# Patient Record
Sex: MALE | Race: WHITE | NOT HISPANIC OR LATINO | Employment: FULL TIME | URBAN - METROPOLITAN AREA
[De-identification: names, ages, dates, MRNs, and addresses within clinical notes are randomized per-mention and may not be internally consistent; named-entity substitution may affect disease eponyms.]

---

## 2019-01-24 NOTE — PROGRESS NOTES
Subjective:      Patient ID: Mana Cabral is a 48 y o  male  Chief Complaint   Patient presents with    new patient     establish patient       Here as a new patient  Just got new insurance and had to change doctors  Has history of acid reflux and is on nexium  Had EGD and is going to do antireflux surgery  Needs a referral in his network  When he went for bloodwork 4 months ago A1C was high and cholesterol was high  He has lost 10 pounds since then  He is eating less sugar and processed foods  He needs a recheck on labs  He does not remember how high they were  He did a "full body scan" at [facility] and was told he did have some plaque in his heart and would benefit from a statin  Up to date with eye exam   He was recently in [facility] and got a cold the last two days, has had a productive cough for past 9 days, nasal discharge, congestion  The following portions of the patient's history were reviewed and updated as appropriate: allergies, current medications, past family history, past medical history, past social history, past surgical history and problem list     Review of Systems   Constitutional: Negative  Respiratory: Negative  Cardiovascular: Negative  Current Outpatient Prescriptions   Medication Sig Dispense Refill    esomeprazole (NexIUM) 10 MG packet Take 10 mg by mouth every morning before breakfast      azithromycin (ZITHROMAX) 250 mg tablet Take 2 tablets today then 1 tablet daily x 4 days 6 tablet 0    zolpidem (AMBIEN) 10 mg tablet Take 1 tablet (10 mg total) by mouth daily at bedtime as needed for sleep 30 tablet 0     No current facility-administered medications for this visit  Objective:    /100   Pulse 92   Temp (!) 96 5 °F (35 8 °C)   Resp 16   Ht 6' 3" (1 905 m)   Wt 132 kg (292 lb)   BMI 36 50 kg/m²        Physical Exam   Constitutional: He appears well-developed and well-nourished     Eyes: Conjunctivae are normal    Neck: Neck supple  No JVD present  No thyromegaly present  Cardiovascular: Normal rate, regular rhythm, normal heart sounds and intact distal pulses  Exam reveals no gallop and no friction rub  Pulses are no weak pulses  No murmur heard  Pulses:       Dorsalis pedis pulses are 2+ on the right side, and 2+ on the left side  Pulmonary/Chest: Effort normal and breath sounds normal  He has no wheezes  He has no rales  Abdominal: Soft  Bowel sounds are normal  He exhibits no distension  There is no tenderness  Musculoskeletal: He exhibits no edema  Feet:   Right Foot:   Skin Integrity: Negative for ulcer, skin breakdown, erythema, warmth, callus or dry skin  Left Foot:   Skin Integrity: Negative for ulcer, skin breakdown, erythema, warmth, callus or dry skin  Current Outpatient Prescriptions   Medication Sig Dispense Refill    esomeprazole (NexIUM) 10 MG packet Take 10 mg by mouth every morning before breakfast      azithromycin (ZITHROMAX) 250 mg tablet Take 2 tablets today then 1 tablet daily x 4 days 6 tablet 0    zolpidem (AMBIEN) 10 mg tablet Take 1 tablet (10 mg total) by mouth daily at bedtime as needed for sleep 30 tablet 0     No current facility-administered medications for this visit  /100   Pulse 92   Temp (!) 96 5 °F (35 8 °C)   Resp 16   Ht 6' 3" (1 905 m)   Wt 132 kg (292 lb)   BMI 36 50 kg/m²     Patient's shoes and socks removed  Right Foot/Ankle   Right Foot Inspection  Skin Exam: skin normal and skin intact no dry skin, no warmth, no callus, no erythema, no maceration, no abnormal color, no pre-ulcer, no ulcer and no callus                          Toe Exam: ROM and strength within normal limits  Sensory     Proprioception: intact   Monofilament testing: intact  Vascular  Capillary refills: < 3 seconds  The right DP pulse is 2+       Left Foot/Ankle  Left Foot Inspection  Skin Exam: skin normal and skin intactno dry skin, no warmth, no erythema, no maceration, normal color, no pre-ulcer, no ulcer and no callus                         Toe Exam: ROM and strength within normal limits                   Sensory     Proprioception: intact  Monofilament: intact  Vascular  Capillary refills: < 3 seconds  The left DP pulse is 2+  Assign Risk Category:  No deformity present; No loss of protective sensation; No weak pulses       Risk: 0    Assessment/Plan:    Gastroesophageal reflux disease without esophagitis  He was referred to Dr Tasha Barr for further evaluation and management  For now will continue PPI  Type 2 diabetes mellitus without complication, without long-term current use of insulin ([facility])  No results found for: HGBA1C    No results for input(s): POCGLU in the last 72 hours  Blood Sugar Average: Last 72 hrs:    Has been diet-controlled per patient, although he does not know his A1C  He was given another bloodwork slip but would like to wait another couple of weeks until he can lose some more weight  Advised good foot and eye care  Discussed diabetic education referral but he defers at this time  Mixed hyperlipidemia  Will recheck labs and t/c statin  Elevated BP without diagnosis of hypertension  States this has been normal and will recheck after weight loss in 3-4 months  Diagnoses and all orders for this visit:    Upper respiratory tract infection, unspecified type  Comments:  Rx given  Supportive care discussed  Follow up if not improving  Orders:  -     azithromycin (ZITHROMAX) 250 mg tablet; Take 2 tablets today then 1 tablet daily x 4 days    Type 2 diabetes mellitus without complication, without long-term current use of insulin ([facility])  -     CBC and differential; Future  -     Comprehensive metabolic panel; Future  -     Lipid panel; Future  -     HEMOGLOBIN A1C W/ EAG ESTIMATION;  Future  -     CBC and differential  -     Comprehensive metabolic panel  -     Lipid panel  -     HEMOGLOBIN A1C W/ EAG ESTIMATION    Mixed hyperlipidemia  -     CBC and differential; Future  -     Comprehensive metabolic panel; Future  -     Lipid panel; Future  -     CBC and differential  -     Comprehensive metabolic panel  -     Lipid panel    Insomnia, unspecified type  -     zolpidem (AMBIEN) 10 mg tablet; Take 1 tablet (10 mg total) by mouth daily at bedtime as needed for sleep    Gastroesophageal reflux disease without esophagitis  -     Ambulatory referral to Gastroenterology; Future    Elevated BP without diagnosis of hypertension    Other orders  -     esomeprazole (NexIUM) 10 MG packet; Take 10 mg by mouth every morning before breakfast          Return in about 4 months (around 5/28/2019)         Norma Christie MD

## 2019-01-28 ENCOUNTER — OFFICE VISIT (OUTPATIENT)
Dept: FAMILY MEDICINE CLINIC | Facility: CLINIC | Age: 54
End: 2019-01-28
Payer: COMMERCIAL

## 2019-01-28 VITALS
HEART RATE: 92 BPM | SYSTOLIC BLOOD PRESSURE: 140 MMHG | HEIGHT: 75 IN | TEMPERATURE: 96.5 F | DIASTOLIC BLOOD PRESSURE: 100 MMHG | BODY MASS INDEX: 36.31 KG/M2 | RESPIRATION RATE: 16 BRPM | WEIGHT: 292 LBS

## 2019-01-28 DIAGNOSIS — K21.9 GASTROESOPHAGEAL REFLUX DISEASE WITHOUT ESOPHAGITIS: ICD-10-CM

## 2019-01-28 DIAGNOSIS — E78.2 MIXED HYPERLIPIDEMIA: ICD-10-CM

## 2019-01-28 DIAGNOSIS — E11.9 TYPE 2 DIABETES MELLITUS WITHOUT COMPLICATION, WITHOUT LONG-TERM CURRENT USE OF INSULIN (HCC): ICD-10-CM

## 2019-01-28 DIAGNOSIS — R03.0 ELEVATED BP WITHOUT DIAGNOSIS OF HYPERTENSION: ICD-10-CM

## 2019-01-28 DIAGNOSIS — J06.9 UPPER RESPIRATORY TRACT INFECTION, UNSPECIFIED TYPE: Primary | ICD-10-CM

## 2019-01-28 DIAGNOSIS — G47.00 INSOMNIA, UNSPECIFIED TYPE: ICD-10-CM

## 2019-01-28 PROCEDURE — 99203 OFFICE O/P NEW LOW 30 MIN: CPT | Performed by: INTERNAL MEDICINE

## 2019-01-28 RX ORDER — ZOLPIDEM TARTRATE 10 MG/1
10 TABLET ORAL
Qty: 30 TABLET | Refills: 0
Start: 2019-01-28 | End: 2019-06-17 | Stop reason: SDUPTHER

## 2019-01-28 RX ORDER — ESOMEPRAZOLE MAGNESIUM 10 MG/1
30 GRANULE, FOR SUSPENSION, EXTENDED RELEASE ORAL
COMMUNITY
End: 2022-01-27

## 2019-01-28 RX ORDER — AZITHROMYCIN 250 MG/1
TABLET, FILM COATED ORAL
Qty: 6 TABLET | Refills: 0 | Status: SHIPPED | OUTPATIENT
Start: 2019-01-28 | End: 2019-02-01

## 2019-01-28 NOTE — ASSESSMENT & PLAN NOTE
No results found for: HGBA1C    No results for input(s): POCGLU in the last 72 hours  Blood Sugar Average: Last 72 hrs:    Has been diet-controlled per patient, although he does not know his A1C  He was given another bloodwork slip but would like to wait another couple of weeks until he can lose some more weight  Advised good foot and eye care  Discussed diabetic education referral but he defers at this time

## 2019-02-05 ENCOUNTER — TELEPHONE (OUTPATIENT)
Dept: FAMILY MEDICINE CLINIC | Facility: CLINIC | Age: 54
End: 2019-02-05

## 2019-02-05 DIAGNOSIS — N52.9 ERECTILE DYSFUNCTION, UNSPECIFIED ERECTILE DYSFUNCTION TYPE: Primary | ICD-10-CM

## 2019-02-07 NOTE — TELEPHONE ENCOUNTER
Called patient again, no answer  Order in chart if patient needs it or calls back with a fax number   No further option at this time

## 2019-02-26 ENCOUNTER — DOCUMENTATION (OUTPATIENT)
Dept: GASTROENTEROLOGY | Facility: CLINIC | Age: 54
End: 2019-02-26

## 2019-03-01 LAB
TESTOST FREE SERPL-MCNC: 8.6 PG/ML (ref 7.2–24)
TESTOST SERPL-MCNC: 202 NG/DL (ref 264–916)

## 2019-03-02 LAB
ALBUMIN SERPL-MCNC: 4.3 G/DL (ref 3.5–5.5)
ALBUMIN/GLOB SERPL: 1.6 {RATIO} (ref 1.2–2.2)
ALP SERPL-CCNC: 86 IU/L (ref 39–117)
ALT SERPL-CCNC: 35 IU/L (ref 0–44)
AST SERPL-CCNC: 18 IU/L (ref 0–40)
BASOPHILS # BLD AUTO: 0 X10E3/UL (ref 0–0.2)
BASOPHILS NFR BLD AUTO: 0 %
BILIRUB SERPL-MCNC: 0.2 MG/DL (ref 0–1.2)
BUN SERPL-MCNC: 12 MG/DL (ref 6–24)
BUN/CREAT SERPL: 12 (ref 9–20)
CALCIUM SERPL-MCNC: 9.1 MG/DL (ref 8.7–10.2)
CHLORIDE SERPL-SCNC: 94 MMOL/L (ref 96–106)
CHOLEST SERPL-MCNC: 318 MG/DL (ref 100–199)
CHOLEST/HDLC SERPL: 15.1 RATIO (ref 0–5)
CO2 SERPL-SCNC: 23 MMOL/L (ref 20–29)
CREAT SERPL-MCNC: 0.99 MG/DL (ref 0.76–1.27)
EOSINOPHIL # BLD AUTO: 0.1 X10E3/UL (ref 0–0.4)
EOSINOPHIL NFR BLD AUTO: 2 %
ERYTHROCYTE [DISTWIDTH] IN BLOOD BY AUTOMATED COUNT: 13.2 % (ref 12.3–15.4)
EST. AVERAGE GLUCOSE BLD GHB EST-MCNC: 258 MG/DL
GLOBULIN SER-MCNC: 2.7 G/DL (ref 1.5–4.5)
GLUCOSE SERPL-MCNC: 361 MG/DL (ref 65–99)
HBA1C MFR BLD: 10.6 % (ref 4.8–5.6)
HCT VFR BLD AUTO: 44.4 % (ref 37.5–51)
HDLC SERPL-MCNC: 21 MG/DL
HGB BLD-MCNC: 15 G/DL (ref 13–17.7)
IMM GRANULOCYTES # BLD: 0 X10E3/UL (ref 0–0.1)
IMM GRANULOCYTES NFR BLD: 0 %
LDLC SERPL CALC-MCNC: ABNORMAL MG/DL (ref 0–99)
LDLC SERPL DIRECT ASSAY-MCNC: 71 MG/DL (ref 0–99)
LYMPHOCYTES # BLD AUTO: 1.2 X10E3/UL (ref 0.7–3.1)
LYMPHOCYTES NFR BLD AUTO: 23 %
MCH RBC QN AUTO: 29.5 PG (ref 26.6–33)
MCHC RBC AUTO-ENTMCNC: 33.8 G/DL (ref 31.5–35.7)
MCV RBC AUTO: 87 FL (ref 79–97)
MICRODELETION SYND BLD/T FISH: NORMAL
MONOCYTES # BLD AUTO: 0.5 X10E3/UL (ref 0.1–0.9)
MONOCYTES NFR BLD AUTO: 10 %
NEUTROPHILS # BLD AUTO: 3.3 X10E3/UL (ref 1.4–7)
NEUTROPHILS NFR BLD AUTO: 65 %
PLATELET # BLD AUTO: 171 X10E3/UL (ref 150–379)
POTASSIUM SERPL-SCNC: 4.1 MMOL/L (ref 3.5–5.2)
PROT SERPL-MCNC: 7 G/DL (ref 6–8.5)
RBC # BLD AUTO: 5.09 X10E6/UL (ref 4.14–5.8)
SL AMB EGFR AFRICAN AMERICAN: 102 ML/MIN/1.73
SL AMB EGFR NON AFRICAN AMERICAN: 88 ML/MIN/1.73
SL AMB VLDL CHOLESTEROL CALC: ABNORMAL MG/DL (ref 5–40)
SODIUM SERPL-SCNC: 135 MMOL/L (ref 134–144)
TRIGL SERPL-MCNC: 1412 MG/DL (ref 0–149)
WBC # BLD AUTO: 5.1 X10E3/UL (ref 3.4–10.8)

## 2019-03-07 ENCOUNTER — TELEPHONE (OUTPATIENT)
Dept: FAMILY MEDICINE CLINIC | Facility: CLINIC | Age: 54
End: 2019-03-07

## 2019-03-07 NOTE — TELEPHONE ENCOUNTER
Keanu Okeefe made an apt w/dr Josefina Rivero to go over blood work results but he would like a nurse to call back to tell him results (abdormal)   Letters were mailed but he has not received them yet        Please call Teodoro El at [phone]    Thank you

## 2019-03-07 NOTE — TELEPHONE ENCOUNTER
Pt advised of Glucose random, A1c, cholesterol total, triglycerides, HDL, T chol/HDL Raio, Testosterone Total and Testosterone Free results  Advised that all of these results will be discussed further at his appointment on the 13th with Dr Gisell Muniz  Advised in the meantime to make lifestyle changes in his current eating habits and with exercise  No further action required    Brenda Bryant MA

## 2019-03-11 NOTE — PROGRESS NOTES
Subjective:      Patient ID: Daylin Shepherd is a 48 y o  male  Chief Complaint   Patient presents with    Follow-up    Results       Here to discuss labwork and DM  He has been going to the gym and doing a 5/2 fasting diet  His pinkie toes get numb sometimes and his vision is getting worse  Had recent eye exam and was told retinal exam was normal   He has been doing a lot of research and states he already knows what he needs to do as far as diet, does not want diabetic education  Is exercising regularly, started recently  The following portions of the patient's history were reviewed and updated as appropriate: allergies, current medications, past family history, past medical history, past social history, past surgical history and problem list     Review of Systems   Constitutional: Negative  Respiratory: Negative  Cardiovascular: Negative  Neurological: Positive for numbness  Current Outpatient Medications   Medication Sig Dispense Refill    esomeprazole (NexIUM) 10 MG packet Take 10 mg by mouth every morning before breakfast      zolpidem (AMBIEN) 10 mg tablet Take 1 tablet (10 mg total) by mouth daily at bedtime as needed for sleep 30 tablet 0    atorvastatin (LIPITOR) 40 mg tablet Take 1 tablet (40 mg total) by mouth daily 90 tablet 3    sitaGLIPtin-metFORMIN (JANUMET)  MG per tablet Take 1 tablet by mouth 2 (two) times a day with meals 180 tablet 1     No current facility-administered medications for this visit  Objective:    /80   Pulse 84   Temp 97 6 °F (36 4 °C)   Resp 16   Ht 6' 3" (1 905 m)   Wt 132 kg (290 lb)   BMI 36 25 kg/m²        Physical Exam   Constitutional: He appears well-developed and well-nourished  Eyes: Conjunctivae are normal    Neck: Neck supple  No JVD present  No thyromegaly present  Cardiovascular: Normal rate, regular rhythm, normal heart sounds and intact distal pulses  Exam reveals no gallop and no friction rub   Pulses are no weak pulses  No murmur heard  Pulses:       Dorsalis pedis pulses are 2+ on the right side, and 2+ on the left side  Pulmonary/Chest: Effort normal and breath sounds normal  He has no wheezes  He has no rales  Abdominal: Soft  Bowel sounds are normal  He exhibits no distension  There is no tenderness  Musculoskeletal: He exhibits no edema  Feet:   Right Foot:   Skin Integrity: Negative for ulcer, skin breakdown, erythema, warmth, callus or dry skin  Left Foot:   Skin Integrity: Negative for ulcer, skin breakdown, erythema, warmth, callus or dry skin  Patient's shoes and socks removed  Right Foot/Ankle   Right Foot Inspection  Skin Exam: skin normal and skin intact no dry skin, no warmth, no callus, no erythema, no maceration, no abnormal color, no pre-ulcer, no ulcer and no callus                          Toe Exam: ROM and strength within normal limits  Sensory       Monofilament testing: intact  Vascular  Capillary refills: < 3 seconds  The right DP pulse is 2+  Left Foot/Ankle  Left Foot Inspection  Skin Exam: skin normal and skin intactno dry skin, no warmth, no erythema, no maceration, normal color, no pre-ulcer, no ulcer and no callus                         Toe Exam: ROM and strength within normal limits                   Sensory       Monofilament: intact  Vascular  Capillary refills: < 3 seconds  The left DP pulse is 2+  Assign Risk Category:  No deformity present; No loss of protective sensation; No weak pulses       Risk: 0      Assessment/Plan:    Type 2 diabetes mellitus without complication, without long-term current use of insulin ([facility])  Lab Results   Component Value Date    HGBA1C 10 6 (H) 02/28/2019       No results for input(s): POCGLU in the last 72 hours  Blood Sugar Average: Last 72 hrs: We discussed different therapies  Will start janumet twice a day, to start once daily for the first week  Discussed possible side effects with patient    Advised on good foot and eye care  Discussed need for diet/exercise and continued weight loss  Will follow up in 3 months after bloodwork  Mixed hyperlipidemia  Will start moderate dose atorvastatin due to DM and past coronary calcium score (by patient) of 81  Possible side effects were discussed  Elevated BP without diagnosis of hypertension  BP normal today  Discussed he may need ACE-I for renoprotection  Diagnoses and all orders for this visit:    Type 2 diabetes mellitus without complication, without long-term current use of insulin ([facility])  -     sitaGLIPtin-metFORMIN (JANUMET)  MG per tablet; Take 1 tablet by mouth 2 (two) times a day with meals  -     Comprehensive metabolic panel; Future  -     Lipid panel; Future  -     HEMOGLOBIN A1C W/ EAG ESTIMATION; Future  -     Comprehensive metabolic panel  -     Lipid panel  -     HEMOGLOBIN A1C W/ EAG ESTIMATION    Mixed hyperlipidemia  -     atorvastatin (LIPITOR) 40 mg tablet; Take 1 tablet (40 mg total) by mouth daily  -     Comprehensive metabolic panel; Future  -     Lipid panel; Future  -     Comprehensive metabolic panel  -     Lipid panel    Elevated BP without diagnosis of hypertension          Return in about 3 months (around 6/13/2019)         Soto Mosley MD

## 2019-03-13 ENCOUNTER — OFFICE VISIT (OUTPATIENT)
Dept: FAMILY MEDICINE CLINIC | Facility: CLINIC | Age: 54
End: 2019-03-13
Payer: COMMERCIAL

## 2019-03-13 VITALS
DIASTOLIC BLOOD PRESSURE: 80 MMHG | HEIGHT: 75 IN | TEMPERATURE: 97.6 F | BODY MASS INDEX: 36.06 KG/M2 | RESPIRATION RATE: 16 BRPM | HEART RATE: 84 BPM | WEIGHT: 290 LBS | SYSTOLIC BLOOD PRESSURE: 134 MMHG

## 2019-03-13 DIAGNOSIS — E11.9 TYPE 2 DIABETES MELLITUS WITHOUT COMPLICATION, WITHOUT LONG-TERM CURRENT USE OF INSULIN (HCC): Primary | ICD-10-CM

## 2019-03-13 DIAGNOSIS — R03.0 ELEVATED BP WITHOUT DIAGNOSIS OF HYPERTENSION: ICD-10-CM

## 2019-03-13 DIAGNOSIS — E78.2 MIXED HYPERLIPIDEMIA: ICD-10-CM

## 2019-03-13 PROCEDURE — 99214 OFFICE O/P EST MOD 30 MIN: CPT | Performed by: INTERNAL MEDICINE

## 2019-03-13 RX ORDER — ATORVASTATIN CALCIUM 40 MG/1
40 TABLET, FILM COATED ORAL DAILY
Qty: 90 TABLET | Refills: 3 | Status: SHIPPED | OUTPATIENT
Start: 2019-03-13 | End: 2019-06-04 | Stop reason: SDUPTHER

## 2019-03-13 NOTE — ASSESSMENT & PLAN NOTE
Lab Results   Component Value Date    HGBA1C 10 6 (H) 02/28/2019       No results for input(s): POCGLU in the last 72 hours  Blood Sugar Average: Last 72 hrs: We discussed different therapies  Will start janumet twice a day, to start once daily for the first week  Discussed possible side effects with patient  Advised on good foot and eye care  Discussed need for diet/exercise and continued weight loss  Will follow up in 3 months after bloodwork

## 2019-03-13 NOTE — ASSESSMENT & PLAN NOTE
Will start moderate dose atorvastatin due to DM and past coronary calcium score (by patient) of 81  Possible side effects were discussed

## 2019-03-18 ENCOUNTER — TELEPHONE (OUTPATIENT)
Dept: FAMILY MEDICINE CLINIC | Facility: CLINIC | Age: 54
End: 2019-03-18

## 2019-03-18 DIAGNOSIS — E11.9 TYPE 2 DIABETES MELLITUS WITHOUT COMPLICATION, WITHOUT LONG-TERM CURRENT USE OF INSULIN (HCC): Primary | ICD-10-CM

## 2019-03-18 DIAGNOSIS — E11.65 TYPE 2 DIABETES MELLITUS WITH HYPERGLYCEMIA, WITHOUT LONG-TERM CURRENT USE OF INSULIN (HCC): ICD-10-CM

## 2019-03-18 NOTE — TELEPHONE ENCOUNTER
Spoke with pt, states he has been having blurred vision and a headache the past couple of days which he feels may be from the blurred vision  Pt states the only new thing that he has been doing is the janumet-metformin  Pt wants to know if this is a common side effect from this medication  Pt denies any chest pain, SOB, nausea, diarrhea, vomiting  Pt would like a call back tonight to confirm and than send this message to Dr Yuliet Moses  Pt states he may also want to see an Endocrinologist regarding his diabetes because his numbers are pretty bad per pt   [place], [place]

## 2019-03-18 NOTE — TELEPHONE ENCOUNTER
Please call patient and let him know that the blurred vision is not a side effect of the medication, but can be a side effect of high sugar  I agree an endocrinologist would be helpful  Please recommend Dr Ifeanyi Waters - in [place]  I can put the order in if he would like    Bernadette Cason, DO

## 2019-03-18 NOTE — TELEPHONE ENCOUNTER
Pt would like a nurse to call him back tonight  He believes he is having some side effects from his Metformin and Janumet, blurry vision

## 2019-03-21 ENCOUNTER — APPOINTMENT (OUTPATIENT)
Dept: RADIOLOGY | Facility: CLINIC | Age: 54
End: 2019-03-21
Payer: COMMERCIAL

## 2019-03-21 ENCOUNTER — OFFICE VISIT (OUTPATIENT)
Dept: OBGYN CLINIC | Facility: CLINIC | Age: 54
End: 2019-03-21
Payer: COMMERCIAL

## 2019-03-21 VITALS
HEIGHT: 75 IN | DIASTOLIC BLOOD PRESSURE: 82 MMHG | WEIGHT: 285 LBS | HEART RATE: 85 BPM | BODY MASS INDEX: 35.43 KG/M2 | SYSTOLIC BLOOD PRESSURE: 136 MMHG

## 2019-03-21 DIAGNOSIS — M25.562 LEFT KNEE PAIN, UNSPECIFIED CHRONICITY: ICD-10-CM

## 2019-03-21 DIAGNOSIS — M17.12 PRIMARY LOCALIZED OSTEOARTHRITIS OF LEFT KNEE: Primary | ICD-10-CM

## 2019-03-21 PROCEDURE — 73562 X-RAY EXAM OF KNEE 3: CPT

## 2019-03-21 PROCEDURE — 99203 OFFICE O/P NEW LOW 30 MIN: CPT | Performed by: ORTHOPAEDIC SURGERY

## 2019-03-21 PROCEDURE — 20610 DRAIN/INJ JOINT/BURSA W/O US: CPT | Performed by: ORTHOPAEDIC SURGERY

## 2019-03-21 RX ORDER — LIDOCAINE HYDROCHLORIDE 10 MG/ML
4 INJECTION, SOLUTION INFILTRATION; PERINEURAL
Status: COMPLETED | OUTPATIENT
Start: 2019-03-21 | End: 2019-03-21

## 2019-03-21 RX ORDER — DEXAMETHASONE SODIUM PHOSPHATE 100 MG/10ML
40 INJECTION INTRAMUSCULAR; INTRAVENOUS
Status: COMPLETED | OUTPATIENT
Start: 2019-03-21 | End: 2019-03-21

## 2019-03-21 RX ADMIN — LIDOCAINE HYDROCHLORIDE 4 ML: 10 INJECTION, SOLUTION INFILTRATION; PERINEURAL at 15:59

## 2019-03-21 RX ADMIN — DEXAMETHASONE SODIUM PHOSPHATE 40 MG: 100 INJECTION INTRAMUSCULAR; INTRAVENOUS at 15:59

## 2019-03-21 NOTE — PROGRESS NOTES
Assessment/Plan:  1  Primary localized osteoarthritis of left knee  XR knee 3 vw left non injury     Leonardo Arroyo has left-sided knee pain consistent with mild osteoarthritis  We did elect to provide a left knee cortisone injection at his request today  I discussed with him the concern and risks that come along with a cortisone injection with diabetes  He understand these risks and verbally consented to a cortisone injection at this time  I counseled him extensively on warning signs of increased blood sugar and signs and symptoms that he should go to the hospital   He will try to check his blood sugar at home as well  Hopefully the cortisone injection calm down some of his discomfort and can improve his symptoms going forward  We also discussed possibility of physical therapy, bracing and viscosupplementation in the future if the pain persists  Subjective: Brandon Fowler is a 48 y o  male who presents for evaluation for left-sided knee pain  He denies any specific injury or trauma  He has had a history of recurrent discomfort over the anterior medial aspect of the left knee  He states that seems to bother him more when he is in a hyperflexed position  He states he sits on a low chair and bend his knee in this often cause increased discomfort anteriorly  He has had similar pain in the past which improved with rest   He denies any radiating pain or numbness  He denies any mechanical symptoms of locking or catching  He denies any effusion  He does have recently diagnosed diabetes  He is on oral medications  He has been current rolling his symptoms and blood sugar work with diet as well  He seems to be stable on a low sugar diet  Review of Systems   Constitutional: Negative for chills, fever and unexpected weight change  HENT: Negative for hearing loss, nosebleeds and sore throat  Eyes: Negative for pain, redness and visual disturbance     Respiratory: Negative for cough, shortness of breath and wheezing  Cardiovascular: Negative for chest pain, palpitations and leg swelling  Gastrointestinal: Negative for abdominal pain, nausea and vomiting  Endocrine: Negative for polyphagia and polyuria  Genitourinary: Negative for dysuria and hematuria  Musculoskeletal:        See HPI   Skin: Negative for rash and wound  Neurological: Negative for dizziness, numbness and headaches  Psychiatric/Behavioral: Negative for decreased concentration and suicidal ideas  The patient is not nervous/anxious  Past Medical History:   Diagnosis Date    Diabetes mellitus ([facility] [address] )        Past Surgical History:   Procedure Laterality Date    HEMICOLECTOMY      SKIN CANCER EXCISION         Family History   Problem Relation Age of Onset    Diabetes Mother     Lung cancer Mother     Hyperlipidemia Father     Hypertension Father     Hyperlipidemia Brother     Hypertension Brother        Social History     Occupational History    Not on file   Tobacco Use    Smoking status: Never Smoker    Smokeless tobacco: Never Used   Substance and Sexual Activity    Alcohol use: Not on file    Drug use: Not on file    Sexual activity: Not on file         Current Outpatient Medications:     atorvastatin (LIPITOR) 40 mg tablet, Take 1 tablet (40 mg total) by mouth daily, Disp: 90 tablet, Rfl: 3    esomeprazole (NexIUM) 10 MG packet, Take 10 mg by mouth every morning before breakfast, Disp: , Rfl:     sitaGLIPtin-metFORMIN (JANUMET)  MG per tablet, Take 1 tablet by mouth 2 (two) times a day with meals, Disp: 180 tablet, Rfl: 1    zolpidem (AMBIEN) 10 mg tablet, Take 1 tablet (10 mg total) by mouth daily at bedtime as needed for sleep, Disp: 30 tablet, Rfl: 0    No Known Allergies    Objective:  Vitals:    03/21/19 1427   BP: 136/82   Pulse: 85       Left Knee Exam     Tenderness   The patient is experiencing tenderness in the medial joint line and medial retinaculum      Range of Motion   Extension: normal Flexion: normal     Tests   Mg:  Medial - negative Lateral - negative  Varus: negative Valgus: negative  Lachman:  Anterior - negative        Other   Erythema: absent  Sensation: normal  Pulse: present  Swelling: none  Effusion: no effusion present          Observations   Left Knee   Negative for effusion  Physical Exam   Constitutional: He is oriented to person, place, and time  He appears well-developed and well-nourished  HENT:   Head: Normocephalic and atraumatic  Eyes: Pupils are equal, round, and reactive to light  Conjunctivae are normal    Neck: Normal range of motion  Neck supple  Cardiovascular: Normal rate and intact distal pulses  Pulmonary/Chest: Effort normal  No respiratory distress  Musculoskeletal:        Left knee: He exhibits no effusion  As noted in HPI   Neurological: He is alert and oriented to person, place, and time  Skin: Skin is warm and dry  Psychiatric: He has a normal mood and affect  His behavior is normal    Vitals reviewed  I have personally reviewed pertinent films in PACS and my interpretation is as follows: Three-view x-rays of the left knee demonstrate mild medial compartment narrowing consistent with mild osteoarthritis  No evidence of fracture  Lateral patellar tilt visualized on Merchant view      Large joint arthrocentesis: L knee  Date/Time: 3/21/2019 3:59 PM  Consent given by: patient  Site marked: site marked  Timeout: Immediately prior to procedure a time out was called to verify the correct patient, procedure, equipment, support staff and site/side marked as required   Supporting Documentation  Indications: pain   Procedure Details  Location: knee - L knee  Preparation: Patient was prepped and draped in the usual sterile fashion  Needle size: 22 G  Ultrasound guidance: no  Approach: anterolateral  Medications administered: 40 mg dexamethasone 100 mg/10 mL; 4 mL lidocaine 1 %    Patient tolerance: patient tolerated the procedure well with no immediate complications  Dressing:  Sterile dressing applied

## 2019-03-27 ENCOUNTER — OFFICE VISIT (OUTPATIENT)
Dept: DIABETES SERVICES | Facility: CLINIC | Age: 54
End: 2019-03-27
Payer: COMMERCIAL

## 2019-03-27 ENCOUNTER — CONSULT (OUTPATIENT)
Dept: ENDOCRINOLOGY | Facility: CLINIC | Age: 54
End: 2019-03-27
Payer: COMMERCIAL

## 2019-03-27 VITALS
DIASTOLIC BLOOD PRESSURE: 78 MMHG | HEIGHT: 75 IN | HEART RATE: 74 BPM | SYSTOLIC BLOOD PRESSURE: 120 MMHG | WEIGHT: 290 LBS | BODY MASS INDEX: 36.06 KG/M2

## 2019-03-27 DIAGNOSIS — E11.9 TYPE 2 DIABETES MELLITUS WITHOUT COMPLICATION, WITHOUT LONG-TERM CURRENT USE OF INSULIN (HCC): ICD-10-CM

## 2019-03-27 DIAGNOSIS — E78.2 MIXED HYPERLIPIDEMIA: ICD-10-CM

## 2019-03-27 DIAGNOSIS — E11.65 TYPE 2 DIABETES MELLITUS WITH HYPERGLYCEMIA, WITHOUT LONG-TERM CURRENT USE OF INSULIN (HCC): Primary | ICD-10-CM

## 2019-03-27 DIAGNOSIS — R20.0 NUMBNESS: ICD-10-CM

## 2019-03-27 PROCEDURE — 98960 EDU&TRN PT SELF-MGMT NQHP 1: CPT | Performed by: DIETITIAN, REGISTERED

## 2019-03-27 PROCEDURE — 99245 OFF/OP CONSLTJ NEW/EST HI 55: CPT | Performed by: INTERNAL MEDICINE

## 2019-03-27 NOTE — PROGRESS NOTES
Type 2 Diabetes Class Assessment    HPI: Met with Judge Rust for DSME Initial visit  Bernadette Hernandez has Type 2 Diabetes, Hypertriglyceridemia, Obesity  Patients recent A1c 10 6, Triglycerides 1,412  Patient is taking his medications as prescribed  He is only testing his BG 1 x day  He self reports readings of 108-118 fasting  He was educated on doing paired testing to find patterns of hyperglycemia to facilitate changes to diet/lifestyle/meds  He is currently on the [employer] with 2 shakes and 1 small meal per day  He stated he has been exercising 5 days per week (aerobic and strength) and has lost 9 lbs  He was educated on identifying serving size and total carbs on a food label  Diabetes Assessment  Visit Type: Initial visit  Present at Session: patient   Special Learning Needs: No  Barriers to Learning: no barriers    How do you feel about making lifestyle changes at this time? Preparation  How would you rate your current knowledge of diabetes? fair  How confident are you that will be able to take better control of your diabetes?: excellent    How long have you had diabetes? 2 years  Have you had diabetes education in the past?: No  Do you have any family members with diabetes?: Yes  Do you monitor your blood sugar? yes  Type of blood sugar monitor: Accu chek  How old is your meter?: New  How often do you test your blood sugars?:1 x day  Do you keep a written record of your blood sugars? No   Blood sugar log with patient today and reviewed by educator?: No   Blood Sugar ranges:    Fastin   Before meals:    2 hours after meals:   Any financial concerns pertaining to your diabetes supplies, medication or care?: No  Have you ever experienced hypoglycemia?:  Yes  Have you ever been hospitalized or gone to the ER due to your blood sugars?: No  How do you treat low blood sugars?: Juice  How do you treat high blood sugars?  N/A  Do you wear a Diabetes I D ?: no  Where do you dispose of your sharps (needles,lancetes)?: Regular garbage    Ht Readings from Last 1 Encounters:   03/27/19 6' 3" (1 905 m)     Wt Readings from Last 3 Encounters:   03/27/19 132 kg (290 lb)   03/21/19 129 kg (285 lb)   03/13/19 132 kg (290 lb)        There is no height or weight on file to calculate BMI  Lab Results   Component Value Date    HGBA1C 10 6 (H) 02/28/2019       No results found for: [facility]  Lab Results   Component Value Date    HDL 21 (L) 02/28/2019     No results found for: [facility]  Lab Results   Component Value Date    TRIG 1,412 (HH) 02/28/2019     Lab Results   Component Value Date    CHOLHDL 15 1 (H) 02/28/2019     No results found for: [facility]    Weight Change: Yes -9 lbs    Diet Assessment    Do you follow any special diet presently?: Yes - Isogenic  Who cooks at home?:  spouse  Who does the grocery shopping?: spouse   How frequently do you eat out?: 2-3 x week    Activity Assessment    Exercise: Weights and cardio 5 x week    Lifestyle/Social Assessment    Racial/ethnic group:                                       Primary Language: English  Marital Status:   Education Level: Some College (No Degree)  Work status: Full Time  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 Diabetes?   When was the last time you had an ophthalmology exam?: Yearly  When was the last time you had dental exam?: Every 6 months  Do you check your feet for cracks, sores, debris?: No  When was the last time you had podiatry or foot exam?: No  Last flu shot?: 2018  Pneumonia shot?: No      The patient's history was reviewed and updated as appropriate: allergies, current medications  Intervention    Diabetes Overview :   Emmy Johns was instructed on basic concepts of diabetes, including identifying role of diabetes self management, basic pathophysiology and types of diabetes, A1c and blood sugar targets  Emmy Johns has good understanding of material covered  Taking Medications: Instructed patient on action, side effects, efficacy, prescribed dosage and appropriate timing and frequency of administration of his diabetes medication  Emmy Johns has good understanding of material covered  Monitoring Blood Sugars  Instructions for Meter Teaching- Patient instructed in the following:  Site selection and skin preparation, Loading strips and lancet device, meter activation, obtaining blood sample, test strip and lancet disposal and recording log book entries  Patient has good understanding of material covered and was able to test their own blood sugar in office today  Testing frequency: Encouraged pair testing  Test sugars before a meal and 2hr after the same meal, rotating between breakfast, lunch, and dinner  Test sugars twice a day (3 days a week, 7 days a week)  Goal Blood Sugars:   Premeal , even better <110  2hr after a meal <180, even better <140  A1C <7%, even better <6 5%  Hypoglycemia: Instructed patient on definition/risk of hypoglycemia, treatment, causes/symptoms, when to notify provider of lows, prevention of hypoglycemia and exercise precautions  Comments: Ellie Hunt understanding of hypoglycemia concepts      Physical Activity: Discussed benefits of physical activity to optimize blood glucose control, encouraged activity at patient is physically able   Always consult a physician prior to starting an exercise program   Comments: Ellie Hunt understanding of exercise concepts        Diabetes Education Record  Emmy Johns received the following handouts: Know Your Blood Sugar Numbers, BG Log, Hyper/hypoglycemia, Nutrition Facts      Patient response to instruction    Comprehensionvery good  Motivationexcellent  Expected Complianceexcellent    Thank you for referring your patient to [facility], it was a pleasure working with them today  Please feel free to call with any questions or concerns      Angelique Armstrong, [address] [place] [address] [place]  [id number] [phone]

## 2019-03-27 NOTE — PATIENT INSTRUCTIONS
Continue current medications     You are being referred for diabetes education and nutrition therapy     Follow up in 3 months with repeat labs

## 2019-03-27 NOTE — PROGRESS NOTES
Esther Hanson 48 y o  male MRN: [id number]    Encounter: [id number]      Assessment/Plan     Assessment: This is a 48y o -year-old male with type 2 diabetes mellitus, hyperlipidemia    Plan:    1  Type 2 diabetes mellitus  Last A1c 10 6 %  New diagnosis of diabetes for the patient, recently started on oral hypoglycemic agents  Good glycemic control based on recall of blood sugars, however checked only fasting and pre-dinner    - continue current regimen for now  - diabetes education, medical nutrition therapy -  referral placed  - advised patient to take staggered check blood sugars throughout the day, send for review in 2-3 weeks  - repeat A1c, BMP, lipid panel in 3 months  Also check urine microalbumin    In follow-up would increase metformin to maximum dose as tolerated  - Recommended a consistent carbohydrate diet   - weight control and exercise as discussed ( ideal is 30 min, at least 5 times a week)   - home glucose monitoring and goals emphasized, requested patient bring in glucose monitor or log sheets to next visit   - discussed signs and symptoms of hypoglycemia and how to correct them appropriately  - counseled about the long term complications of uncontrolled diabetes, including, Nephropathy, Neuropathy, CVD, Retinopathy and importance  of adherence to diet, treatment plan and life style modifications   - importance of following up with Opthalmology and podiatry   - continue statin therapy  - glycohemoglobin and other lab monitoring discussed, A1c goal value reviewed  - long term diabetic complications discussed    2  Hyperlipidemia, triglyceridemia  Glycemic control as above  Continue statin  Repeat lipid panel in 3 months    3  Numbness  - check vitamin B12, vitamin-D, TSH      CC: Diabetes    History of Present Illness     HPI:  Esther Hanson is a 48 y o  male presents for a new visit regarding diabetes management        DM history:   Diagnosed in 2017,  Patient states that his A1c was around 7 6%, was not started on any medications but made lifestyle changes in lost approximately 9 lb  After that his repeat A1c was within normal limits  Most recent labs showed an A1c of 10 6 %; Now on medications only for the last 3 weeks  At the same time was also found to have hyperlipidemia    complications  CAD/ CVA/CKD  Family history of DM: Mother type 1   Last Eye exam: Jan 2019 - no retinopathy per patient    Current regimen:   Janumet 50/500 1 tablet orally twice a day    Statin:  Lipitor 40  ACE-I/ARB: none     Since the patient has recent diagnosis he said he has made major lifestyle changes  He is trying to eat better- high protein ; Avoids carbohydrates ; no refines sugars   Trying to exercise ; Almost everyday 1 5 hrs , cardio and aerobics  Hoping to lose weight  Denies any side effects with the medication  Appetite is good  Denies recent weight gain/ loss  Occasional numbness in the pinky does  Has had blurriness of vision  Prescription glasses from 3 months ago are not working as well  No chest pain/ SOB  No diarrhea/ constipation  No urinary complaints  Recall   Before breakfast: 108, 118, 108  Before lunch:  --   Before dinner:  118,   Bedtime:  --     All other systems were reviewed and are negative        Review of Systems      Historical Information   Past Medical History:   Diagnosis Date    Diabetes mellitus ([facility] [address] )      Past Surgical History:   Procedure Laterality Date    HEMICOLECTOMY      SKIN CANCER EXCISION       Social History   Social History     Substance and Sexual Activity   Alcohol Use Not Currently     Social History     Substance and Sexual Activity   Drug Use Not on file     Social History     Tobacco Use   Smoking Status Never Smoker   Smokeless Tobacco Never Used     Family History:   Family History   Problem Relation Age of Onset    Diabetes Mother     Lung cancer Mother     Hyperlipidemia Father     Hypertension Father     Hyperlipidemia Brother     Hypertension Brother        Meds/Allergies   Current Outpatient Medications   Medication Sig Dispense Refill    atorvastatin (LIPITOR) 40 mg tablet Take 1 tablet (40 mg total) by mouth daily 90 tablet 3    esomeprazole (NexIUM) 10 MG packet Take 10 mg by mouth every morning before breakfast      sitaGLIPtin-metFORMIN (JANUMET)  MG per tablet Take 1 tablet by mouth 2 (two) times a day with meals 180 tablet 1    zolpidem (AMBIEN) 10 mg tablet Take 1 tablet (10 mg total) by mouth daily at bedtime as needed for sleep 30 tablet 0     No current facility-administered medications for this visit  No Known Allergies    Objective   Vitals: Blood pressure 120/78, pulse 74, height 6' 3" (1 905 m), weight 132 kg (290 lb)  Physical Exam   Constitutional: He is oriented to person, place, and time  He appears well-developed and well-nourished  No distress  HENT:   Head: Normocephalic and atraumatic  Eyes: Pupils are equal, round, and reactive to light  Conjunctivae are normal    Neck: Normal range of motion  Neck supple  No thyromegaly present  Cardiovascular: Normal rate, regular rhythm and normal heart sounds  No murmur heard  Pulmonary/Chest: Effort normal and breath sounds normal  No respiratory distress  He has no wheezes  Abdominal: Soft  He exhibits no distension  There is no tenderness  There is no guarding  Obese  Well-healed vertical surgical scar  Hernia right side of the abdomen   Musculoskeletal: He exhibits no edema  Lymphadenopathy:     He has no cervical adenopathy  Neurological: He is alert and oriented to person, place, and time  Skin: Skin is warm and dry  No rash noted  He is not diaphoretic  No erythema  Psychiatric: He has a normal mood and affect  His behavior is normal  Thought content normal    Vitals reviewed  The history was obtained from the review of the chart, patient      Lab Results:   Lab Results   Component Value Date/Time    Hemoglobin A1C 10 6 (H) 02/28/2019 07:34 AM    White Blood Cell Count 5 1 02/28/2019 07:34 AM    Hemoglobin 15 0 02/28/2019 07:34 AM    HCT 44 4 02/28/2019 07:34 AM    MCV 87 02/28/2019 07:34 AM    Platelet Count 515 68/34/2929 07:34 AM    BUN 12 02/28/2019 07:34 AM    Potassium 4 1 02/28/2019 07:34 AM    Chloride 94 (L) 02/28/2019 07:34 AM    CO2 23 02/28/2019 07:34 AM    AST 18 02/28/2019 07:34 AM    ALT 35 02/28/2019 07:34 AM    Albumin 4 3 02/28/2019 07:34 AM    Globulin, Total 2 7 02/28/2019 07:34 AM    HDL 21 (L) 02/28/2019 07:34 AM    Triglycerides 1,412 ([place]) 02/28/2019 07:34 AM           Imaging Studies: I have personally reviewed pertinent reports  Portions of the record may have been created with voice recognition software  Occasional wrong word or "sound a like" substitutions may have occurred due to the inherent limitations of voice recognition software  Read the chart carefully and recognize, using context, where substitutions have occurred

## 2019-03-27 NOTE — PATIENT INSTRUCTIONS
1  Start testing blood sugar before and two hours after different meals as shown  If blood sugar is above target, write what food you have eaten in the comment section of the blood sugar log   2  Check food labels for serving size and total carbohydrates per serving  If blood sugar is high 2 hours after eating, try eating less of the carb food or switch to a different food to see if that works better

## 2019-04-01 ENCOUNTER — OFFICE VISIT (OUTPATIENT)
Dept: FAMILY MEDICINE CLINIC | Facility: CLINIC | Age: 54
End: 2019-04-01
Payer: COMMERCIAL

## 2019-04-01 VITALS
TEMPERATURE: 97.4 F | RESPIRATION RATE: 16 BRPM | WEIGHT: 290.2 LBS | SYSTOLIC BLOOD PRESSURE: 132 MMHG | DIASTOLIC BLOOD PRESSURE: 84 MMHG | HEIGHT: 75 IN | BODY MASS INDEX: 36.08 KG/M2 | HEART RATE: 74 BPM

## 2019-04-01 DIAGNOSIS — J35.8 CRYPTIC TONSIL: ICD-10-CM

## 2019-04-01 DIAGNOSIS — K21.9 GASTROESOPHAGEAL REFLUX DISEASE WITHOUT ESOPHAGITIS: ICD-10-CM

## 2019-04-01 DIAGNOSIS — J35.8 TONSIL STONE: Primary | ICD-10-CM

## 2019-04-01 DIAGNOSIS — E11.65 TYPE 2 DIABETES MELLITUS WITH HYPERGLYCEMIA, WITHOUT LONG-TERM CURRENT USE OF INSULIN (HCC): ICD-10-CM

## 2019-04-01 PROBLEM — R03.0 ELEVATED BP WITHOUT DIAGNOSIS OF HYPERTENSION: Status: RESOLVED | Noted: 2019-01-28 | Resolved: 2019-04-01

## 2019-04-01 PROCEDURE — 3008F BODY MASS INDEX DOCD: CPT | Performed by: FAMILY MEDICINE

## 2019-04-01 PROCEDURE — 99214 OFFICE O/P EST MOD 30 MIN: CPT | Performed by: FAMILY MEDICINE

## 2019-04-03 ENCOUNTER — TELEPHONE (OUTPATIENT)
Dept: OBGYN CLINIC | Facility: HOSPITAL | Age: 54
End: 2019-04-03

## 2019-04-03 DIAGNOSIS — M17.12 PRIMARY LOCALIZED OSTEOARTHRITIS OF LEFT KNEE: Primary | ICD-10-CM

## 2019-04-04 ENCOUNTER — TELEPHONE (OUTPATIENT)
Dept: ENDOCRINOLOGY | Facility: CLINIC | Age: 54
End: 2019-04-04

## 2019-04-05 ENCOUNTER — TELEPHONE (OUTPATIENT)
Dept: OBGYN CLINIC | Facility: CLINIC | Age: 54
End: 2019-04-05

## 2019-04-05 DIAGNOSIS — M17.12 PRIMARY LOCALIZED OSTEOARTHRITIS OF LEFT KNEE: Primary | ICD-10-CM

## 2019-04-05 RX ORDER — NAPROXEN 500 MG/1
500 TABLET ORAL 2 TIMES DAILY WITH MEALS
Qty: 60 TABLET | Refills: 0 | Status: ON HOLD | OUTPATIENT
Start: 2019-04-05 | End: 2019-10-17

## 2019-04-11 ENCOUNTER — OFFICE VISIT (OUTPATIENT)
Dept: DIABETES SERVICES | Facility: CLINIC | Age: 54
End: 2019-04-11
Payer: COMMERCIAL

## 2019-04-11 VITALS — WEIGHT: 287.1 LBS | BODY MASS INDEX: 35.89 KG/M2

## 2019-04-11 DIAGNOSIS — E11.9 TYPE 2 DIABETES MELLITUS WITHOUT COMPLICATION, WITHOUT LONG-TERM CURRENT USE OF INSULIN (HCC): Primary | ICD-10-CM

## 2019-04-11 PROCEDURE — 97802 MEDICAL NUTRITION INDIV IN: CPT | Performed by: DIETITIAN, REGISTERED

## 2019-04-24 ENCOUNTER — TELEPHONE (OUTPATIENT)
Dept: ENDOCRINOLOGY | Facility: CLINIC | Age: 54
End: 2019-04-24

## 2019-04-25 ENCOUNTER — OFFICE VISIT (OUTPATIENT)
Dept: OBGYN CLINIC | Facility: CLINIC | Age: 54
End: 2019-04-25
Payer: COMMERCIAL

## 2019-04-25 VITALS
SYSTOLIC BLOOD PRESSURE: 132 MMHG | WEIGHT: 277 LBS | HEART RATE: 82 BPM | DIASTOLIC BLOOD PRESSURE: 89 MMHG | BODY MASS INDEX: 34.44 KG/M2 | HEIGHT: 75 IN

## 2019-04-25 DIAGNOSIS — M17.12 PRIMARY LOCALIZED OSTEOARTHRITIS OF LEFT KNEE: Primary | ICD-10-CM

## 2019-04-25 PROCEDURE — 99213 OFFICE O/P EST LOW 20 MIN: CPT | Performed by: ORTHOPAEDIC SURGERY

## 2019-04-25 PROCEDURE — 20610 DRAIN/INJ JOINT/BURSA W/O US: CPT | Performed by: ORTHOPAEDIC SURGERY

## 2019-04-25 RX ORDER — HYALURONATE SODIUM 10 MG/ML
20 SYRINGE (ML) INTRAARTICULAR
Status: COMPLETED | OUTPATIENT
Start: 2019-04-25 | End: 2019-04-25

## 2019-04-25 RX ADMIN — Medication 20 MG: at 13:47

## 2019-04-29 ENCOUNTER — OFFICE VISIT (OUTPATIENT)
Dept: ENDOCRINOLOGY | Facility: CLINIC | Age: 54
End: 2019-04-29
Payer: COMMERCIAL

## 2019-04-29 VITALS
HEART RATE: 85 BPM | SYSTOLIC BLOOD PRESSURE: 120 MMHG | HEIGHT: 75 IN | WEIGHT: 277 LBS | DIASTOLIC BLOOD PRESSURE: 60 MMHG | BODY MASS INDEX: 34.44 KG/M2

## 2019-04-29 DIAGNOSIS — R79.89 LOW TESTOSTERONE: Primary | ICD-10-CM

## 2019-04-29 DIAGNOSIS — E11.65 TYPE 2 DIABETES MELLITUS WITH HYPERGLYCEMIA, WITHOUT LONG-TERM CURRENT USE OF INSULIN (HCC): ICD-10-CM

## 2019-04-29 DIAGNOSIS — E78.2 MIXED HYPERLIPIDEMIA: ICD-10-CM

## 2019-04-29 PROCEDURE — 99215 OFFICE O/P EST HI 40 MIN: CPT | Performed by: INTERNAL MEDICINE

## 2019-05-02 ENCOUNTER — OFFICE VISIT (OUTPATIENT)
Dept: OBGYN CLINIC | Facility: CLINIC | Age: 54
End: 2019-05-02
Payer: COMMERCIAL

## 2019-05-02 VITALS
HEART RATE: 76 BPM | DIASTOLIC BLOOD PRESSURE: 75 MMHG | SYSTOLIC BLOOD PRESSURE: 128 MMHG | WEIGHT: 279.4 LBS | HEIGHT: 75 IN | BODY MASS INDEX: 34.74 KG/M2

## 2019-05-02 DIAGNOSIS — M17.12 PRIMARY LOCALIZED OSTEOARTHRITIS OF LEFT KNEE: Primary | ICD-10-CM

## 2019-05-02 PROCEDURE — 20610 DRAIN/INJ JOINT/BURSA W/O US: CPT | Performed by: ORTHOPAEDIC SURGERY

## 2019-05-02 RX ORDER — HYALURONATE SODIUM 10 MG/ML
20 SYRINGE (ML) INTRAARTICULAR
Status: COMPLETED | OUTPATIENT
Start: 2019-05-02 | End: 2019-05-02

## 2019-05-02 RX ADMIN — Medication 20 MG: at 08:56

## 2019-05-09 ENCOUNTER — OFFICE VISIT (OUTPATIENT)
Dept: OBGYN CLINIC | Facility: CLINIC | Age: 54
End: 2019-05-09
Payer: COMMERCIAL

## 2019-05-09 VITALS
WEIGHT: 274 LBS | DIASTOLIC BLOOD PRESSURE: 72 MMHG | HEIGHT: 75 IN | BODY MASS INDEX: 34.07 KG/M2 | HEART RATE: 76 BPM | SYSTOLIC BLOOD PRESSURE: 121 MMHG

## 2019-05-09 DIAGNOSIS — M17.12 PRIMARY LOCALIZED OSTEOARTHRITIS OF LEFT KNEE: Primary | ICD-10-CM

## 2019-05-09 PROCEDURE — 20610 DRAIN/INJ JOINT/BURSA W/O US: CPT | Performed by: ORTHOPAEDIC SURGERY

## 2019-05-09 RX ORDER — HYALURONATE SODIUM 10 MG/ML
20 SYRINGE (ML) INTRAARTICULAR
Status: COMPLETED | OUTPATIENT
Start: 2019-05-09 | End: 2019-05-09

## 2019-05-09 RX ADMIN — Medication 20 MG: at 16:56

## 2019-05-17 ENCOUNTER — OFFICE VISIT (OUTPATIENT)
Dept: OBGYN CLINIC | Facility: CLINIC | Age: 54
End: 2019-05-17
Payer: COMMERCIAL

## 2019-05-17 VITALS
HEIGHT: 75 IN | HEART RATE: 78 BPM | WEIGHT: 279.8 LBS | DIASTOLIC BLOOD PRESSURE: 80 MMHG | BODY MASS INDEX: 34.79 KG/M2 | SYSTOLIC BLOOD PRESSURE: 152 MMHG

## 2019-05-17 DIAGNOSIS — M75.51 SUBACROMIAL BURSITIS OF RIGHT SHOULDER JOINT: Primary | ICD-10-CM

## 2019-05-17 DIAGNOSIS — M17.12 PRIMARY OSTEOARTHRITIS OF LEFT KNEE: ICD-10-CM

## 2019-05-17 DIAGNOSIS — E11.65 TYPE 2 DIABETES MELLITUS WITH HYPERGLYCEMIA, WITHOUT LONG-TERM CURRENT USE OF INSULIN (HCC): ICD-10-CM

## 2019-05-17 DIAGNOSIS — M25.511 RIGHT SHOULDER PAIN, UNSPECIFIED CHRONICITY: ICD-10-CM

## 2019-05-17 PROCEDURE — 99214 OFFICE O/P EST MOD 30 MIN: CPT | Performed by: ORTHOPAEDIC SURGERY

## 2019-05-17 PROCEDURE — 20610 DRAIN/INJ JOINT/BURSA W/O US: CPT | Performed by: ORTHOPAEDIC SURGERY

## 2019-05-17 RX ORDER — DEXAMETHASONE SODIUM PHOSPHATE 100 MG/10ML
40 INJECTION INTRAMUSCULAR; INTRAVENOUS
Status: COMPLETED | OUTPATIENT
Start: 2019-05-17 | End: 2019-05-17

## 2019-05-17 RX ORDER — LIDOCAINE HYDROCHLORIDE 10 MG/ML
4 INJECTION, SOLUTION INFILTRATION; PERINEURAL
Status: COMPLETED | OUTPATIENT
Start: 2019-05-17 | End: 2019-05-17

## 2019-05-17 RX ADMIN — LIDOCAINE HYDROCHLORIDE 4 ML: 10 INJECTION, SOLUTION INFILTRATION; PERINEURAL at 14:45

## 2019-05-17 RX ADMIN — DEXAMETHASONE SODIUM PHOSPHATE 40 MG: 100 INJECTION INTRAMUSCULAR; INTRAVENOUS at 14:45

## 2019-06-04 DIAGNOSIS — E78.2 MIXED HYPERLIPIDEMIA: ICD-10-CM

## 2019-06-04 RX ORDER — ATORVASTATIN CALCIUM 40 MG/1
TABLET, FILM COATED ORAL
Qty: 90 TABLET | Refills: 3 | Status: SHIPPED | OUTPATIENT
Start: 2019-06-04 | End: 2020-07-02

## 2019-06-13 ENCOUNTER — TELEPHONE (OUTPATIENT)
Dept: FAMILY MEDICINE CLINIC | Facility: CLINIC | Age: 54
End: 2019-06-13

## 2019-06-15 LAB
ALBUMIN SERPL-MCNC: 4.3 G/DL (ref 3.5–5.5)
ALBUMIN/GLOB SERPL: 1.6 {RATIO} (ref 1.2–2.2)
ALP SERPL-CCNC: 54 IU/L (ref 39–117)
ALT SERPL-CCNC: 18 IU/L (ref 0–44)
AST SERPL-CCNC: 16 IU/L (ref 0–40)
BILIRUB SERPL-MCNC: 0.3 MG/DL (ref 0–1.2)
BUN SERPL-MCNC: 16 MG/DL (ref 6–24)
BUN/CREAT SERPL: 14 (ref 9–20)
CALCIUM SERPL-MCNC: 9.5 MG/DL (ref 8.7–10.2)
CHLORIDE SERPL-SCNC: 101 MMOL/L (ref 96–106)
CHOLEST SERPL-MCNC: 111 MG/DL (ref 100–199)
CHOLEST/HDLC SERPL: 3.4 RATIO (ref 0–5)
CO2 SERPL-SCNC: 25 MMOL/L (ref 20–29)
CREAT SERPL-MCNC: 1.15 MG/DL (ref 0.76–1.27)
EST. AVERAGE GLUCOSE BLD GHB EST-MCNC: 128 MG/DL
GLOBULIN SER-MCNC: 2.7 G/DL (ref 1.5–4.5)
GLUCOSE SERPL-MCNC: 97 MG/DL (ref 65–99)
HBA1C MFR BLD: 6.1 % (ref 4.8–5.6)
HDLC SERPL-MCNC: 33 MG/DL
LDLC SERPL CALC-MCNC: 64 MG/DL (ref 0–99)
MICRODELETION SYND BLD/T FISH: NORMAL
POTASSIUM SERPL-SCNC: 4.9 MMOL/L (ref 3.5–5.2)
PROT SERPL-MCNC: 7 G/DL (ref 6–8.5)
SL AMB EGFR AFRICAN AMERICAN: 85 ML/MIN/1.73
SL AMB EGFR NON AFRICAN AMERICAN: 73 ML/MIN/1.73
SL AMB VLDL CHOLESTEROL CALC: 14 MG/DL (ref 5–40)
SODIUM SERPL-SCNC: 140 MMOL/L (ref 134–144)
TRIGL SERPL-MCNC: 71 MG/DL (ref 0–149)

## 2019-06-17 ENCOUNTER — OFFICE VISIT (OUTPATIENT)
Dept: FAMILY MEDICINE CLINIC | Facility: CLINIC | Age: 54
End: 2019-06-17
Payer: COMMERCIAL

## 2019-06-17 VITALS
DIASTOLIC BLOOD PRESSURE: 90 MMHG | WEIGHT: 271 LBS | HEART RATE: 88 BPM | SYSTOLIC BLOOD PRESSURE: 120 MMHG | HEIGHT: 75 IN | TEMPERATURE: 97.5 F | RESPIRATION RATE: 16 BRPM | BODY MASS INDEX: 33.69 KG/M2

## 2019-06-17 DIAGNOSIS — E78.2 MIXED HYPERLIPIDEMIA: ICD-10-CM

## 2019-06-17 DIAGNOSIS — G47.00 INSOMNIA, UNSPECIFIED TYPE: ICD-10-CM

## 2019-06-17 DIAGNOSIS — Z11.59 ENCOUNTER FOR HEPATITIS C SCREENING TEST FOR LOW RISK PATIENT: ICD-10-CM

## 2019-06-17 DIAGNOSIS — E11.65 TYPE 2 DIABETES MELLITUS WITH HYPERGLYCEMIA, WITHOUT LONG-TERM CURRENT USE OF INSULIN (HCC): Primary | ICD-10-CM

## 2019-06-17 PROCEDURE — 99214 OFFICE O/P EST MOD 30 MIN: CPT | Performed by: INTERNAL MEDICINE

## 2019-06-17 RX ORDER — ZOLPIDEM TARTRATE 10 MG/1
10 TABLET ORAL
Qty: 30 TABLET | Refills: 0 | Status: SHIPPED | OUTPATIENT
Start: 2019-06-17 | End: 2019-08-13 | Stop reason: SDUPTHER

## 2019-06-24 ENCOUNTER — TELEPHONE (OUTPATIENT)
Dept: OBGYN CLINIC | Facility: HOSPITAL | Age: 54
End: 2019-06-24

## 2019-06-24 DIAGNOSIS — M25.562 ACUTE PAIN OF LEFT KNEE: Primary | ICD-10-CM

## 2019-06-27 ENCOUNTER — TELEPHONE (OUTPATIENT)
Dept: OBGYN CLINIC | Facility: CLINIC | Age: 54
End: 2019-06-27

## 2019-06-28 ENCOUNTER — TELEPHONE (OUTPATIENT)
Dept: OBGYN CLINIC | Facility: CLINIC | Age: 54
End: 2019-06-28

## 2019-07-01 ENCOUNTER — HOSPITAL ENCOUNTER (OUTPATIENT)
Dept: RADIOLOGY | Facility: HOSPITAL | Age: 54
Discharge: HOME | End: 2019-07-01
Attending: ORTHOPAEDIC SURGERY
Payer: COMMERCIAL

## 2019-07-01 DIAGNOSIS — M25.562 ACUTE PAIN OF LEFT KNEE: ICD-10-CM

## 2019-07-01 PROCEDURE — 73721 MRI JNT OF LWR EXTRE W/O DYE: CPT

## 2019-07-02 ENCOUNTER — TELEPHONE (OUTPATIENT)
Dept: OBGYN CLINIC | Facility: HOSPITAL | Age: 54
End: 2019-07-02

## 2019-07-02 NOTE — TELEPHONE ENCOUNTER
Patient is calling to inform us that the MRI of his left knee was completed on 07/01/19  The patient would prefer to get the results over the phone  The patient would prefer to avoid an OV copay  Please advise       [phone]

## 2019-07-03 ENCOUNTER — TELEPHONE (OUTPATIENT)
Dept: OBGYN CLINIC | Facility: CLINIC | Age: 54
End: 2019-07-03

## 2019-07-03 NOTE — TELEPHONE ENCOUNTER
----- Message from Kathleen Araiza DO sent at 7/2/2019  5:17 PM EDT -----  Jeanie Graham is MRI did show a medial meniscus tear in his knee  It also showed mild osteoarthritis in the knee  Since he did not improve with conservative treatment of injections I would like for him to see Dr Karen Barteltt to discuss surgical options

## 2019-07-19 ENCOUNTER — OFFICE VISIT (OUTPATIENT)
Dept: OBGYN CLINIC | Facility: CLINIC | Age: 54
End: 2019-07-19
Payer: COMMERCIAL

## 2019-07-19 VITALS
HEIGHT: 76 IN | SYSTOLIC BLOOD PRESSURE: 145 MMHG | DIASTOLIC BLOOD PRESSURE: 90 MMHG | BODY MASS INDEX: 31.59 KG/M2 | HEART RATE: 97 BPM | WEIGHT: 259.4 LBS

## 2019-07-19 DIAGNOSIS — S83.232A COMPLEX TEAR OF MEDIAL MENISCUS OF LEFT KNEE AS CURRENT INJURY, INITIAL ENCOUNTER: Primary | ICD-10-CM

## 2019-07-19 PROCEDURE — 99214 OFFICE O/P EST MOD 30 MIN: CPT | Performed by: ORTHOPAEDIC SURGERY

## 2019-07-19 NOTE — PROGRESS NOTES
Assessment/Plan:  1  Complex tear of medial meniscus of left knee as current injury, initial encounter         Scribe Attestation    I,:   Luis Lopez MA am acting as a scribe while in the presence of the attending physician :        I,:   Kasia Shi MD personally performed the services described in this documentation    as scribed in my presence :            MRI was reviewed patient today in office  Based off his imaging I do believe patient would benefit from surgical intervention  Patient agrees with the plan of care and would like to proceed with surgery  Patient will undergo a left knee arthroscopy with medial meniscectomy  Discussed the risks of surgery in office  Risk of the surgery are inclusive of but not limited to bleeding, infection, nerve injury, blood clot, worsening of symptoms, not achieving the anticipated results, persistent stiffness, weakness and the need for additional surgery  The patient verbally stated they understood those risks and would like to proceed with the surgery  Surgical consents were signed office today  We will follow up with him postoperatively in the office  Subjective: Vince Quiroz is a 48 y o  male who presents to the office today for evaluation of his left knee  He has previously seen Dr Jasen Barfield regard to this pain  Patient states he started to experience pain in late March  He notes the pain started when he was getting out of his chair at work when his leg twisted underneath him  Patient states he was given corticosteroid injections by Dr Jasen Barfield which did not provide him with any relief  He has tried viscosupplementation as well which he states did not provide him with any relief  Patient reports his pain is intermittent but he does frequently have nighttime pain to the medial aspect of his knee  Patient denies any new injury today  He denies any numbness and tingling  He denies any history of knee injury        Review of Systems Constitutional: Negative for chills, fever and unexpected weight change  HENT: Negative for hearing loss, nosebleeds and sore throat  Eyes: Negative for pain, redness and visual disturbance  Respiratory: Negative for cough, shortness of breath and wheezing  Cardiovascular: Negative for chest pain, palpitations and leg swelling  Gastrointestinal: Negative for abdominal pain, nausea and vomiting  Endocrine: Negative for polydipsia and polyuria  Genitourinary: Negative for dysuria and hematuria  Musculoskeletal: Positive for arthralgias  Negative for joint swelling and myalgias  Skin: Negative for rash and wound  Neurological: Negative for dizziness, numbness and headaches  Psychiatric/Behavioral: Negative for agitation, decreased concentration and suicidal ideas           Past Medical History:   Diagnosis Date    [facility]        Past Surgical History:   Procedure Laterality Date    HEMICOLECTOMY      SKIN CANCER EXCISION         Family History   Problem Relation Age of Onset    Diabetes Mother     Lung cancer Mother     Hyperlipidemia Father     Hypertension Father     Hyperlipidemia Brother     Hypertension Brother        Social History     Occupational History    Not on file   Tobacco Use    Smoking status: Never Smoker    Smokeless tobacco: Never Used   Substance and Sexual Activity    Alcohol use: Not Currently    Drug use: Not on file    Sexual activity: Not on file         Current Outpatient Medications:     atorvastatin (LIPITOR) 40 mg tablet, TAKE 1 TABLET BY MOUTH EVERY DAY, Disp: 90 tablet, Rfl: 3    esomeprazole (NexIUM) 10 MG packet, Take 30 mg by mouth daily at bedtime , Disp: , Rfl:     naproxen (NAPROSYN) 500 mg tablet, Take 1 tablet (500 mg total) by mouth 2 (two) times a day with meals, Disp: 60 tablet, Rfl: 0    sitaGLIPtin-metFORMIN (JANUMET)  MG per tablet, Take 1 tablet by mouth 2 (two) times a day with meals, Disp: 180 tablet, Rfl: 1    zolpidem (AMBIEN) 10 mg tablet, Take 1 tablet (10 mg total) by mouth daily at bedtime as needed for sleep, Disp: 30 tablet, Rfl: 0    No Known Allergies    Objective:  Vitals:    07/19/19 1536   BP: 145/90   Pulse: 97       Left Knee Exam     Tenderness   The patient is experiencing tenderness in the medial joint line  Range of Motion   Extension: 0   Flexion: 140     Tests   Varus: negative Valgus: negative  Lachman:  Anterior - negative        Other   Erythema: absent  Scars: absent  Sensation: normal  Pulse: present  Swelling: none  Effusion: no effusion present          Observations   Left Knee   Negative for effusion  Physical Exam   Constitutional: He is oriented to person, place, and time  He appears well-developed and well-nourished  HENT:   Head: Normocephalic and atraumatic  Eyes: Conjunctivae and EOM are normal    Neck: Normal range of motion  Neck supple  No JVD present  Cardiovascular: Normal rate, normal heart sounds and intact distal pulses  Pulmonary/Chest: Effort normal and breath sounds normal  No respiratory distress  Musculoskeletal: He exhibits tenderness  Left knee: He exhibits no effusion  Neurological: He is alert and oriented to person, place, and time  Skin: Skin is warm and dry  Psychiatric: He has a normal mood and affect  His behavior is normal    Vitals reviewed  I have personally reviewed pertinent films in PACS and my interpretation is as follows:  MRI of left knee obtained on 07/01/2019 demonstrates large complex tear of the medial meniscus posterior horn

## 2019-08-13 DIAGNOSIS — G47.00 INSOMNIA, UNSPECIFIED TYPE: ICD-10-CM

## 2019-08-13 RX ORDER — ZOLPIDEM TARTRATE 10 MG/1
10 TABLET ORAL
Qty: 30 TABLET | Refills: 0 | Status: SHIPPED | OUTPATIENT
Start: 2019-08-13 | End: 2020-01-14 | Stop reason: SDUPTHER

## 2019-08-14 ENCOUNTER — TELEPHONE (OUTPATIENT)
Dept: FAMILY MEDICINE CLINIC | Facility: CLINIC | Age: 54
End: 2019-08-14

## 2019-08-14 DIAGNOSIS — E11.65 TYPE 2 DIABETES MELLITUS WITH HYPERGLYCEMIA, WITHOUT LONG-TERM CURRENT USE OF INSULIN (HCC): Primary | ICD-10-CM

## 2019-08-14 DIAGNOSIS — E78.2 MIXED HYPERLIPIDEMIA: ICD-10-CM

## 2019-08-14 NOTE — TELEPHONE ENCOUNTER
He already has a bloodwork order in the chart from his appointment in June  He should not get this done prior to September 14 as it will be too soon and insurance won't pay

## 2019-08-14 NOTE — TELEPHONE ENCOUNTER
Holly Garcia is calling to make an apt with Dr Lala Goltz which I made for beginning of Sept   He is worried about his A1C and would like Dr Lala Goltz to order blood work for this  He said he is loosing weight and stressed out  He was wondering if this could affect his A1C  Please call patient when order is in chart  I told him to f/u with his Endo but he doesn't want to deal with them  Do we monitor his A1C?

## 2019-09-05 ENCOUNTER — TELEPHONE (OUTPATIENT)
Dept: FAMILY MEDICINE CLINIC | Facility: CLINIC | Age: 54
End: 2019-09-05

## 2019-09-19 ENCOUNTER — OFFICE VISIT (OUTPATIENT)
Dept: OBGYN CLINIC | Facility: CLINIC | Age: 54
End: 2019-09-19
Payer: COMMERCIAL

## 2019-09-19 VITALS
WEIGHT: 251.6 LBS | SYSTOLIC BLOOD PRESSURE: 144 MMHG | BODY MASS INDEX: 31.28 KG/M2 | HEART RATE: 96 BPM | HEIGHT: 75 IN | DIASTOLIC BLOOD PRESSURE: 91 MMHG

## 2019-09-19 DIAGNOSIS — M77.12 LATERAL EPICONDYLITIS OF LEFT ELBOW: Primary | ICD-10-CM

## 2019-09-19 DIAGNOSIS — M25.522 PAIN IN LEFT ELBOW: ICD-10-CM

## 2019-09-19 DIAGNOSIS — E11.65 TYPE 2 DIABETES MELLITUS WITH HYPERGLYCEMIA, WITHOUT LONG-TERM CURRENT USE OF INSULIN (HCC): ICD-10-CM

## 2019-09-19 PROCEDURE — 20605 DRAIN/INJ JOINT/BURSA W/O US: CPT | Performed by: ORTHOPAEDIC SURGERY

## 2019-09-19 PROCEDURE — 99214 OFFICE O/P EST MOD 30 MIN: CPT | Performed by: ORTHOPAEDIC SURGERY

## 2019-09-19 RX ORDER — TRIAMCINOLONE ACETONIDE 40 MG/ML
40 INJECTION, SUSPENSION INTRA-ARTICULAR; INTRAMUSCULAR
Status: COMPLETED | OUTPATIENT
Start: 2019-09-19 | End: 2019-09-19

## 2019-09-19 RX ORDER — LIDOCAINE HYDROCHLORIDE 10 MG/ML
1 INJECTION, SOLUTION INFILTRATION; PERINEURAL
Status: COMPLETED | OUTPATIENT
Start: 2019-09-19 | End: 2019-09-19

## 2019-09-19 RX ADMIN — TRIAMCINOLONE ACETONIDE 40 MG: 40 INJECTION, SUSPENSION INTRA-ARTICULAR; INTRAMUSCULAR at 10:25

## 2019-09-19 RX ADMIN — LIDOCAINE HYDROCHLORIDE 1 ML: 10 INJECTION, SOLUTION INFILTRATION; PERINEURAL at 10:25

## 2019-09-19 NOTE — PROGRESS NOTES
Assessment/Plan:  1  Lateral epicondylitis of left elbow     2  Pain in left elbow  CANCELED: XR elbow 3+ vw left   3  Type 2 diabetes mellitus with hyperglycemia, without long-term current use of insulin ([address] )         Scribe Attestation    I,:   Robbie Denny am acting as a scribe while in the presence of the attending physician :        I,:   Rosalie Lyons, DO personally performed the services described in this documentation    as scribed in my presence :          Teo's physical examination demonstrates signs and symptoms consistent with lateral epicondylitis in his left elbow  We discussed conservative treatment options today including physical therapy, bracing, and corticosteroid injection  Matt Wang is not interested in therapy or bracing at this time but was amenable to a corticosteroid injection  I did administer this injection today in the office and he tolerated it well  He does understand that this injection could cause his blood sugar to spike  I also provided him with a home exercise program and demonstrated some stretches to him  I explained that he may continue to participate in activity as tolerated and is not likely to cause any additional damage  I would like to see him back in three months for repeat clinical evaluation      Medium joint arthrocentesis: L elbow  Date/Time: 9/19/2019 10:25 AM  Consent given by: patient  Site marked: site marked  Timeout: Immediately prior to procedure a time out was called to verify the correct patient, procedure, equipment, support staff and site/side marked as required   Supporting Documentation  Indications: pain   Procedure Details  Location: elbow - L elbow  Preparation: Patient was prepped and draped in the usual sterile fashion  Needle size: 25 G  Approach: dorsal  Medications administered: 1 mL lidocaine 1 %; 40 mg triamcinolone acetonide 40 mg/mL    Patient tolerance: patient tolerated the procedure well with no immediate complications  Dressing:  Sterile dressing applied            Subjective: Odessa Wayne is a 48 y o  male who presents to the office today for initial evaluation of acute left elbow pain  He states that he began to experience an activity related and moderate sharp pain about the lateral aspect of his elbow approximately three days ago after performing reverse curls in the gym  He also reports that he began to experience a weak  at the same time  He does find that lifting objects with his palm down exacerbates his pain but is able to lift with his palm up without pain  He is very active and lifts weights five days per week  He denies any acute trauma  He denies any distal paresthesias of the upper extremity  Review of Systems   Constitutional: Negative for chills, fever and unexpected weight change  HENT: Negative for hearing loss, nosebleeds and sore throat  Eyes: Negative for pain, redness and visual disturbance  Respiratory: Negative for cough, shortness of breath and wheezing  Cardiovascular: Negative for chest pain, palpitations and leg swelling  Gastrointestinal: Negative for abdominal pain, nausea and vomiting  Endocrine: Negative for polyphagia and polyuria  Genitourinary: Negative for dysuria and hematuria  Musculoskeletal: Positive for arthralgias  Negative for joint swelling and myalgias  See HPI   Skin: Negative for rash and wound  Neurological: Negative for dizziness, numbness and headaches  Psychiatric/Behavioral: Negative for decreased concentration and suicidal ideas  The patient is not nervous/anxious            Past Medical History:   Diagnosis Date    Diabetes mellitus ([facility] [address] )        Past Surgical History:   Procedure Laterality Date    HEMICOLECTOMY      SKIN CANCER EXCISION         Family History   Problem Relation Age of Onset    Diabetes Mother     Lung cancer Mother     Hyperlipidemia Father     Hypertension Father     Hyperlipidemia Brother  Hypertension Brother        Social History     Occupational History    Not on file   Tobacco Use    Smoking status: Never Smoker    Smokeless tobacco: Never Used   Substance and Sexual Activity    Alcohol use: Not Currently    Drug use: Not on file    Sexual activity: Not on file         Current Outpatient Medications:     atorvastatin (LIPITOR) 40 mg tablet, TAKE 1 TABLET BY MOUTH EVERY DAY, Disp: 90 tablet, Rfl: 3    esomeprazole (NexIUM) 10 MG packet, Take 30 mg by mouth daily at bedtime , Disp: , Rfl:     naproxen (NAPROSYN) 500 mg tablet, Take 1 tablet (500 mg total) by mouth 2 (two) times a day with meals, Disp: 60 tablet, Rfl: 0    sitaGLIPtin-metFORMIN (JANUMET)  MG per tablet, Take 1 tablet by mouth 2 (two) times a day with meals, Disp: 180 tablet, Rfl: 1    zolpidem (AMBIEN) 10 mg tablet, Take 1 tablet (10 mg total) by mouth daily at bedtime as needed for sleep, Disp: 30 tablet, Rfl: 0    No Known Allergies    Objective:  Vitals:    09/19/19 0841   BP: 144/91   Pulse: 96       Left Hand Exam     Range of Motion   Wrist   Extension: normal   Flexion: normal       Left Elbow Exam     Tenderness   The patient is experiencing tenderness in the lateral epicondyle  Range of Motion   Extension: normal   Flexion: normal   Pronation: normal   Supination: normal     Other   Erythema: absent  Scars: absent  Sensation: normal  Pulse: present    Comments:  Tinel's radial: Negative  Painful resisted wrist extension with elbow flexion            Physical Exam   Constitutional: He is oriented to person, place, and time  He appears well-developed and well-nourished  HENT:   Head: Normocephalic and atraumatic  Eyes: Pupils are equal, round, and reactive to light  Conjunctivae are normal    Neck: Normal range of motion  Neck supple  Cardiovascular: Normal rate and intact distal pulses  Pulmonary/Chest: Effort normal  No respiratory distress     Musculoskeletal:   As noted in HPI Neurological: He is alert and oriented to person, place, and time  Skin: Skin is warm and dry  Psychiatric: He has a normal mood and affect  His behavior is normal    Vitals reviewed  I have personally reviewed pertinent films in PACS and my interpretation is as follows: No imaging reviewed

## 2019-09-19 NOTE — PATIENT INSTRUCTIONS
Tennis Elbow Exercises   AMBULATORY CARE:   Tennis elbow exercises  help decrease pain in your elbow, forearm, wrist, and hand  They also help strengthen your arm muscles and prevent further injury  Start these exercises slowly  Do the exercises on both arms  Stop if you feel pain  · Finger extensions:  Hold your fingers close together  Put a rubber band around the outside of your fingers and thumb  Spread your fingers apart and then slowly bring them together without letting the rubber band fall off  Repeat 40 times  · Wrist flexor stretch:  Hold your arm straight out in front of you with your palm facing down  Use your other hand to grasp your fingers  Keep your elbow straight and slowly bend your hand back  Your fingertips should point up and your palm should face away from you  Do this until you feel a stretch in the top of your wrist  Hold for 10 seconds  Repeat 5 times  · Wrist extensor stretch: This stretch is the opposite of the wrist flexor stretch  Hold your arm straight out in front of you with your palm facing down  Use your other hand to grasp your fingers  Keep your elbow straight and slowly bend your hand down  Your fingertips should point down and your palm should face you  Do this until you feel a stretch in your wrist  Hold for 10 seconds  Repeat 5 times  · Bicep curls:  Place your hand under your injured elbow for support  Turn your palm so that it faces up and hold a weight in your hand  Ask your healthcare provider how much weight you should use  Slowly bend and straighten your elbow 30 times  · :  Hold a soft rubber ball or tennis ball in your hand  Squeeze the ball as hard as you can and hold this position  Ask your healthcare provider how long to hold this position  Repeat this exercise as directed by your healthcare provider    Contact your healthcare provider if:   · You have increased pain or weakness in your arm, wrist, hand, or fingers  · You have new numbness or tingling in your arm, hand, or fingers  · You have questions or concerns about tennis elbow exercises  © 2017 [employer] Information is for End User's use only and may not be sold, redistributed or otherwise used for commercial purposes  All illustrations and images included in CareNotes® are the copyrighted property of [employer]  or [employer]  The above information is an  only  It is not intended as medical advice for individual conditions or treatments  Talk to your doctor, nurse or pharmacist before following any medical regimen to see if it is safe and effective for you

## 2019-09-27 ENCOUNTER — TELEPHONE (OUTPATIENT)
Dept: OBGYN CLINIC | Facility: HOSPITAL | Age: 54
End: 2019-09-27

## 2019-09-27 NOTE — TELEPHONE ENCOUNTER
Patient is calling because he is ready to schedule his sx on his lt knee  Per the office visit note on 7/19/19, patient has already signed the paperwork  Please call the patient back at [phone]

## 2019-10-01 PROBLEM — S83.232A COMPLEX TEAR OF MEDIAL MENISCUS OF LEFT KNEE AS CURRENT INJURY: Status: ACTIVE | Noted: 2019-10-01

## 2019-10-03 DIAGNOSIS — S83.232D COMPLEX TEAR OF MEDIAL MENISCUS OF LEFT KNEE AS CURRENT INJURY, SUBSEQUENT ENCOUNTER: Primary | ICD-10-CM

## 2019-10-08 LAB
ALBUMIN SERPL-MCNC: 4.3 G/DL (ref 3.5–5.5)
ALBUMIN/GLOB SERPL: 1.7 {RATIO} (ref 1.2–2.2)
ALP SERPL-CCNC: 53 IU/L (ref 39–117)
ALT SERPL-CCNC: 18 IU/L (ref 0–44)
AST SERPL-CCNC: 18 IU/L (ref 0–40)
BASOPHILS # BLD AUTO: 0 X10E3/UL (ref 0–0.2)
BASOPHILS NFR BLD AUTO: 0 %
BILIRUB SERPL-MCNC: 0.3 MG/DL (ref 0–1.2)
BUN SERPL-MCNC: 13 MG/DL (ref 6–24)
BUN/CREAT SERPL: 11 (ref 9–20)
CALCIUM SERPL-MCNC: 9.5 MG/DL (ref 8.7–10.2)
CHLORIDE SERPL-SCNC: 101 MMOL/L (ref 96–106)
CHOLEST SERPL-MCNC: 105 MG/DL (ref 100–199)
CO2 SERPL-SCNC: 27 MMOL/L (ref 20–29)
CREAT SERPL-MCNC: 1.17 MG/DL (ref 0.76–1.27)
EOSINOPHIL # BLD AUTO: 0.1 X10E3/UL (ref 0–0.4)
EOSINOPHIL NFR BLD AUTO: 1 %
ERYTHROCYTE [DISTWIDTH] IN BLOOD BY AUTOMATED COUNT: 13.5 % (ref 12.3–15.4)
EST. AVERAGE GLUCOSE BLD GHB EST-MCNC: 108 MG/DL
GLOBULIN SER-MCNC: 2.5 G/DL (ref 1.5–4.5)
GLUCOSE SERPL-MCNC: 116 MG/DL (ref 65–99)
HBA1C MFR BLD: 5.4 % (ref 4.8–5.6)
HCT VFR BLD AUTO: 47.3 % (ref 37.5–51)
HDLC SERPL-MCNC: 33 MG/DL
HGB BLD-MCNC: 15.6 G/DL (ref 13–17.7)
IMM GRANULOCYTES # BLD: 0 X10E3/UL (ref 0–0.1)
IMM GRANULOCYTES NFR BLD: 0 %
LDLC SERPL CALC-MCNC: 60 MG/DL (ref 0–99)
LYMPHOCYTES # BLD AUTO: 0.9 X10E3/UL (ref 0.7–3.1)
LYMPHOCYTES NFR BLD AUTO: 14 %
MCH RBC QN AUTO: 28.6 PG (ref 26.6–33)
MCHC RBC AUTO-ENTMCNC: 33 G/DL (ref 31.5–35.7)
MCV RBC AUTO: 87 FL (ref 79–97)
MICRODELETION SYND BLD/T FISH: NORMAL
MONOCYTES # BLD AUTO: 0.8 X10E3/UL (ref 0.1–0.9)
MONOCYTES NFR BLD AUTO: 13 %
NEUTROPHILS # BLD AUTO: 4.3 X10E3/UL (ref 1.4–7)
NEUTROPHILS NFR BLD AUTO: 72 %
PLATELET # BLD AUTO: 192 X10E3/UL (ref 150–450)
POTASSIUM SERPL-SCNC: 5.4 MMOL/L (ref 3.5–5.2)
PROT SERPL-MCNC: 6.8 G/DL (ref 6–8.5)
RBC # BLD AUTO: 5.46 X10E6/UL (ref 4.14–5.8)
SL AMB EGFR AFRICAN AMERICAN: 82 ML/MIN/1.73
SL AMB EGFR NON AFRICAN AMERICAN: 71 ML/MIN/1.73
SL AMB VLDL CHOLESTEROL CALC: 12 MG/DL (ref 5–40)
SODIUM SERPL-SCNC: 142 MMOL/L (ref 134–144)
TRIGL SERPL-MCNC: 58 MG/DL (ref 0–149)
WBC # BLD AUTO: 6 X10E3/UL (ref 3.4–10.8)

## 2019-10-09 RX ORDER — ALLOPURINOL 300 MG/1
TABLET ORAL
COMMUNITY
Start: 2019-10-02 | End: 2022-01-27

## 2019-10-09 NOTE — PROGRESS NOTES
Subjective:      Patient ID: Fredy Tejada is a 48 y o  male  Chief Complaint   Patient presents with    review blood work     cma       Here for diabetic follow up  Has lost a total of 55 pounds and is working out daily  Has seen urologist for his erectile dysfunction and does better with high dose viagra but is asking about daily cialis  Denies hypoglycemia  Overdue for eye exam   No side effects with medications  Denies chest pain, dyspnea, edema, palpitations  Has intermittent R foot burning and pins and needles  Has not noticed if it is positional   Has nothing in L foot  Has intermittent back issues but nothing currently; per history states he had an xray in the past that had some lower lumbar disc issues  He has had a hernia at the site of previous surgery for a few years, would like to get this fixed  The following portions of the patient's history were reviewed and updated as appropriate: allergies, current medications, past family history, past medical history, past social history, past surgical history and problem list     Review of Systems   Constitutional: Negative  Respiratory: Negative  Cardiovascular: Negative  Musculoskeletal: Negative for back pain  Neurological: Positive for numbness           Current Outpatient Medications   Medication Sig Dispense Refill    allopurinol (ZYLOPRIM) 300 mg tablet       ALPRAZolam (XANAX) 0 25 mg tablet Take 0 25 mg by mouth 4 (four) times a day as needed for anxiety      atorvastatin (LIPITOR) 40 mg tablet TAKE 1 TABLET BY MOUTH EVERY DAY (Patient taking differently: 40 mg daily at bedtime ) 90 tablet 3    esomeprazole (NexIUM) 10 MG packet Take 30 mg by mouth daily at bedtime       naproxen (NAPROSYN) 500 mg tablet Take 1 tablet (500 mg total) by mouth 2 (two) times a day with meals 60 tablet 0    Omega-3 Fatty Acids (FISH OIL) 1,000 mg Take 1,000 mg by mouth daily      sitaGLIPtin-metFORMIN (JANUMET)  MG per tablet Take 1 tablet by mouth daily 180 tablet 1    zolpidem (AMBIEN) 10 mg tablet Take 1 tablet (10 mg total) by mouth daily at bedtime as needed for sleep 30 tablet 0    tadalafil (CIALIS) 2 5 MG tablet Take 1 tablet (2 5 mg total) by mouth daily 30 tablet 3    VASCEPA 1 g CAPS        No current facility-administered medications for this visit  Objective:    /80   Pulse 97   Temp (!) 97 4 °F (36 3 °C)   Resp 16   Ht 6' 3" (1 905 m)   Wt 112 kg (247 lb)   SpO2 97%   BMI 30 87 kg/m²        Physical Exam   Constitutional: He appears well-developed and well-nourished  Eyes: Conjunctivae are normal    Neck: Neck supple  No JVD present  No thyromegaly present  Cardiovascular: Normal rate, regular rhythm, normal heart sounds and intact distal pulses  Exam reveals no gallop and no friction rub  Pulses are no weak pulses  No murmur heard  Pulses:       Dorsalis pedis pulses are 2+ on the right side, and 2+ on the left side  Pulmonary/Chest: Effort normal and breath sounds normal  He has no wheezes  He has no rales  Abdominal: Soft  Bowel sounds are normal  He exhibits no distension  There is no tenderness  4 cm incisional hernia mid abdomen to right of midline  Musculoskeletal: He exhibits no edema  Feet:   Right Foot:   Skin Integrity: Negative for ulcer, skin breakdown, erythema, warmth, callus or dry skin  Left Foot:   Skin Integrity: Negative for ulcer, skin breakdown, erythema, warmth, callus or dry skin  Patient's shoes and socks removed  Right Foot/Ankle   Right Foot Inspection  Skin Exam: skin normal and skin intact no dry skin, no warmth, no callus, no erythema, no maceration, no abnormal color, no pre-ulcer, no ulcer and no callus                          Toe Exam: ROM and strength within normal limits  Sensory       Monofilament testing: intact  Vascular  Capillary refills: < 3 seconds  The right DP pulse is 2+       Left Foot/Ankle  Left Foot Inspection  Skin Exam: skin normal and skin intactno dry skin, no warmth, no erythema, no maceration, normal color, no pre-ulcer, no ulcer and no callus                         Toe Exam: ROM and strength within normal limits                   Sensory       Monofilament: intact  Vascular  Capillary refills: < 3 seconds  The left DP pulse is 2+  Assign Risk Category:  No deformity present; No loss of protective sensation; No weak pulses       Risk: 0      Assessment/Plan:    Type 2 diabetes mellitus with hyperglycemia, without long-term current use of insulin ([facility])    Lab Results   Component Value Date    HGBA1C 5 4 10/08/2019     This is much improved and we will decrease his janumet and recheck labs with follow up appointment in 4 months  IRIS exam done today  Advised on need for good foot and eye care, continued exercise and weight and dietary management  Mixed hyperlipidemia  Well controlled on atorvastatin and will continue  Erectile dysfunction  Will try daily cialis, if we cannot get this approved he is aware he will need to go back to urologist for this  Diagnoses and all orders for this visit:    Type 2 diabetes mellitus with hyperglycemia, without long-term current use of insulin ([facility])  -     IRIS Diabetic eye exam  -     CBC and differential; Future  -     Comprehensive metabolic panel; Future  -     Lipid panel; Future  -     HEMOGLOBIN A1C W/ EAG ESTIMATION; Future  -     Microalbumin / creatinine urine ratio    Mixed hyperlipidemia  -     CBC and differential; Future  -     Comprehensive metabolic panel; Future  -     Lipid panel; Future    Type 2 diabetes mellitus without complication, without long-term current use of insulin ([facility])  -     sitaGLIPtin-metFORMIN (JANUMET)  MG per tablet; Take 1 tablet by mouth daily  -     CBC and differential; Future  -     Comprehensive metabolic panel; Future  -     Lipid panel;  Future    Erectile dysfunction, unspecified erectile dysfunction type  -     tadalafil (CIALIS) 2 5 MG tablet; Take 1 tablet (2 5 mg total) by mouth daily    Numbness and tingling of foot  Comments: Will check EMG/NCS, suspect lumbar radiculopathy  Orders:  -     EMG 1 Limb; Future    Incisional hernia, without obstruction or gangrene  Comments:  Surgical referral    Orders:  -     Ambulatory referral to General Surgery; Future    Other orders  -     allopurinol (ZYLOPRIM) 300 mg tablet  -     VASCEPA 1 g CAPS          Return in about 4 months (around 2/10/2020)         Mikal Salvador MD

## 2019-10-10 ENCOUNTER — OFFICE VISIT (OUTPATIENT)
Dept: FAMILY MEDICINE CLINIC | Facility: CLINIC | Age: 54
End: 2019-10-10
Payer: COMMERCIAL

## 2019-10-10 ENCOUNTER — TRANSCRIBE ORDERS (OUTPATIENT)
Dept: ADMINISTRATIVE | Facility: HOSPITAL | Age: 54
End: 2019-10-10

## 2019-10-10 ENCOUNTER — OFFICE VISIT (OUTPATIENT)
Dept: LAB | Facility: HOSPITAL | Age: 54
End: 2019-10-10
Attending: ORTHOPAEDIC SURGERY
Payer: COMMERCIAL

## 2019-10-10 VITALS
DIASTOLIC BLOOD PRESSURE: 80 MMHG | SYSTOLIC BLOOD PRESSURE: 126 MMHG | RESPIRATION RATE: 16 BRPM | HEART RATE: 97 BPM | OXYGEN SATURATION: 97 % | TEMPERATURE: 97.4 F | WEIGHT: 247 LBS | HEIGHT: 75 IN | BODY MASS INDEX: 30.71 KG/M2

## 2019-10-10 DIAGNOSIS — R20.0 NUMBNESS AND TINGLING OF FOOT: ICD-10-CM

## 2019-10-10 DIAGNOSIS — R20.2 NUMBNESS AND TINGLING OF FOOT: ICD-10-CM

## 2019-10-10 DIAGNOSIS — E78.2 MIXED HYPERLIPIDEMIA: ICD-10-CM

## 2019-10-10 DIAGNOSIS — S83.232A COMPLEX TEAR OF MEDIAL MENISCUS OF LEFT KNEE AS CURRENT INJURY, INITIAL ENCOUNTER: ICD-10-CM

## 2019-10-10 DIAGNOSIS — E11.65 TYPE 2 DIABETES MELLITUS WITH HYPERGLYCEMIA, WITHOUT LONG-TERM CURRENT USE OF INSULIN (HCC): Primary | ICD-10-CM

## 2019-10-10 DIAGNOSIS — E11.9 TYPE 2 DIABETES MELLITUS WITHOUT COMPLICATION, WITHOUT LONG-TERM CURRENT USE OF INSULIN (HCC): ICD-10-CM

## 2019-10-10 DIAGNOSIS — N52.9 ERECTILE DYSFUNCTION, UNSPECIFIED ERECTILE DYSFUNCTION TYPE: ICD-10-CM

## 2019-10-10 DIAGNOSIS — Z01.812 PRE-PROCEDURAL LABORATORY EXAMINATION: ICD-10-CM

## 2019-10-10 DIAGNOSIS — K43.2 INCISIONAL HERNIA, WITHOUT OBSTRUCTION OR GANGRENE: ICD-10-CM

## 2019-10-10 LAB
LEFT EYE DIABETIC RETINOPATHY: NORMAL
LEFT EYE IMAGE QUALITY: NORMAL
LEFT EYE MACULAR EDEMA: NORMAL
LEFT EYE OTHER RETINOPATHY: NORMAL
RIGHT EYE DIABETIC RETINOPATHY: NORMAL
RIGHT EYE IMAGE QUALITY: NORMAL
RIGHT EYE MACULAR EDEMA: NORMAL
RIGHT EYE OTHER RETINOPATHY: NORMAL
SEVERITY (EYE EXAM): NORMAL

## 2019-10-10 PROCEDURE — 99214 OFFICE O/P EST MOD 30 MIN: CPT | Performed by: INTERNAL MEDICINE

## 2019-10-10 PROCEDURE — 93005 ELECTROCARDIOGRAM TRACING: CPT

## 2019-10-10 RX ORDER — ICOSAPENT ETHYL 1000 MG/1
CAPSULE ORAL
COMMUNITY
Start: 2019-10-02

## 2019-10-10 RX ORDER — TADALAFIL 2.5 MG/1
2.5 TABLET ORAL DAILY
Qty: 30 TABLET | Refills: 3 | Status: SHIPPED | OUTPATIENT
Start: 2019-10-10 | End: 2022-01-27

## 2019-10-10 RX ORDER — CHLORAL HYDRATE 500 MG
1000 CAPSULE ORAL DAILY
COMMUNITY

## 2019-10-10 RX ORDER — ALPRAZOLAM 0.25 MG/1
0.25 TABLET ORAL 4 TIMES DAILY PRN
COMMUNITY
End: 2022-01-27 | Stop reason: SDUPTHER

## 2019-10-10 NOTE — PRE-PROCEDURE INSTRUCTIONS
My Surgical Experience    The following information was developed to assist you to prepare for your operation  What do I need to do before coming to the hospital?   Arrange for a responsible person to drive you to and from the hospital    Arrange care for your children at home  Children are not allowed in the recovery areas of the hospital   Plan to wear clothing that is easy to put on and take off  If you are having shoulder surgery, wear a shirt that buttons or zippers in the front  Bathing  o Shower the evening before and the morning of your surgery with an antibacterial soap  Please refer to the Pre Op Showering Instructions for Surgery Patients Sheet   o Remove nail polish and all body piercing jewelry  o Do not shave any body part for at least 24 hours before surgery-this includes face, arms, legs and upper body  Food  o Nothing to eat or drink after midnight the night before your surgery  This includes candy and chewing gum  o Exception: If your surgery is after 12:00pm (noon), you may have clear liquids such as 7-Up®, ginger ale, apple or cranberry juice, Jell-O®, water, or clear broth until 8:00 am  o Do not drink milk or juice with pulp on the morning before surgery  o Do not drink alcohol 24 hours before surgery  Medicine  o Follow instructions you received from your surgeon about which medicines you may take on the day of surgery  o If instructed to take medicine on the morning of surgery, take pills with just a small sip of water  Call your prescribing doctor for specific infroamtion on what to do if you take insulin    What should I bring to the hospital?    Bring:  Ashwin Degroot or a walker, if you have them, for foot or knee surgery   A list of the daily medicines, vitamins, minerals, herbals and nutritional supplements you take   Include the dosages of medicines and the time you take them each day   Glasses, dentures or hearing aids   Minimal clothing; you will be wearing hospital sleepwear   Photo ID; required to verify your identity   If you have a Living Will or Power of , bring a copy of the documents   If you have an ostomy, bring an extra pouch and any supplies you use    Do not bring   Medicines or inhalers   Money, valuables or jewelry    What other information should I know about the day of surgery?  Notify your surgeons if you develop a cold, sore throat, cough, fever, rash or any other illness   Report to the Ambulatory Surgical/Same Day Surgery Unit   You will be instructed to stop at Registration only if you have not been pre-registered   Inform your  fi they do not stay that they will be asked by the staff to leave a phone number where they can be reached   Be available to be reached before surgery  In the event the operating room schedule changes, you may be asked to come in earlier or later than expected    *It is important to tell your doctor and others involved in your health care if you are taking or have been taking any non-prescription drugs, vitamins, minerals, herbals or other nutritional supplements  Any of these may interact with some food or medicines and cause a reaction      Pre-Surgery Instructions:   Medication Instructions    allopurinol (ZYLOPRIM) 300 mg tablet Instructed patient per Anesthesia Guidelines   ALPRAZolam (XANAX) 0 25 mg tablet Instructed patient per Anesthesia Guidelines   atorvastatin (LIPITOR) 40 mg tablet Instructed patient per Anesthesia Guidelines   esomeprazole (NexIUM) 10 MG packet Instructed patient per Anesthesia Guidelines   Omega-3 Fatty Acids (FISH OIL) 1,000 mg Instructed patient per Anesthesia Guidelines   sitaGLIPtin-metFORMIN (JANUMET)  MG per tablet Instructed patient per Anesthesia Guidelines   zolpidem (AMBIEN) 10 mg tablet Instructed patient per Anesthesia Guidelines

## 2019-10-10 NOTE — ASSESSMENT & PLAN NOTE
Will try daily cialis, if we cannot get this approved he is aware he will need to go back to urologist for this

## 2019-10-10 NOTE — ASSESSMENT & PLAN NOTE
Lab Results   Component Value Date    HGBA1C 5 4 10/08/2019     This is much improved and we will decrease his janumet and recheck labs with follow up appointment in 4 months  IRIS exam done today  Advised on need for good foot and eye care, continued exercise and weight and dietary management

## 2019-10-14 ENCOUNTER — TELEPHONE (OUTPATIENT)
Dept: FAMILY MEDICINE CLINIC | Facility: CLINIC | Age: 54
End: 2019-10-14

## 2019-10-14 LAB
ATRIAL RATE: 86 BPM
P AXIS: 103 DEGREES
PR INTERVAL: 174 MS
QRS AXIS: 163 DEGREES
QRSD INTERVAL: 106 MS
QT INTERVAL: 334 MS
QTC INTERVAL: 399 MS
T WAVE AXIS: 142 DEGREES
VENTRICULAR RATE: 86 BPM

## 2019-10-14 PROCEDURE — 93010 ELECTROCARDIOGRAM REPORT: CPT | Performed by: INTERNAL MEDICINE

## 2019-10-14 NOTE — TELEPHONE ENCOUNTER
PA needed for Tadalafil  on [employer]  ID [id number]   [facility]  Forms completed and faxed  Waiting for a decision or more questions    elizabeth

## 2019-10-15 NOTE — TELEPHONE ENCOUNTER
PA was denied  Prescription was put on a discount card  No further action needed from the Office    Task Complete  [id number]

## 2019-10-16 ENCOUNTER — ANESTHESIA EVENT (OUTPATIENT)
Dept: PERIOP | Facility: HOSPITAL | Age: 54
End: 2019-10-16
Payer: COMMERCIAL

## 2019-10-17 ENCOUNTER — HOSPITAL ENCOUNTER (OUTPATIENT)
Facility: HOSPITAL | Age: 54
Setting detail: OUTPATIENT SURGERY
Discharge: HOME | End: 2019-10-17
Attending: ORTHOPAEDIC SURGERY | Admitting: ORTHOPAEDIC SURGERY
Payer: COMMERCIAL

## 2019-10-17 ENCOUNTER — ANESTHESIA (OUTPATIENT)
Dept: PERIOP | Facility: HOSPITAL | Age: 54
End: 2019-10-17
Payer: COMMERCIAL

## 2019-10-17 VITALS
WEIGHT: 250.2 LBS | OXYGEN SATURATION: 98 % | SYSTOLIC BLOOD PRESSURE: 135 MMHG | HEIGHT: 75 IN | DIASTOLIC BLOOD PRESSURE: 74 MMHG | TEMPERATURE: 97.8 F | HEART RATE: 88 BPM | BODY MASS INDEX: 31.11 KG/M2 | RESPIRATION RATE: 18 BRPM

## 2019-10-17 LAB — GLUCOSE SERPL-MCNC: 119 MG/DL (ref 65–140)

## 2019-10-17 PROCEDURE — 29881 ARTHRS KNE SRG MNISECTMY M/L: CPT | Performed by: ORTHOPAEDIC SURGERY

## 2019-10-17 PROCEDURE — 82948 REAGENT STRIP/BLOOD GLUCOSE: CPT

## 2019-10-17 PROCEDURE — 29881 ARTHRS KNE SRG MNISECTMY M/L: CPT | Performed by: PHYSICIAN ASSISTANT

## 2019-10-17 PROCEDURE — NC001 PR NO CHARGE: Performed by: ORTHOPAEDIC SURGERY

## 2019-10-17 RX ORDER — DEXAMETHASONE SODIUM PHOSPHATE 4 MG/ML
INJECTION, SOLUTION INTRA-ARTICULAR; INTRALESIONAL; INTRAMUSCULAR; INTRAVENOUS; SOFT TISSUE AS NEEDED
Status: DISCONTINUED | OUTPATIENT
Start: 2019-10-17 | End: 2019-10-17 | Stop reason: SURG

## 2019-10-17 RX ORDER — LIDOCAINE HYDROCHLORIDE 10 MG/ML
INJECTION, SOLUTION INFILTRATION; PERINEURAL AS NEEDED
Status: DISCONTINUED | OUTPATIENT
Start: 2019-10-17 | End: 2019-10-17 | Stop reason: SURG

## 2019-10-17 RX ORDER — KETOROLAC TROMETHAMINE 30 MG/ML
INJECTION, SOLUTION INTRAMUSCULAR; INTRAVENOUS AS NEEDED
Status: DISCONTINUED | OUTPATIENT
Start: 2019-10-17 | End: 2019-10-17 | Stop reason: SURG

## 2019-10-17 RX ORDER — FENTANYL CITRATE/PF 50 MCG/ML
50 SYRINGE (ML) INJECTION
Status: DISCONTINUED | OUTPATIENT
Start: 2019-10-17 | End: 2019-10-17 | Stop reason: HOSPADM

## 2019-10-17 RX ORDER — ONDANSETRON 2 MG/ML
4 INJECTION INTRAMUSCULAR; INTRAVENOUS ONCE AS NEEDED
Status: DISCONTINUED | OUTPATIENT
Start: 2019-10-17 | End: 2019-10-17 | Stop reason: HOSPADM

## 2019-10-17 RX ORDER — BUPIVACAINE HYDROCHLORIDE AND EPINEPHRINE 2.5; 5 MG/ML; UG/ML
INJECTION, SOLUTION INFILTRATION; PERINEURAL AS NEEDED
Status: DISCONTINUED | OUTPATIENT
Start: 2019-10-17 | End: 2019-10-17 | Stop reason: HOSPADM

## 2019-10-17 RX ORDER — FENTANYL CITRATE 50 UG/ML
INJECTION, SOLUTION INTRAMUSCULAR; INTRAVENOUS AS NEEDED
Status: DISCONTINUED | OUTPATIENT
Start: 2019-10-17 | End: 2019-10-17 | Stop reason: SURG

## 2019-10-17 RX ORDER — SODIUM CHLORIDE, SODIUM LACTATE, POTASSIUM CHLORIDE, CALCIUM CHLORIDE 600; 310; 30; 20 MG/100ML; MG/100ML; MG/100ML; MG/100ML
100 INJECTION, SOLUTION INTRAVENOUS CONTINUOUS
Status: DISCONTINUED | OUTPATIENT
Start: 2019-10-17 | End: 2019-10-17 | Stop reason: HOSPADM

## 2019-10-17 RX ORDER — MAGNESIUM HYDROXIDE 1200 MG/15ML
LIQUID ORAL AS NEEDED
Status: DISCONTINUED | OUTPATIENT
Start: 2019-10-17 | End: 2019-10-17 | Stop reason: HOSPADM

## 2019-10-17 RX ORDER — CEFAZOLIN SODIUM 2 G/50ML
2000 SOLUTION INTRAVENOUS ONCE
Status: COMPLETED | OUTPATIENT
Start: 2019-10-17 | End: 2019-10-17

## 2019-10-17 RX ORDER — PROPOFOL 10 MG/ML
INJECTION, EMULSION INTRAVENOUS AS NEEDED
Status: DISCONTINUED | OUTPATIENT
Start: 2019-10-17 | End: 2019-10-17 | Stop reason: SURG

## 2019-10-17 RX ORDER — ONDANSETRON 2 MG/ML
INJECTION INTRAMUSCULAR; INTRAVENOUS AS NEEDED
Status: DISCONTINUED | OUTPATIENT
Start: 2019-10-17 | End: 2019-10-17 | Stop reason: SURG

## 2019-10-17 RX ORDER — SODIUM CHLORIDE, SODIUM LACTATE, POTASSIUM CHLORIDE, CALCIUM CHLORIDE 600; 310; 30; 20 MG/100ML; MG/100ML; MG/100ML; MG/100ML
125 INJECTION, SOLUTION INTRAVENOUS CONTINUOUS
Status: DISCONTINUED | OUTPATIENT
Start: 2019-10-17 | End: 2019-10-17 | Stop reason: SDUPTHER

## 2019-10-17 RX ADMIN — FENTANYL CITRATE 100 MCG: 50 INJECTION, SOLUTION INTRAMUSCULAR; INTRAVENOUS at 07:11

## 2019-10-17 RX ADMIN — CEFAZOLIN SODIUM 2000 MG: 2 SOLUTION INTRAVENOUS at 07:15

## 2019-10-17 RX ADMIN — SODIUM CHLORIDE, SODIUM LACTATE, POTASSIUM CHLORIDE, AND CALCIUM CHLORIDE 125 ML/HR: .6; .31; .03; .02 INJECTION, SOLUTION INTRAVENOUS at 06:23

## 2019-10-17 RX ADMIN — SODIUM CHLORIDE, SODIUM LACTATE, POTASSIUM CHLORIDE, AND CALCIUM CHLORIDE: .6; .31; .03; .02 INJECTION, SOLUTION INTRAVENOUS at 07:06

## 2019-10-17 RX ADMIN — LIDOCAINE HYDROCHLORIDE 50 MG: 10 INJECTION, SOLUTION INFILTRATION; PERINEURAL at 07:14

## 2019-10-17 RX ADMIN — KETOROLAC TROMETHAMINE 30 MG: 30 INJECTION, SOLUTION INTRAMUSCULAR at 07:48

## 2019-10-17 RX ADMIN — DEXAMETHASONE SODIUM PHOSPHATE 4 MG: 4 INJECTION, SOLUTION INTRA-ARTICULAR; INTRALESIONAL; INTRAMUSCULAR; INTRAVENOUS; SOFT TISSUE at 07:18

## 2019-10-17 RX ADMIN — ONDANSETRON 4 MG: 2 INJECTION INTRAMUSCULAR; INTRAVENOUS at 07:18

## 2019-10-17 RX ADMIN — PROPOFOL 200 MG: 10 INJECTION, EMULSION INTRAVENOUS at 07:14

## 2019-10-17 NOTE — OP NOTE
OPERATIVE REPORT  PATIENT NAME: Yovani Silva    :  1965  MRN: [id number]  Pt Location: [facility] OR ROOM 02    SURGERY DATE: 10/17/2019    Surgeon(s) and Role:     * Ramon Tejada MD - Primary     * Galina Long PA-C - Assisting necessary for procedure for assistance with minimally invasive arthroscopic techniques for medial meniscectomy with utilization camera and shaver and biter  George SHELBY  And OTC 2nd assistant    Preop Diagnosis:  Complex tear of medial meniscus of left knee as current injury, initial encounter [S83 232A]    Post-Op Diagnosis Codes:     * Complex tear of medial meniscus of left knee as current injury, initial encounter [S83 232A]    Procedure(s) (LRB):  ARTHROSCOPY KNEE MEDIAL MENISCECTOMY (Left)    Specimen(s):  * No specimens in log *    Estimated Blood Loss:   Minimal    Drains:  * No LDAs found *    Anesthesia Type:   General    Operative Indications:  Complex tear of medial meniscus of left knee as current injury, initial encounter Duke Saini is a 53-year-old male who has been suffering with left knee pain  MRI demonstrated a complex medial meniscus tear that was fairly large  He had failed conservative measures understood the risks and benefits of left knee arthroscopy for medial meniscectomy  The risks are inclusive of but not limited to infection, stiffness, blood clots, worsening of symptoms, failure to regain full strength and ability, and need for further surgery  Operative Findings:  Left knee with a stable exam under anesthesia to varus and valgus stress as well as the anterior and posterior drawer and range of motion 0-120 preoperatively  Intra-articular findings demonstrated good appearance of articular cartilage in the patellofemoral and lateral compartments with no loose bodies in either gutter and an intact lateral meniscus and intact ACL    The medial compartment however demonstrated grade 2-3 changes along medial femoral condyle and tibial plateau that were fairly scattered and a very large and complex set of tears along the medial meniscus that encompassed posterior 2/3 of that meniscus  There was also a large flap of tissue in the body requiring debridement with a series of biters and Peter  Complications:   None    Procedure and Technique:  Ramirez Helms was taken to the operating room and placed supine on the OR table  He was given preoperative IV antibiotics  General anesthesia was induced in the left knee was taken through exam under anesthesia as described above  The left lower extremity was prepped and draped in usual sterile fashion  A surgical time-out was taken  We injected local anesthetic into both portals and the anterolateral portal was made with 11 blade  Diagnostic arthroscopy was begun an anteromedial portal was made with 11 blade  We demonstrated good articular cartilage in the patellofemoral joint and no loose bodies in either gutter  The lateral compartment was also in good condition from articular cartilage and meniscal standpoint  The ACL was intact  The medial compartment demonstrated a very large and complex series of tears in the medial meniscus that ranged from the body to the posterior 3rd  There was also a large flap of tissue present in the body of the meniscus requiring debridement with a biter and shaver to a stable rim of tissue  We then switched portals to get the more anterior aspect of the tear more readily with the biter and shaver  We had a nice smooth rim of meniscal tissue remaining at the end of the procedure  We then removed arthroscopic equipment and closed the portals with 4-0 nylon suture  Local anesthetic was injected into the knee and dry, sterile dressings were applied with an Ace bandage  He tolerated the procedure well and transferred to recovery room stable condition  He will follow up in 1 week for suture removal   He will be on aspirin for DVT prophylaxis    He will start physical therapy tomorrow     I was present for the entire procedure and A qualified resident physician was not available    Patient Disposition:  PACU     SIGNATURE: Isabella Denney MD  DATE: October 17, 2019  TIME: 7:52 AM

## 2019-10-17 NOTE — DISCHARGE INSTRUCTIONS
INSTRUCTIONS FOLLOWING YOUR KNEE ARTHROSCOPY    FIRST FOLLOW-UP VISIT AFTER SURGERY      Call the office the day after your surgery & make an appointment for 1 week to see Dr Joseph Bolanos  At that appointment, your stitches will be removed  CANE OR CRUTCHES      You may put as much weight on your leg as tolerated when using the crutches/cane  When you feel that the crutches/cane is not necessary, you may discontinue its use  Use common sense, let pain be your guide for your activities  Climbing stairs, walking and sitting are all permitted as you tolerate them  EXERCISE PROGRAM      At your 1st follow-up visit you will be given a prescription for physical therapy if you have not already been given one  SHOWERING      Keep original bandage on for 48 hours after surgery & replace with band-aids  You should keep the band-aids on until you see Dr Joseph Bolanos  After 48 hours, it is okay to get your incision wet & you may shower  Do not take any baths or soak in a hot tub or pool until your stitches have been removed  INCISION SITE      You may notice a small amount of blood on your bandage, about the size of a quarter, this is normal and there is no need to worry  If you notice uncontrollable or excessive bleeding, oozing, or redness of the wound please call the office  SWELLING AND DISCOMFORT      You will experience some pain and discomfort after surgery  You will be given a prescription for pain medication  Take the medication as prescribed  If the discomfort is mild, you can take 1 or 2 Tylenol, every 4-6 hours as needed, instead of the prescribed pain medication  You will notice some swelling of your knee after surgery, this is normal      To help reduce the swelling, elevate your leg as much as possible the first two days  In addition, you may place ice on your knee to reduce swelling  Place a plastic bag filled with ice, wrapped in a thin towel, on your knee   Do not keep ice on for more than 15-20 minutes, repeat 4-5 TIMES A DAY, IF POSSIBLE  BLOOD CLOT PREVENTION    Unless otherwise instructed, take one 325 mg aspirin daily for the first 3 weeks following surgery  This is to help decrease the risk of blood clots  If at any time you have discomfort, swelling, or redness in the calf (behind the leg between the knee and the ankle)  or difficulty breathing or heaviness in the chest, please call the doctor immediately  TEMPERATURE      A temperature of less than 101 degrees is common for the first 1-2 days after surgery  If your temperature is elevated above 101 degrees, call the office  IF YOU HAVE ANY OTHER CONCERNS OR QUESTIONS AT ANY TIME, DO NOT HESITATE TO CALL THE OFFICE [phone]

## 2019-10-17 NOTE — H&P
Assessment/Plan:  Left knee medial meniscus tear  The patietn would like to proceed with left knee arthroscpy with medial meniscectomy  We discussed the procedure and risks at length, including but not limited to, infection, bleeding, wound issues, nerve injury, blood clot, stiffness, failure of procedure, and need for additional surgery  We will see the patient back at the time of surgery       Subjective: Amari Pearce is a 48 y o  male with left knee medial meniscus tear  Review of Systems   Constitutional: Positive for activity change  Negative for chills, fever and unexpected weight change  HENT: Negative for hearing loss, nosebleeds and sore throat  Eyes: Negative for pain, redness and visual disturbance  Respiratory: Negative for cough, shortness of breath and wheezing  Cardiovascular: Negative for chest pain, palpitations and leg swelling  Gastrointestinal: Negative for abdominal pain, nausea and vomiting  Endocrine: Negative for polyphagia and polyuria  Genitourinary: Negative for dysuria and hematuria  Musculoskeletal:        See HPI   Skin: Negative for rash and wound  Neurological: Negative for dizziness, numbness and headaches  Psychiatric/Behavioral: Negative for decreased concentration and suicidal ideas  The patient is not nervous/anxious            Past Medical History:   Diagnosis Date    Anxiety     Colon polyp     Diabetes mellitus ([facility] [address] )     Hernia of abdominal wall     Hyperlipidemia        Past Surgical History:   Procedure Laterality Date    COLON SURGERY  2013    HEMICOLECTOMY      SKIN CANCER EXCISION      WISDOM TOOTH EXTRACTION         Family History   Problem Relation Age of Onset    Diabetes Mother     Lung cancer Mother     Hyperlipidemia Father     Hypertension Father     Hyperlipidemia Brother     Hypertension Brother        Social History     Occupational History    Not on file   Tobacco Use    Smoking status: Never Smoker    Smokeless tobacco: Never Used   Substance and Sexual Activity    Alcohol use: Not Currently    Drug use: Not on file    Sexual activity: Not on file         Current Facility-Administered Medications:     lactated ringers infusion, 125 mL/hr, Intravenous, Continuous, Maggy Verdugo MD, Last Rate: 125 mL/hr at 10/17/19 0623, 125 mL/hr at 10/17/19 0623    No Known Allergies    Objective:  Vitals:    10/17/19 0615   BP: 139/80   Pulse: 86   Resp: 16   Temp: (!) 97 2 °F (36 2 °C)   SpO2: 93%       Ortho Exam    Physical Exam   Constitutional: He is oriented to person, place, and time  He appears well-developed and well-nourished  HENT:   Head: Normocephalic and atraumatic  Eyes: Pupils are equal, round, and reactive to light  Conjunctivae are normal    Neck: Normal range of motion  Neck supple  Cardiovascular: Normal rate and intact distal pulses  Pulmonary/Chest: Effort normal  No respiratory distress  Musculoskeletal:   As noted in HPI   Neurological: He is alert and oriented to person, place, and time  Skin: Skin is warm and dry  Psychiatric: He has a normal mood and affect  His behavior is normal    Nursing note and vitals reviewed  Left knee: Medial joint line tenderness

## 2019-10-17 NOTE — ANESTHESIA PREPROCEDURE EVALUATION
Review of Systems/Medical History  Patient summary reviewed  Chart reviewed  No history of anesthetic complications     Cardiovascular  Exercise tolerance (METS): >4,  Hyperlipidemia,    Pulmonary  Negative pulmonary ROS        GI/Hepatic    GERD well controlled,             Endo/Other  Diabetes ,   Obesity    GYN       Hematology  Negative hematology ROS      Musculoskeletal  Negative musculoskeletal ROS        Neurology  Negative neurology ROS      Psychology   Anxiety,              Physical Exam    Airway    Mallampati score: I  TM Distance: >3 FB  Neck ROM: full     Dental       Cardiovascular  Rhythm: regular, Rate: normal,     Pulmonary  Breath sounds clear to auscultation,     Other Findings        Anesthesia Plan  ASA Score- 2     Anesthesia Type- general with ASA Monitors  Additional Monitors:   Airway Plan: LMA  Plan Factors-  Patient did not smoke on day of surgery  Induction- intravenous  Postoperative Plan- Plan for postoperative opioid use  Planned trial extubation    Informed Consent- Anesthetic plan and risks discussed with patient  I personally reviewed this patient with the CRNA  Discussed and agreed on the Anesthesia Plan with the CRNA  Jean Paul Mahmood

## 2019-10-18 ENCOUNTER — EVALUATION (OUTPATIENT)
Dept: PHYSICAL THERAPY | Facility: CLINIC | Age: 54
End: 2019-10-18
Payer: COMMERCIAL

## 2019-10-18 DIAGNOSIS — S83.232D COMPLEX TEAR OF MEDIAL MENISCUS OF LEFT KNEE AS CURRENT INJURY, SUBSEQUENT ENCOUNTER: Primary | ICD-10-CM

## 2019-10-18 PROCEDURE — 97161 PT EVAL LOW COMPLEX 20 MIN: CPT

## 2019-10-18 NOTE — PROGRESS NOTES
PT Evaluation     Today's date: 10/18/2019  Patient name: Nic Jara  : 1965  MRN: [id number]  Referring provider: Melina Mazariegos MD  Dx:   Encounter Diagnosis     ICD-10-CM    1  Complex tear of medial meniscus of left knee as current injury, subsequent encounter S83 232D Ambulatory referral to Physical Therapy                  Assessment  Assessment details: Nic Jara is a 48 y o  male who presents to physical therapy with pain, decreased LE range of motion, decreased LE strength, impaired function, decreased activity tolerance, poor balance, swelling  and poor body mechanics  Patient's clinical presentation is consistent with their referring diagnosis of Complex tear of medial meniscus of left knee as current injury, subsequent encounter  (primary encounter diagnosis)  The pt presents with functional limitations of ADLs, recreational activities, work-related activities, participation in social activities, ambulation and stair negotiation  Pt would benefit from physical therapy services to address these limitations and maximize function  Pt was instructed and educated on gait training, home exercise program, post-op contraindications/precautions, weight bearing status and wound care today and demonstrates understanding     Impairments: abnormal gait, abnormal muscle firing, abnormal muscle tone, abnormal or restricted ROM, abnormal movement, activity intolerance, impaired balance, impaired physical strength, lacks appropriate home exercise program, pain with function and weight-bearing intolerance    Symptom irritability: moderateUnderstanding of Dx/Px/POC: good   Prognosis: good    Goals  Short Term Goals (4 weeks)  Pt will be independent in basic Home Exercise Program  Pt will be able to ambulate 20 minutes with pain no more than 2/10  Pt will be able to ascend 1 flight of stairs reciprocally  Pt will demonstrate improved gait mechanics with terminal knee extension at heel strike      Long Term Goals (6-8 weeks)  Pt will be independent in comprehensive Home Exercise Program  Pt will be able to perform ADLs with pain no more than 2/10  Pt will demonstrate improved SLS balance to at least 30 sec  Pt will demonstrate improved knee and hip MMT strength to at least 4+/5  Pt will demonstrate good hip hinge and squatting mechanics    Plan  Patient would benefit from: skilled PT  Referral necessary: No  Planned modality interventions: cryotherapy and thermotherapy: hydrocollator packs  Planned therapy interventions: ADL training, activity modification, balance, body mechanics training, flexibility, functional ROM exercises, gait training, graded exercise, home exercise program, transfer training, therapeutic exercise, therapeutic activities, stretching, strengthening, postural training, patient education, neuromuscular re-education, manual therapy and joint mobilization  Frequency: 2x week  Duration in weeks: 8  Treatment plan discussed with: patient        Subjective Evaluation    History of Present Illness  Date of surgery: 10/17/2019  Mechanism of injury: surgery  Mechanism of injury: Pt reports left knee pain that started about 8 months ago while pulling his foot from behind his chair  Pain has been pretty consistent but pt has continued to work out and do activity as tolerated  Decided to proceed with surgery when pain did not get any better and was persistent  Pt reports he feels better today already than a couple of days ago  Ambulating with no AD and negotiating stairs non reciprocally  Denies sleep disturbances or any numbness      Pain  Current pain ratin  At best pain ratin  At worst pain rating: 3  Location: anterior knee  Quality: dull ache and tight  Relieving factors: ice and medications  Aggravating factors: standing, walking and stair climbing    Social Support  Steps to enter house: yes    Employment status: working (graphic design)  Exercise history: weights and elliptical      Diagnostic Tests  MRI studies: abnormal  Treatments  No previous or current treatments  Patient Goals  Patient goals for therapy: decreased pain, increased motion and return to sport/leisure activities          Objective     Observations   Left Knee   Positive for edema and incision  Additional Observation Details  Incisions not inspected secondary to being less than 24 hours post op    Neurological Testing     Sensation     Knee   Left Knee   Intact: light touch    Right Knee   Intact: light touch     Active Range of Motion   Left Knee   Flexion: 70 degrees with pain  Extension: -10 degrees with pain    Right Knee   Flexion: 130 degrees   Extension: 0 degrees     Strength/Myotome Testing     Left Hip   Planes of Motion   Flexion: 4+  Extension: 4  Abduction: 4  Adduction: 4+    Right Hip   Planes of Motion   Flexion: 5  Extension: 4+  Abduction: 4+  Adduction: 5    Left Knee   Flexion: 4-  Extension: 4-  Quadriceps contraction: poor    Right Knee   Flexion: 5  Extension: 5  Quadriceps contraction: good    Ambulation   Weight-Bearing Status   Weight-Bearing Status (Left): weight-bearing as tolerated   Assistive device used: none    Ambulation: Stairs   Ascend stairs: independent  Pattern: non-reciprocal  Railings: one rail  Descend stairs: independent  Pattern: non-reciprocal  Railings: one rail  Curbs: independent    Observational Gait   Gait: antalgic   Decreased walking speed, stride length, left stance time and right step length  Left foot contact pattern: foot flat  Right foot contact pattern: heel to toe  Base of support: decreased    Quality of Movement During Gait     Hip    Hip (Left): Positive hike  Knee    Knee (Left): Positive increased flexion during stance  Functional Assessment      Squat    Unable to perform   Forward Step Up 6"   Left Leg  Pain       Single Leg Stance   Left: 4 seconds  Right: 30 seconds    General Comments:      Knee Comments  Moderate hamstring tightness left greater than right      Flowsheet Rows      Most Recent Value   PT/OT G-Codes   Current Score  62   Projected Score  77   FOTO information reviewed  Yes          Pt was given initial Home Exercise Program today and demonstrates understanding   Fransisco Murry access code: [email or web address]    Precautions left WBAT    Specialty Daily Treatment Diary     Manual  10/18       PROM knee        STM        PFJ mobs        Hamstring stretch                        Exercise Diary         Rec bike        prostretch        Standing hip abduction        Step ups        Knee ext with DF        SLR        Heel slides Hold 10, 1x10        bridges        Quad sets Hold 5, 1x10        Ankle pumps 2x10                       Modalities        CP x8 min left knee

## 2019-10-21 ENCOUNTER — OFFICE VISIT (OUTPATIENT)
Dept: PHYSICAL THERAPY | Facility: CLINIC | Age: 54
End: 2019-10-21
Payer: COMMERCIAL

## 2019-10-21 DIAGNOSIS — S83.232D COMPLEX TEAR OF MEDIAL MENISCUS OF LEFT KNEE AS CURRENT INJURY, SUBSEQUENT ENCOUNTER: Primary | ICD-10-CM

## 2019-10-21 PROCEDURE — 97110 THERAPEUTIC EXERCISES: CPT

## 2019-10-21 PROCEDURE — 97140 MANUAL THERAPY 1/> REGIONS: CPT

## 2019-10-21 NOTE — PROGRESS NOTES
Daily Note     Today's date: 10/21/2019  Patient name: Alexander Posada  : 1965  MRN: [id number]  Referring provider: Billy Guillen MD  Dx:   Encounter Diagnosis   Name Primary?  Complex tear of medial meniscus of left knee as current injury, subsequent encounter Yes                  Subjective: Pt reports minimal pain in knee today  States it was swollen over the weekend  Has been compliant with HEP  Objective: See treatment diary below    Precautions left WBAT    Specialty Daily Treatment Diary     Manual  10/18 10/21      PROM  Left knee flex and ext      STM  Left quad and hamstrings      PFJ mobs  All directions gr II-III      Flexibility                        Exercise Diary         Rec bike  5 min lv 1      prostretch        SL abd  Hold 5, 1x15       Step ups        Hamstring stretch  Hold 30, 1x3 ea      SLR  Hold 5, 1x15       Heel slides Hold 10, 1x10  Hold 10, 1x10       bridges  Hold 5, 1x15       Quad sets Hold 5, 1x10  Hold 5, 1x10       Ankle pumps 2x10       SAQ  Hold 5, 1x15               Modalities        CP x8 min left knee x10 min left knee                  Assessment: Pt tolerated treatment well with minimal complaints of pain  Pain at end range extension  Fatigued at end of session  Reviewed initial HEP with patient and demonstrates independence  Plan: Continue with plan of care to decrease pain, improve mobility, strength, and function  Patient was co-treated by ALYSSA Hong under my direct supervision

## 2019-10-22 ENCOUNTER — CONSULT (OUTPATIENT)
Dept: SURGERY | Facility: CLINIC | Age: 54
End: 2019-10-22
Payer: COMMERCIAL

## 2019-10-22 VITALS
WEIGHT: 254.7 LBS | DIASTOLIC BLOOD PRESSURE: 96 MMHG | HEART RATE: 94 BPM | SYSTOLIC BLOOD PRESSURE: 144 MMHG | HEIGHT: 75 IN | TEMPERATURE: 98.5 F | BODY MASS INDEX: 31.67 KG/M2 | RESPIRATION RATE: 14 BRPM

## 2019-10-22 DIAGNOSIS — K43.2 INCISIONAL HERNIA, WITHOUT OBSTRUCTION OR GANGRENE: ICD-10-CM

## 2019-10-22 PROCEDURE — 99204 OFFICE O/P NEW MOD 45 MIN: CPT | Performed by: SURGERY

## 2019-10-22 NOTE — PROGRESS NOTES
[facility] History and Physical Note:    Assessment:  Incisional hernia  Plan:  CT with p o  Contrast for surgical planning  Follow-up after CT scan    Chief Complaint:  I am here for the hernia    HPI  Patient is a pleasant 51-year-old male who reports a midline abdominal incision 6 years ago for sigmoid colectomy due to complications of recurrent diverticulitis  Since that time, patient has noted a gradual bulge at the incision  Patient has modified his behavior and began working out with an estimated 50 lb weight loss  His diabetes is also under good control  Patient has no bowel or bladder obstructive complaints  No chronic pulmonary complaints  Recent labs include a CBC, CMP and hemoglobin A1c    Labs largely unremarkable and hemoglobin A1c was 5 4    PMH:  Past Medical History:   Diagnosis Date    Anxiety     Colon polyp     Diabetes mellitus ([address] )     Hernia of abdominal wall     Hyperlipidemia        PSH:  Past Surgical History:   Procedure Laterality Date    COLON SURGERY  2013    HEMICOLECTOMY      IN KNEE SCOPE,MED/LAT MENISECTOMY Left 10/17/2019    Procedure: ARTHROSCOPY KNEE MEDIAL MENISCECTOMY;  Surgeon: Bari Earl MD;  Location: [address] [place] [address];  Service: Orthopedics    SKIN CANCER EXCISION      WISDOM TOOTH EXTRACTION         Home Meds:  Current Outpatient Medications on File Prior to Visit   Medication Sig Dispense Refill    allopurinol (ZYLOPRIM) 300 mg tablet       ALPRAZolam (XANAX) 0 25 mg tablet Take 0 25 mg by mouth 4 (four) times a day as needed for anxiety      atorvastatin (LIPITOR) 40 mg tablet TAKE 1 TABLET BY MOUTH EVERY DAY (Patient taking differently: 40 mg daily at bedtime ) 90 tablet 3    esomeprazole (NexIUM) 10 MG packet Take 30 mg by mouth daily at bedtime       Omega-3 Fatty Acids (FISH OIL) 1,000 mg Take 1,000 mg by mouth daily      sitaGLIPtin-metFORMIN (JANUMET)  MG per tablet Take 1 tablet by mouth daily 180 tablet 1    tadalafil (CIALIS) 2 5 MG tablet Take 1 tablet (2 5 mg total) by mouth daily 30 tablet 3    VASCEPA 1 g CAPS       zolpidem (AMBIEN) 10 mg tablet Take 1 tablet (10 mg total) by mouth daily at bedtime as needed for sleep 30 tablet 0     No current facility-administered medications on file prior to visit          Allergies:  No Known Allergies    Social Hx:  Social History     Socioeconomic History    Marital status: /Civil Union     Spouse name: Not on file    Number of children: Not on file    Years of education: Not on file    Highest education level: Not on file   Occupational History    Not on file   Social Needs    Financial resource strain: Not on file    Food insecurity:     Worry: Not on file     Inability: Not on file    Transportation needs:     Medical: Not on file     Non-medical: Not on file   Tobacco Use    Smoking status: Never Smoker    Smokeless tobacco: Never Used   Substance and Sexual Activity    Alcohol use: Not Currently    Drug use: Not on file    Sexual activity: Not on file   Lifestyle    Physical activity:     Days per week: Not on file     Minutes per session: Not on file    Stress: Not on file   Relationships    Social connections:     Talks on phone: Not on file     Gets together: Not on file     Attends Christianity service: Not on file     Active member of club or organization: Not on file     Attends meetings of clubs or organizations: Not on file     Relationship status: Not on file    Intimate partner violence:     Fear of current or ex partner: Not on file     Emotionally abused: Not on file     Physically abused: Not on file     Forced sexual activity: Not on file   Other Topics Concern    Not on file   Social History Narrative    Not on file        Family Hx:    Family History   Problem Relation Age of Onset    Diabetes Mother     Lung cancer Mother     Hyperlipidemia Father     Hypertension Father     Hyperlipidemia Brother     Hypertension Brother          Review of Systems   Constitutional: Negative  HENT: Negative  Eyes: Negative  Respiratory: Negative  Gastrointestinal: Negative  Endocrine:        Diabetes under treatment   Musculoskeletal:        Recent left knee injury and arthroscopy   Skin: Negative  Allergic/Immunologic: Negative  Neurological: Negative  Hematological: Negative  Psychiatric/Behavioral: Negative  There were no vitals taken for this visit  Physical Exam   Constitutional: He is oriented to person, place, and time  He appears well-developed and well-nourished  No distress  HENT:   Head: Normocephalic and atraumatic  Eyes: Pupils are equal, round, and reactive to light  EOM are normal    Neck: Normal range of motion  Neck supple  Cardiovascular: Normal rate and regular rhythm  No murmur heard  Pulmonary/Chest: Effort normal and breath sounds normal  No respiratory distress  Abdominal: Soft  Bowel sounds are normal  He exhibits no distension  Midline incision  Reducible incisional hernia just to the right of midline  Also a small bulge to the left of midline  Musculoskeletal:   Decreased motion left knee due to history   Lymphadenopathy:     He has no cervical adenopathy  Neurological: He is alert and oriented to person, place, and time  Skin: Skin is warm and dry  Psychiatric: He has a normal mood and affect   His behavior is normal        Pertinent labs reviewed  I reviewed his recent labs as well as EKG  Pertinent images and available reads personally reviewed    Pertinent notes reviewed       Walter Castillo MD [id number] [facility]  [phone]

## 2019-10-24 ENCOUNTER — OFFICE VISIT (OUTPATIENT)
Dept: PHYSICAL THERAPY | Facility: CLINIC | Age: 54
End: 2019-10-24
Payer: COMMERCIAL

## 2019-10-24 DIAGNOSIS — S83.232D COMPLEX TEAR OF MEDIAL MENISCUS OF LEFT KNEE AS CURRENT INJURY, SUBSEQUENT ENCOUNTER: Primary | ICD-10-CM

## 2019-10-24 PROCEDURE — 97110 THERAPEUTIC EXERCISES: CPT

## 2019-10-24 PROCEDURE — 97140 MANUAL THERAPY 1/> REGIONS: CPT

## 2019-10-24 PROCEDURE — 97112 NEUROMUSCULAR REEDUCATION: CPT

## 2019-10-24 NOTE — PROGRESS NOTES
Daily Note     Today's date: 10/24/2019  Patient name: Chica King  : 1965  MRN: [id number]  Referring provider: Radha Ogden MD  Dx:   Encounter Diagnosis   Name Primary?  Complex tear of medial meniscus of left knee as current injury, subsequent encounter Yes                  Subjective: Pt reports 2/10 pain in knee today  States that he has lilliana a little sore since yesterday  Objective: See treatment diary below    Precautions left WBAT    Specialty Daily Treatment Diary     Manual  10/18 10/21 10/24     PROM  Left knee flex and ext Left knee flex and ext     STM  Left quad and hamstrings      PFJ mobs  All directions gr II-III All directions gr II-III     Flexibility   Left hamstring     TFJ mobs   For extension gr III             Exercise Diary         Rec bike  5 min lv 1 6 min lv 1     prostretch        SL abd  Hold 5, 1x15  Hold 5, 1x15      Step ups   6 in 2x10     Hamstring stretch  Hold 30, 1x3 ea      SLR  Hold 5, 1x15  Hold 5, 2x10      Heel slides Hold 10, 1x10  Hold 10, 1x10  Hold 5, 2x10       bridges  Hold 5, 1x15        Quad sets Hold 5, 1x10  Hold 5, 1x10  Hold 5, 2x10      Ankle pumps 2x10       SAQ  Hold 5, 1x15       squats   2x10 at bar     Modalities        CP x8 min left knee x10 min left knee Pt deferred                 Assessment: Pt tolerated treatment well with no complaints of pain  Requires cueing to minimize dynamic knee valgus with step ups, able to correct  Pt's HEP was updated on [address] code and demonstrates understanding  Plan: Continue with plan of care to decrease pain, improve mobility, strength, and function

## 2019-10-28 ENCOUNTER — APPOINTMENT (OUTPATIENT)
Dept: PHYSICAL THERAPY | Facility: CLINIC | Age: 54
End: 2019-10-28
Payer: COMMERCIAL

## 2019-10-31 ENCOUNTER — OFFICE VISIT (OUTPATIENT)
Dept: PHYSICAL THERAPY | Facility: CLINIC | Age: 54
End: 2019-10-31
Payer: COMMERCIAL

## 2019-10-31 DIAGNOSIS — S83.232D COMPLEX TEAR OF MEDIAL MENISCUS OF LEFT KNEE AS CURRENT INJURY, SUBSEQUENT ENCOUNTER: Primary | ICD-10-CM

## 2019-10-31 PROCEDURE — 97140 MANUAL THERAPY 1/> REGIONS: CPT

## 2019-10-31 PROCEDURE — 97110 THERAPEUTIC EXERCISES: CPT

## 2019-10-31 PROCEDURE — 97112 NEUROMUSCULAR REEDUCATION: CPT

## 2019-10-31 NOTE — PROGRESS NOTES
Daily Note     Today's date: 10/31/2019  Patient name: Noni Soulier  : 1965  MRN: [id number]  Referring provider: Arya Nicole DO  Dx:   Encounter Diagnosis   Name Primary?  Complex tear of medial meniscus of left knee as current injury, subsequent encounter Yes                  Subjective: Pt reports 0/10 pain today  He reports having slight pain a few days ago as he reports slipping off a curb twisting his knee  The pain is no longer there but he is more aware of his knee now  Objective: See treatment diary below      Precautions left WBAT    Specialty Daily Treatment Diary     Manual  10/18 10/21 10/24 10/31    PROM  Left knee flex and ext Left knee flex and ext Left knee flex and ext    STM  Left quad and hamstrings  Left quad and hamstrings    PFJ mobs  All directions gr II-III All directions gr II-III All directions gr II-III    Flexibility   Left hamstring Left hamstring    TFJ mobs   For extension gr III             Exercise Diary         Rec bike  5 min lv 1 6 min lv 1 6 min lv 1    prostretch    Hold 1, 1x10    SL abd  Hold 5, 1x15  Hold 5, 1x15      Step ups   6 in 2x10 6 in 2x10    Hamstring stretch  Hold 30, 1x3 ea  Hold 30, 1x3     TKE    Dara 3 5, Hold 5, 2x10    SLR  Hold 5, 1x15  Hold 5, 2x10  Hold 5, 2x10    Heel slides Hold 10, 1x10  Hold 10, 1x10  Hold 5, 2x10   Hold 5, 2x10    bridges  Hold 5, 1x15    Hold 5, 1x15    Quad sets Hold 5, 1x10  Hold 5, 1x10  Hold 5, 2x10  Hold 5, 2x10    Ankle pumps 2x10       SAQ  Hold 5, 1x15   Hold 5, 1x15    squats   2x10 at bar 2x10 at bar    Prone hangs        Modalities        CP x8 min left knee x10 min left knee Pt deferred x10 min left knee                  Assessment: Patient was able to tolerate session well with mild discomfort at the anterior knee at end range flexion  Patient was able to achieve 120 degrees knee flexion, but continue to lack -5 degrees knee extension   Increase tightness in hamstrings, released through dynamic stretching and STM  Fatigue at end of session  Plan: Continue with plan of care to decrease pain, improve mobility, strength, and function  Plan to progress HEP next session to focus on achieving full knee extension  Patient was co-treated by ALYSSA Garcia under my direct supervision

## 2019-11-04 ENCOUNTER — OFFICE VISIT (OUTPATIENT)
Dept: PHYSICAL THERAPY | Facility: CLINIC | Age: 54
End: 2019-11-04
Payer: COMMERCIAL

## 2019-11-04 DIAGNOSIS — S83.232D COMPLEX TEAR OF MEDIAL MENISCUS OF LEFT KNEE AS CURRENT INJURY, SUBSEQUENT ENCOUNTER: Primary | ICD-10-CM

## 2019-11-04 PROCEDURE — 97140 MANUAL THERAPY 1/> REGIONS: CPT

## 2019-11-04 PROCEDURE — 97110 THERAPEUTIC EXERCISES: CPT

## 2019-11-04 PROCEDURE — 97112 NEUROMUSCULAR REEDUCATION: CPT

## 2019-11-04 NOTE — PROGRESS NOTES
Daily Note     Today's date: 2019  Patient name: Arturo Shukla  : 1965  MRN: [id number]  Referring provider: Abraham Bumpers, DO  Dx:   Encounter Diagnosis   Name Primary?  Complex tear of medial meniscus of left knee as current injury, subsequent encounter Yes                  Subjective: Pt reports 0/10 pain today  He reports occasional pain by the end of the day due to fatigue  He has been doing more now that his knee has started to feel better         Objective: See treatment diary below    Precautions left WBAT    Specialty Daily Treatment Diary     Manual  10/18 10/21 10/24 10/31 11/04/19   PROM  Left knee flex and ext Left knee flex and ext Left knee flex and ext Left knee flex and ext   STM  Left quad and hamstrings  Left quad and hamstrings Left quad and hamstrings   PFJ mobs  All directions gr II-III All directions gr II-III All directions gr II-III All directions gr II-III   Flexibility   Left hamstring Left hamstring Left hamstring   TFJ mobs   For extension gr III  For extension gr III           Exercise Diary         Rec bike  5 min lv 1 6 min lv 1 6 min lv 1 6 min lv 1   prostretch    Hold 1, 1x10 Hold 10, 1x10   SL abd  Hold 5, 1x15  Hold 5, 1x15      Step ups   6 in 2x10 6 in 2x10 8 in, 2x10   Hamstring stretch  Hold 30, 1x3 ea  Hold 30, 1x3  Hold 30, 1x3   TKE    [place] 3 5, Hold 5, 2x10 [place] 3 5, Hold 5, 2x10   SLR  Hold 5, 1x15  Hold 5, 2x10  Hold 5, 2x10 Hold 5, 2x10   Heel slides Hold 10, 1x10  Hold 10, 1x10  Hold 5, 2x10   Hold 5, 2x10 Hold 5, 2x10   bridges  Hold 5, 1x15    Hold 5, 1x15 Hold 5, 1x15   Quad sets Hold 5, 1x10  Hold 5, 1x10  Hold 5, 2x10  Hold 5, 2x10 Hold 5, 2x10   Ankle pumps 2x10       SAQ  Hold 5, 1x15   Hold 5, 1x15 Hold 5, 2x10   squats   2x10 at bar 2x10 at bar 2x10 at bar   Prone hangs     3 mins   Clamshells         Lateral step down     8 in, 1x15 ea    Modalities        CP x8 min left knee x10 min left knee Pt deferred x10 min left knee X 10 min left knee                   Assessment: Patient was able to tolerate session well with no complaints  Worked on endurance by increasing reps and ex's as patient complaints of fatigue and discomfort at the end of the day  Educated patient on progressed HEP and he reported to be complaint  Additionally, focused more on achieving full knee extension and quadriceps strengthening this session - lacks -4 degrees extension in left knee  Fatigue at the end of the session  Plan: Continue with plan of care to decrease pain, improve mobility, strength, and function  Patient was co-treated by ALYSSA Hayes under my direct supervision

## 2019-11-07 ENCOUNTER — OFFICE VISIT (OUTPATIENT)
Dept: PHYSICAL THERAPY | Facility: CLINIC | Age: 54
End: 2019-11-07
Payer: COMMERCIAL

## 2019-11-07 ENCOUNTER — OFFICE VISIT (OUTPATIENT)
Dept: OBGYN CLINIC | Facility: CLINIC | Age: 54
End: 2019-11-07

## 2019-11-07 VITALS
SYSTOLIC BLOOD PRESSURE: 147 MMHG | DIASTOLIC BLOOD PRESSURE: 82 MMHG | HEIGHT: 75 IN | WEIGHT: 249 LBS | BODY MASS INDEX: 30.96 KG/M2 | HEART RATE: 96 BPM

## 2019-11-07 DIAGNOSIS — Z98.890 S/P LEFT KNEE ARTHROSCOPY: Primary | ICD-10-CM

## 2019-11-07 DIAGNOSIS — S83.232D COMPLEX TEAR OF MEDIAL MENISCUS OF LEFT KNEE AS CURRENT INJURY, SUBSEQUENT ENCOUNTER: Primary | ICD-10-CM

## 2019-11-07 PROCEDURE — 97110 THERAPEUTIC EXERCISES: CPT

## 2019-11-07 PROCEDURE — 97140 MANUAL THERAPY 1/> REGIONS: CPT

## 2019-11-07 PROCEDURE — 97112 NEUROMUSCULAR REEDUCATION: CPT

## 2019-11-07 PROCEDURE — 99024 POSTOP FOLLOW-UP VISIT: CPT | Performed by: ORTHOPAEDIC SURGERY

## 2019-11-07 NOTE — PROGRESS NOTES
Assessment/Plan:  1  S/P left knee arthroscopy       Yousuf Aparicio is a 48year old gentleman presenting 3 weeks after a left knee arthroscopic medial menisectomy with Dr Kristin Mckeon  There is no concern for infection or DVT  He has done well to regain function and comfort in the knee  We advised him against removing his own sutures in the future  He can continue with PT and his HEP and follow up with Dr Kristin Mckeon in 4 weeks if he has any symptoms  He expressed understanding and all of his questions were addressed  Subjective: 3 weeks s/p left knee arthroscopic partial menisectomy with Dr Kristin Mckeon    Patient ID: Johanna Fernandez is a 48 y o  male  Yousuf Aparicio is a pleasant 48year old presenting 3 weeks after undergoing a left knee arthroscopic partial medial menisectomy with Dr Kristin Mckeon  He has been taking his aspirin for DVT prophylaxis and been compliant with physical therapy and his home exercise program  He does admit to removing his own surgeries  He denies any pain or mechanical symptoms and feels much improved since before surgery  He is in our office today as he is planning to catch a flight to [place] and will be unable to keep his regularly scheduled post-op appointment  Review of Systems   Constitutional: Negative  HENT: Negative  Eyes: Negative  Respiratory: Positive for cough  Cardiovascular: Negative  Gastrointestinal: Negative  Endocrine: Negative  Genitourinary: Negative  Skin: Negative  Allergic/Immunologic: Negative  Neurological: Negative  Hematological: Negative  Psychiatric/Behavioral: Negative            Past Medical History:   Diagnosis Date    Anxiety     Colon polyp     Diabetes mellitus ([address] )     Hernia of abdominal wall     Hyperlipidemia        Past Surgical History:   Procedure Laterality Date    COLON SURGERY  2013    HEMICOLECTOMY      DC KNEE SCOPE,MED/LAT MENISECTOMY Left 10/17/2019    Procedure: ARTHROSCOPY KNEE MEDIAL MENISCECTOMY; Surgeon: Madalyn Conti MD;  Location: [facility];  Service: Orthopedics    SKIN CANCER EXCISION      WISDOM TOOTH EXTRACTION         Family History   Problem Relation Age of Onset    Diabetes Mother     Lung cancer Mother     Hyperlipidemia Father     Hypertension Father     Hyperlipidemia Brother     Hypertension Brother        Social History     Occupational History    Not on file   Tobacco Use    Smoking status: Never Smoker    Smokeless tobacco: Never Used   Substance and Sexual Activity    Alcohol use: Not Currently    Drug use: Not on file    Sexual activity: Not on file         Current Outpatient Medications:     allopurinol (ZYLOPRIM) 300 mg tablet, , Disp: , Rfl:     ALPRAZolam (XANAX) 0 25 mg tablet, Take 0 25 mg by mouth 4 (four) times a day as needed for anxiety, Disp: , Rfl:     atorvastatin (LIPITOR) 40 mg tablet, TAKE 1 TABLET BY MOUTH EVERY DAY (Patient taking differently: 40 mg daily at bedtime ), Disp: 90 tablet, Rfl: 3    esomeprazole (NexIUM) 10 MG packet, Take 30 mg by mouth daily at bedtime , Disp: , Rfl:     Omega-3 Fatty Acids (FISH OIL) 1,000 mg, Take 1,000 mg by mouth daily, Disp: , Rfl:     sitaGLIPtin-metFORMIN (JANUMET)  MG per tablet, Take 1 tablet by mouth daily, Disp: 180 tablet, Rfl: 1    tadalafil (CIALIS) 2 5 MG tablet, Take 1 tablet (2 5 mg total) by mouth daily, Disp: 30 tablet, Rfl: 3    VASCEPA 1 g CAPS, , Disp: , Rfl:     zolpidem (AMBIEN) 10 mg tablet, Take 1 tablet (10 mg total) by mouth daily at bedtime as needed for sleep, Disp: 30 tablet, Rfl: 0    No Known Allergies    Objective:  Vitals:    11/07/19 0900   BP: 147/82   Pulse: 96       Body mass index is 31 12 kg/m²  Left Knee Exam     Muscle Strength   The patient has normal left knee strength  Tenderness   The patient is experiencing no tenderness       Range of Motion   Extension:  0 normal   Flexion:  130 normal     Tests   Mg:  Medial - negative Lateral - negative  Varus: negative Valgus: negative  Drawer:  Anterior - negative     Posterior - negative  Patellar apprehension: negative    Other   Erythema: absent  Scars: present  Sensation: normal  Pulse: present  Swelling: none  Effusion: no effusion present    Comments:  Arthroscopic incision portals well healed, sutures have already been removed, no evidence erythema, warmth, drainage, ecchymosis, or other abnormality  ROM free and full without crepitus  Negative PFG  Thigh and calf soft and nontender with no edema  Grossly NVI          Observations   Left Knee   Negative for effusion  Physical Exam   Constitutional: He is oriented to person, place, and time  He appears well-developed and well-nourished  Body mass index is 31 12 kg/m²  HENT:   Head: Normocephalic and atraumatic  Eyes: EOM are normal    Neck: Normal range of motion  Cardiovascular: Intact distal pulses  Pulmonary/Chest: Effort normal    Musculoskeletal:        Left knee: He exhibits no effusion  See ortho exam   Neurological: He is alert and oriented to person, place, and time  Skin: Skin is warm and dry  Psychiatric: He has a normal mood and affect  His behavior is normal  Judgment and thought content normal    Nursing note and vitals reviewed

## 2019-11-07 NOTE — PROGRESS NOTES
Daily Note     Today's date: 2019  Patient name: Marina Lombardi  : 1965  MRN: [id number]  Referring provider: Dennis Rucker DO  Dx:   Encounter Diagnosis   Name Primary?  Complex tear of medial meniscus of left knee as current injury, subsequent encounter Yes                  Subjective: Pt reports 0/10 pain today  He reports going to [place] today for a week and does not have time to go for his follow-up today  He reports taking 1 stitch out at home on his home        Objective: See treatment diary below    Precautions left WBAT    Specialty Daily Treatment Diary     Manual  11/07  10/24 10/31 11/04/19   PROM Left knee flex and ext  Left knee flex and ext Left knee flex and ext Left knee flex and ext   STM    Left quad and hamstrings Left quad and hamstrings   PFJ mobs All directions gr II-III  All directions gr II-III All directions gr II-III All directions gr II-III   Flexibility Left hamstring  Left hamstring Left hamstring Left hamstring   TFJ mobs For extension gr III  For extension gr III  For extension gr III           Exercise Diary         Rec bike 6 min lv 1  6 min lv 1 6 min lv 1 6 min lv 1   prostretch Hold 10, 1x10   Hold 1, 1x10 Hold 10, 1x10   SL abd   Hold 5, 1x15      Step ups 8 in, 2x10  6 in 2x10 6 in 2x10 8 in, 2x10   Hamstring stretch Hold 30, 1x3   Hold 30, 1x3  Hold 30, 1x3   TKE [place] 3 5, Hold 5, 2x10   Dara 3 5, Hold 5, 2x10 Dara 3 5, Hold 5, 2x10   Hip 3-way [place] 1 5, Hold 5, 1x15       SLR Hold 5, 3 lb, 2x10  Hold 5, 2x10  Hold 5, 2x10 Hold 5, 2x10   Knee flexion Standing, 3 lb, 2x10       Heel slides HEP  Hold 5, 2x10   Hold 5, 2x10 Hold 5, 2x10   bridges Red band, Hold 5, 2x10    Hold 5, 1x15 Hold 5, 1x15   Quad sets HEP  Hold 5, 2x10  Hold 5, 2x10 Hold 5, 2x10   Ankle pumps HEP       SAQ    Hold 5, 1x15 Hold 5, 2x10   squats 2x10 at bar  2x10 at bar 2x10 at bar 2x10 at bar   Prone hangs 3#, 3 mins    3 mins   Clamshells         Lateral step down 8in, 1x15 ea 8 in, 1x15 ea    Modalities        CP x10 min left knee  Pt deferred x10 min left knee X 10 min left knee                 Assessment: Patient was educated on risk of infection if taking stitches out on own  Were able to get patient appointment with Dr Luann Hanson to remove stitches after PT today  He was able to tolerate progression of session well with no complaints  Initiated hip strengthening and more weight bearing activities  Required cueing to avoid low back compensation  Fatigue at the end of the session  Plan: Continue with plan of care to decrease pain, improve mobility, strength, and function

## 2019-11-11 ENCOUNTER — APPOINTMENT (OUTPATIENT)
Dept: PHYSICAL THERAPY | Facility: CLINIC | Age: 54
End: 2019-11-11
Payer: COMMERCIAL

## 2019-11-18 ENCOUNTER — OFFICE VISIT (OUTPATIENT)
Dept: PHYSICAL THERAPY | Facility: CLINIC | Age: 54
End: 2019-11-18
Payer: COMMERCIAL

## 2019-11-18 DIAGNOSIS — S83.232D COMPLEX TEAR OF MEDIAL MENISCUS OF LEFT KNEE AS CURRENT INJURY, SUBSEQUENT ENCOUNTER: Primary | ICD-10-CM

## 2019-11-18 PROCEDURE — 97112 NEUROMUSCULAR REEDUCATION: CPT

## 2019-11-18 PROCEDURE — 97140 MANUAL THERAPY 1/> REGIONS: CPT

## 2019-11-18 PROCEDURE — 97110 THERAPEUTIC EXERCISES: CPT

## 2019-11-21 ENCOUNTER — APPOINTMENT (OUTPATIENT)
Dept: PHYSICAL THERAPY | Facility: CLINIC | Age: 54
End: 2019-11-21
Payer: COMMERCIAL

## 2019-11-25 ENCOUNTER — OFFICE VISIT (OUTPATIENT)
Dept: PHYSICAL THERAPY | Facility: CLINIC | Age: 54
End: 2019-11-25
Payer: COMMERCIAL

## 2019-11-25 DIAGNOSIS — S83.232D COMPLEX TEAR OF MEDIAL MENISCUS OF LEFT KNEE AS CURRENT INJURY, SUBSEQUENT ENCOUNTER: Primary | ICD-10-CM

## 2019-11-25 PROCEDURE — 97112 NEUROMUSCULAR REEDUCATION: CPT

## 2019-11-25 PROCEDURE — 97110 THERAPEUTIC EXERCISES: CPT

## 2019-11-25 PROCEDURE — 97140 MANUAL THERAPY 1/> REGIONS: CPT

## 2019-11-25 NOTE — PROGRESS NOTES
PT Discharge    Today's date: 2019  Patient name: Srini Ruiz  : 1965  MRN: [id number]  Referring provider: Akilah Botello DO  Dx:   Encounter Diagnosis     ICD-10-CM    1  Complex tear of medial meniscus of left knee as current injury, subsequent encounter S83 [id number]                   Assessment  Assessment details: Srini Ruiz is a 48 y o  male who has been seen in physical therapy for seven visits secondary to the diagnosis of Complex tear of medial meniscus of left knee as current injury, subsequent encounter  (primary encounter diagnosis) and significant progress and met most of his established goals  The patient demonstrates decrease pain, increase ROM, increase strength, improve activity tolerance and proper mechanics during gait and functional activities  This is evident in his FOTO score improvement from 62% to 99%  As a result he is able to perform his ADLs, return to his gym routine gradually and participate in leisure activities such as family vacations without any difficulty  Patient educated on continuing with comprehensive HEP to continue to make progress towards terminal knee extension  Patient demonstrates understanding to be discharged to HEP         Goals  Short Term Goals (4 weeks)   Pt will be independent in basic Home Exercise Program - MET  Pt will be able to ambulate 20 minutes with pain no more than 2/10 - MET  Pt will be able to ascend 1 flight of stairs reciprocally  - MET  Pt will demonstrate improved gait mechanics with terminal knee extension at heel strike - PARTIALLY MET        Long Term Goals (6-8 weeks)  Pt will be independent in comprehensive Home Exercise Program - MET  Pt will be able to perform ADLs with pain no more than 2/10 - MET  Pt will demonstrate improved SLS balance to at least 30 sec - PARTIALLY MET  Pt will demonstrate improved knee and hip MMT strength to at least 4+/5 - MET  Pt will demonstrate good hip hinge and squatting mechanics - MET    Plan  Plan details: D/C patient to comprehensive HEP  Treatment plan discussed with: patient        Subjective Evaluation    History of Present Illness  Date of surgery: 10/17/2019  Mechanism of injury: surgery  Mechanism of injury: Pt post op left medial menisectomy on 10/17 after pulling his foot from behind his chair and pain continued  Patient now has no difficulty with ambulating long distance, negotiating stairs reciprocally, lifting, and squatting  He was able to ambulate 2 hours at a time when at [facility] and only experienced fatigue which was relieved with ice  Returned to his gym routine with no difficulty  Only experiences pain when twisting his knee left or right during quick motions  Pain  Current pain ratin  At best pain ratin  At worst pain rating: 3  Location: medial aspect of knee  Quality: sharp  Relieving factors: ice and medications    Social Support  Steps to enter house: yes    Employment status: working ([employer])  Exercise history: weights and elliptical      Diagnostic Tests  MRI studies: abnormal  Treatments  No previous or current treatments        Objective     Observations   Left Knee   Negative for edema and incision       Additional Observation Details  Incisions not inspected secondary to being less than 24 hours post op    Neurological Testing     Sensation     Knee   Left Knee   Intact: light touch    Right Knee   Intact: light touch     Active Range of Motion   Left Knee   Flexion: 122 degrees   Extension: -3 degrees     Right Knee   Flexion: 130 degrees   Extension: 0 degrees     Strength/Myotome Testing     Left Hip   Planes of Motion   Flexion: 4+  Extension: 4+  Abduction: 4+  Adduction: 5    Right Hip   Planes of Motion   Flexion: 5  Extension: 4+  Abduction: 4+  Adduction: 5    Left Knee   Flexion: 5  Extension: 5  Quadriceps contraction: good    Right Knee   Flexion: 5  Extension: 5  Quadriceps contraction: good    Ambulation   Weight-Bearing Status   Weight-Bearing Status (Left): full weight bearing   Assistive device used: none    Ambulation: Stairs   Ascend stairs: independent  Pattern: reciprocal  Railings: without rails  Descend stairs: independent  Pattern: reciprocal  Railings: one rail  Curbs: independent    Observational Gait   Stride length, left stance time and right step length within functional limits  Decreased walking speed  Left foot contact pattern: heel to toe  Right foot contact pattern: heel to toe  Base of support: normal    Additional Observational Gait Details  Lacks full terminal extension during heel strike    Quality of Movement During Gait     Hip    Hip (Left): Negative hike  Knee    Knee (Left): Negative increased flexion during stance  Functional Assessment      Squat    Left tibial anterior translation beyond toes  Forward Step Up 8"   Left Leg  Within functional limits  Single Leg Stance   Left: 30 seconds  Right: 30 seconds    General Comments:      Knee Comments  Minimal hamstring tightness left greater than right          Pt was given updated Home Exercise Program today and demonstrates understanding   Bloxy access code: [email or web address]      Precautions left WBAT    Specialty Daily Treatment Diary     Manual  11/07 11/18 11/25 11/04/19   PROM Left knee flex and ext Left knee flex and ext Left knee flex and ext  Left knee flex and ext   STM     Left quad and hamstrings   PFJ mobs All directions gr II-III All directions gr II-III All directions gr II-III  All directions gr II-III   Flexibility Left hamstring Left hamstring Left hamstring  Left hamstring   TFJ mobs For extension gr III For extension gr III For extension gr III  For extension gr III           Exercise Diary         Rec bike 6 min lv 1 6 min lv 1 6 min lv 1  6 min lv 1   prostretch Hold 10, 1x10 Hold 10, 1x10 Hold 10, 1x10  Hold 10, 1x10   SL abd        Step ups 8 in, 2x10 8 in, 2x10 8 in, 2x10  8 in, 2x10   Hamstring stretch Hold 30, 1x3 Hold 30, 1x3   Hold 30, 1x3 TKE [place] 3 5, Hold 5, 2x10 [place] 5 0, Hold 5, 2x10 [place] 5 0, Hold 5, 2x10  [place] 3 5, Hold 5, 2x10   Hip 3-way [place] 1 5, Hold 5, 1x15 [place] 2 0 Hold 5, 1x15 [place] 2 0 Hold 5, 1x15     SLR Hold 5, 3 lb, 2x10 Hold 5, 3 lb, 2x10 Hold 5, 3 lb 2x10  Hold 5, 2x10   Knee flexion Standing, 3 lb, 2x10 Standing, 3 lb, 2x10 Standing 3 lb, 2x10     Heel slides HEP    Hold 5, 2x10   bridges Red band, Hold 5, 2x10 Red band, Hold 5, 2x10 Green band Hold 5, 2x10  Hold 5, 1x15   Quad sets HEP    Hold 5, 2x10   Ankle pumps HEP       SAQ     Hold 5, 2x10   squats 2x10 at bar 2x10 at bar, Hold 5 Ball squats, Hold 5, 2x10  2x10 at bar   Prone hangs 3#, 3 mins 3#, 3 mins 3#, 3 mins  3 mins   Clamshells         Lateral step down 8in, 1x15 ea 8 in, 1x15 ea 8 in, 1x15 ea  8 in, 1x15 ea    Side stepping  Yellow, 20 ft 2x2 Red, 20 ft 2x2     Modalities        CP x10 min left knee x10 min left    X 10 min left knee               Patient was co-treated by ALYSSA Malin under my direct supervision

## 2019-11-29 ENCOUNTER — APPOINTMENT (OUTPATIENT)
Dept: PHYSICAL THERAPY | Facility: CLINIC | Age: 54
End: 2019-11-29
Payer: COMMERCIAL

## 2019-12-02 ENCOUNTER — HOSPITAL ENCOUNTER (OUTPATIENT)
Dept: RADIOLOGY | Facility: HOSPITAL | Age: 54
Discharge: HOME | End: 2019-12-02
Attending: SURGERY
Payer: COMMERCIAL

## 2019-12-02 DIAGNOSIS — K43.2 INCISIONAL HERNIA, WITHOUT OBSTRUCTION OR GANGRENE: ICD-10-CM

## 2019-12-02 PROCEDURE — 74150 CT ABDOMEN W/O CONTRAST: CPT

## 2019-12-05 ENCOUNTER — OFFICE VISIT (OUTPATIENT)
Dept: SURGERY | Facility: CLINIC | Age: 54
End: 2019-12-05
Payer: COMMERCIAL

## 2019-12-05 VITALS
DIASTOLIC BLOOD PRESSURE: 82 MMHG | BODY MASS INDEX: 31.58 KG/M2 | WEIGHT: 254 LBS | SYSTOLIC BLOOD PRESSURE: 122 MMHG | TEMPERATURE: 95.5 F | HEIGHT: 75 IN | HEART RATE: 101 BPM

## 2019-12-05 DIAGNOSIS — K43.2 INCISIONAL HERNIA: Primary | ICD-10-CM

## 2019-12-05 DIAGNOSIS — Z01.818 PREOPERATIVE EXAMINATION: ICD-10-CM

## 2019-12-05 PROCEDURE — 99215 OFFICE O/P EST HI 40 MIN: CPT | Performed by: SURGERY

## 2019-12-05 RX ORDER — CEFAZOLIN SODIUM 2 G/50ML
2000 SOLUTION INTRAVENOUS ONCE
Status: CANCELLED | OUTPATIENT
Start: 2019-12-05 | End: 2019-12-05

## 2019-12-05 NOTE — H&P
[facility] History and Physical Note:    Assessment:  Incisional hernia  Plan:  Transverse abdominis release with retro rectus biologic mesh  Chief Complaint:  I am here for the surgery scheduling"  HPI  Patient is a pleasant 79-year-old male who reports a midline abdominal incision 6 years ago for sigmoid colectomy due to complications of recurrent diverticulitis  Since that time, patient has noted a gradual bulge at the incision  Patient has modified his behavior and began working out with an estimated 50 lb weight loss  His diabetes is also under good control  Patient has no bowel or bladder obstructive complaints  No chronic pulmonary complaints  Recent labs include a CBC, CMP and hemoglobin A1c  Labs largely unremarkable and hemoglobin A1c was 5 4    He was seen previously and a CT scan was ordered for preoperative planning    CT scan reports to hernias, the largest defect is approximately 5 cm  PMH:  Past Medical History:   Diagnosis Date    Anxiety     Colon polyp     Diabetes mellitus ([address] )     Hernia of abdominal wall     Hyperlipidemia        PSH:  Past Surgical History:   Procedure Laterality Date    COLON SURGERY  2013    HEMICOLECTOMY      SC KNEE SCOPE,MED/LAT MENISECTOMY Left 10/17/2019    Procedure: ARTHROSCOPY KNEE MEDIAL MENISCECTOMY;  Surgeon: Madalyn Conti MD;  Location: [address] [place] [address];  Service: Orthopedics    SKIN CANCER EXCISION      WISDOM TOOTH EXTRACTION         Home Meds:  Current Outpatient Medications on File Prior to Visit   Medication Sig Dispense Refill    allopurinol (ZYLOPRIM) 300 mg tablet       ALPRAZolam (XANAX) 0 25 mg tablet Take 0 25 mg by mouth 4 (four) times a day as needed for anxiety      atorvastatin (LIPITOR) 40 mg tablet TAKE 1 TABLET BY MOUTH EVERY DAY (Patient taking differently: 40 mg daily at bedtime ) 90 tablet 3    esomeprazole (NexIUM) 10 MG packet Take 30 mg by mouth daily at bedtime       Omega-3 Fatty Acids (FISH OIL) 1,000 mg Take 1,000 mg by mouth daily      sitaGLIPtin-metFORMIN (JANUMET)  MG per tablet Take 1 tablet by mouth daily 180 tablet 1    tadalafil (CIALIS) 2 5 MG tablet Take 1 tablet (2 5 mg total) by mouth daily 30 tablet 3    VASCEPA 1 g CAPS       zolpidem (AMBIEN) 10 mg tablet Take 1 tablet (10 mg total) by mouth daily at bedtime as needed for sleep 30 tablet 0     No current facility-administered medications on file prior to visit          Allergies:  No Known Allergies    Social Hx:  Social History     Socioeconomic History    Marital status: /Civil Union     Spouse name: Not on file    Number of children: Not on file    Years of education: Not on file    Highest education level: Not on file   Occupational History    Not on file   Social Needs    Financial resource strain: Not on file    Food insecurity:     Worry: Not on file     Inability: Not on file    Transportation needs:     Medical: Not on file     Non-medical: Not on file   Tobacco Use    Smoking status: Never Smoker    Smokeless tobacco: Never Used   Substance and Sexual Activity    Alcohol use: Not Currently    Drug use: Not on file    Sexual activity: Not on file   Lifestyle    Physical activity:     Days per week: Not on file     Minutes per session: Not on file    Stress: Not on file   Relationships    Social connections:     Talks on phone: Not on file     Gets together: Not on file     Attends Religion service: Not on file     Active member of club or organization: Not on file     Attends meetings of clubs or organizations: Not on file     Relationship status: Not on file    Intimate partner violence:     Fear of current or ex partner: Not on file     Emotionally abused: Not on file     Physically abused: Not on file     Forced sexual activity: Not on file   Other Topics Concern    Not on file   Social History Narrative    Not on file        Family Hx:    Family History   Problem Relation Age of Onset    Diabetes Mother     Lung cancer Mother     Hyperlipidemia Father     Hypertension Father     Hyperlipidemia Brother     Hypertension Brother          Review of Systems  Constitutional: Negative  HENT: Negative  Eyes: Negative  Respiratory: Negative  Gastrointestinal: Negative  Endocrine:        Diabetes under treatment   Musculoskeletal:        Recent left knee injury and arthroscopy   Skin: Negative  Allergic/Immunologic: Negative  Neurological: Negative  Hematological: Negative  Psychiatric/Behavioral: Negative  /82   Pulse 101   Temp (!) 95 5 °F (35 3 °C)   Ht 6' 3" (1 905 m)   Wt 115 kg (254 lb)   BMI 31 75 kg/m²      Physical Exam  Constitutional: He is oriented to person, place, and time  He appears well-developed and well-nourished  No distress  HENT:   Head: Normocephalic and atraumatic  Eyes: Pupils are equal, round, and reactive to light  EOM are normal    Neck: Normal range of motion  Neck supple  Cardiovascular: Normal rate and regular rhythm  No murmur heard  Pulmonary/Chest: Effort normal and breath sounds normal  No respiratory distress  Abdominal: Soft  Bowel sounds are normal  He exhibits no distension  Midline incision  Reducible incisional hernia just to the right of midline  Also a small bulge to the left of midline  Musculoskeletal:   Decreased motion left knee due to history   Lymphadenopathy:     He has no cervical adenopathy  Neurological: He is alert and oriented to person, place, and time  Skin: Skin is warm and dry  Psychiatric: He has a normal mood and affect   His behavior is normal         Pertinent labs reviewed  All labs reviewed from October 2019  Pertinent images and available reads personally reviewed  CT scan images as well as report were reviewed  Pertinent notes reviewed       Lj Payne MD [address] [facility]  [phone]

## 2019-12-05 NOTE — H&P (VIEW-ONLY)
[facility] History and Physical Note:    Assessment:  Incisional hernia  Plan:  Transverse abdominis release with retro rectus biologic mesh  Chief Complaint:  I am here for the surgery scheduling"  HPI  Patient is a pleasant 61-year-old male who reports a midline abdominal incision 6 years ago for sigmoid colectomy due to complications of recurrent diverticulitis  Since that time, patient has noted a gradual bulge at the incision  Patient has modified his behavior and began working out with an estimated 50 lb weight loss  His diabetes is also under good control  Patient has no bowel or bladder obstructive complaints  No chronic pulmonary complaints  Recent labs include a CBC, CMP and hemoglobin A1c  Labs largely unremarkable and hemoglobin A1c was 5 4    He was seen previously and a CT scan was ordered for preoperative planning    CT scan reports to hernias, the largest defect is approximately 5 cm  PMH:  Past Medical History:   Diagnosis Date    Anxiety     Colon polyp     Diabetes mellitus ([address] )     Hernia of abdominal wall     Hyperlipidemia        PSH:  Past Surgical History:   Procedure Laterality Date    COLON SURGERY  2013    HEMICOLECTOMY      [place] KNEE SCOPE,MED/LAT MENISECTOMY Left 10/17/2019    Procedure: ARTHROSCOPY KNEE MEDIAL MENISCECTOMY;  Surgeon: Lolita Ivy MD;  Location: [address] [place] [address];  Service: Orthopedics    SKIN CANCER EXCISION      WISDOM TOOTH EXTRACTION         Home Meds:  Current Outpatient Medications on File Prior to Visit   Medication Sig Dispense Refill    allopurinol (ZYLOPRIM) 300 mg tablet       ALPRAZolam (XANAX) 0 25 mg tablet Take 0 25 mg by mouth 4 (four) times a day as needed for anxiety      atorvastatin (LIPITOR) 40 mg tablet TAKE 1 TABLET BY MOUTH EVERY DAY (Patient taking differently: 40 mg daily at bedtime ) 90 tablet 3    esomeprazole (NexIUM) 10 MG packet Take 30 mg by mouth daily at bedtime       Omega-3 Fatty Acids (FISH OIL) 1,000 mg Take 1,000 mg by mouth daily      sitaGLIPtin-metFORMIN (JANUMET)  MG per tablet Take 1 tablet by mouth daily 180 tablet 1    tadalafil (CIALIS) 2 5 MG tablet Take 1 tablet (2 5 mg total) by mouth daily 30 tablet 3    VASCEPA 1 g CAPS       zolpidem (AMBIEN) 10 mg tablet Take 1 tablet (10 mg total) by mouth daily at bedtime as needed for sleep 30 tablet 0     No current facility-administered medications on file prior to visit          Allergies:  No Known Allergies    Social Hx:  Social History     Socioeconomic History    Marital status: /Civil Union     Spouse name: Not on file    Number of children: Not on file    Years of education: Not on file    Highest education level: Not on file   Occupational History    Not on file   Social Needs    Financial resource strain: Not on file    Food insecurity:     Worry: Not on file     Inability: Not on file    Transportation needs:     Medical: Not on file     Non-medical: Not on file   Tobacco Use    Smoking status: Never Smoker    Smokeless tobacco: Never Used   Substance and Sexual Activity    Alcohol use: Not Currently    Drug use: Not on file    Sexual activity: Not on file   Lifestyle    Physical activity:     Days per week: Not on file     Minutes per session: Not on file    Stress: Not on file   Relationships    Social connections:     Talks on phone: Not on file     Gets together: Not on file     Attends [employer] service: Not on file     Active member of club or organization: Not on file     Attends meetings of clubs or organizations: Not on file     Relationship status: Not on file    Intimate partner violence:     Fear of current or ex partner: Not on file     Emotionally abused: Not on file     Physically abused: Not on file     Forced sexual activity: Not on file   Other Topics Concern    Not on file   Social History Narrative    Not on file        Family Hx:    Family History   Problem Relation Age of Onset    Diabetes Mother     Lung cancer Mother     Hyperlipidemia Father     Hypertension Father     Hyperlipidemia Brother     Hypertension Brother          Review of Systems  Constitutional: Negative  HENT: Negative  Eyes: Negative  Respiratory: Negative  Gastrointestinal: Negative  Endocrine:        Diabetes under treatment   Musculoskeletal:        Recent left knee injury and arthroscopy   Skin: Negative  Allergic/Immunologic: Negative  Neurological: Negative  Hematological: Negative  Psychiatric/Behavioral: Negative  /82   Pulse 101   Temp (!) 95 5 °F (35 3 °C)   Ht 6' 3" (1 905 m)   Wt 115 kg (254 lb)   BMI 31 75 kg/m²      Physical Exam  Constitutional: He is oriented to person, place, and time  He appears well-developed and well-nourished  No distress  HENT:   Head: Normocephalic and atraumatic  Eyes: Pupils are equal, round, and reactive to light  EOM are normal    Neck: Normal range of motion  Neck supple  Cardiovascular: Normal rate and regular rhythm  No murmur heard  Pulmonary/Chest: Effort normal and breath sounds normal  No respiratory distress  Abdominal: Soft  Bowel sounds are normal  He exhibits no distension  Midline incision  Reducible incisional hernia just to the right of midline  Also a small bulge to the left of midline  Musculoskeletal:   Decreased motion left knee due to history   Lymphadenopathy:     He has no cervical adenopathy  Neurological: He is alert and oriented to person, place, and time  Skin: Skin is warm and dry  Psychiatric: He has a normal mood and affect   His behavior is normal         Pertinent labs reviewed  All labs reviewed from October 2019  Pertinent images and available reads personally reviewed  CT scan images as well as report were reviewed  Pertinent notes reviewed       Richa Tijerina MD [address] [facility]  [phone]

## 2019-12-05 NOTE — PROGRESS NOTES
[facility] History and Physical Note:    Assessment:  Incisional hernia  Plan:  Transverse abdominis release with retro rectus biologic mesh  Chief Complaint:  I am here for the surgery scheduling"  HPI  Patient is a pleasant 57-year-old male who reports a midline abdominal incision 6 years ago for sigmoid colectomy due to complications of recurrent diverticulitis  Since that time, patient has noted a gradual bulge at the incision  Patient has modified his behavior and began working out with an estimated 50 lb weight loss  His diabetes is also under good control  Patient has no bowel or bladder obstructive complaints  No chronic pulmonary complaints  Recent labs include a CBC, CMP and hemoglobin A1c  Labs largely unremarkable and hemoglobin A1c was 5 4    He was seen previously and a CT scan was ordered for preoperative planning    CT scan reports to hernias, the largest defect is approximately 5 cm  PMH:  Past Medical History:   Diagnosis Date    Anxiety     Colon polyp     Diabetes mellitus ([address] )     Hernia of abdominal wall     Hyperlipidemia        PSH:  Past Surgical History:   Procedure Laterality Date    COLON SURGERY  2013    HEMICOLECTOMY      OK KNEE SCOPE,MED/LAT MENISECTOMY Left 10/17/2019    Procedure: ARTHROSCOPY KNEE MEDIAL MENISCECTOMY;  Surgeon: Edita Fleming MD;  Location: [address] [place] [address];  Service: Orthopedics    SKIN CANCER EXCISION      WISDOM TOOTH EXTRACTION         Home Meds:  Current Outpatient Medications on File Prior to Visit   Medication Sig Dispense Refill    allopurinol (ZYLOPRIM) 300 mg tablet       ALPRAZolam (XANAX) 0 25 mg tablet Take 0 25 mg by mouth 4 (four) times a day as needed for anxiety      atorvastatin (LIPITOR) 40 mg tablet TAKE 1 TABLET BY MOUTH EVERY DAY (Patient taking differently: 40 mg daily at bedtime ) 90 tablet 3    esomeprazole (NexIUM) 10 MG packet Take 30 mg by mouth daily at bedtime       Omega-3 Fatty Acids (FISH OIL) 1,000 mg Take 1,000 mg by mouth daily      sitaGLIPtin-metFORMIN (JANUMET)  MG per tablet Take 1 tablet by mouth daily 180 tablet 1    tadalafil (CIALIS) 2 5 MG tablet Take 1 tablet (2 5 mg total) by mouth daily 30 tablet 3    VASCEPA 1 g CAPS       zolpidem (AMBIEN) 10 mg tablet Take 1 tablet (10 mg total) by mouth daily at bedtime as needed for sleep 30 tablet 0     No current facility-administered medications on file prior to visit          Allergies:  No Known Allergies    Social Hx:  Social History     Socioeconomic History    Marital status: /Civil Union     Spouse name: Not on file    Number of children: Not on file    Years of education: Not on file    Highest education level: Not on file   Occupational History    Not on file   Social Needs    Financial resource strain: Not on file    Food insecurity:     Worry: Not on file     Inability: Not on file    Transportation needs:     Medical: Not on file     Non-medical: Not on file   Tobacco Use    Smoking status: Never Smoker    Smokeless tobacco: Never Used   Substance and Sexual Activity    Alcohol use: Not Currently    Drug use: Not on file    Sexual activity: Not on file   Lifestyle    Physical activity:     Days per week: Not on file     Minutes per session: Not on file    Stress: Not on file   Relationships    Social connections:     Talks on phone: Not on file     Gets together: Not on file     Attends Sabianist service: Not on file     Active member of club or organization: Not on file     Attends meetings of clubs or organizations: Not on file     Relationship status: Not on file    Intimate partner violence:     Fear of current or ex partner: Not on file     Emotionally abused: Not on file     Physically abused: Not on file     Forced sexual activity: Not on file   Other Topics Concern    Not on file   Social History Narrative    Not on file        Family Hx:    Family History   Problem Relation Age of Onset    Diabetes Mother     Lung cancer Mother     Hyperlipidemia Father     Hypertension Father     Hyperlipidemia Brother     Hypertension Brother          Review of Systems  Constitutional: Negative  HENT: Negative  Eyes: Negative  Respiratory: Negative  Gastrointestinal: Negative  Endocrine:        Diabetes under treatment   Musculoskeletal:        Recent left knee injury and arthroscopy   Skin: Negative  Allergic/Immunologic: Negative  Neurological: Negative  Hematological: Negative  Psychiatric/Behavioral: Negative  /82   Pulse 101   Temp (!) 95 5 °F (35 3 °C)   Ht 6' 3" (1 905 m)   Wt 115 kg (254 lb)   BMI 31 75 kg/m²     Physical Exam  Constitutional: He is oriented to person, place, and time  He appears well-developed and well-nourished  No distress  HENT:   Head: Normocephalic and atraumatic  Eyes: Pupils are equal, round, and reactive to light  EOM are normal    Neck: Normal range of motion  Neck supple  Cardiovascular: Normal rate and regular rhythm  No murmur heard  Pulmonary/Chest: Effort normal and breath sounds normal  No respiratory distress  Abdominal: Soft  Bowel sounds are normal  He exhibits no distension  Midline incision  Reducible incisional hernia just to the right of midline  Also a small bulge to the left of midline  Musculoskeletal:   Decreased motion left knee due to history   Lymphadenopathy:     He has no cervical adenopathy  Neurological: He is alert and oriented to person, place, and time  Skin: Skin is warm and dry  Psychiatric: He has a normal mood and affect   His behavior is normal         Pertinent labs reviewed  All labs reviewed from October 2019  Pertinent images and available reads personally reviewed  CT scan images as well as report were reviewed  Pertinent notes reviewed       Flor Benz MD [address] [facility]  [phone]

## 2019-12-20 NOTE — PRE-PROCEDURE INSTRUCTIONS
My Surgical Experience    The following information was developed to assist you to prepare for your operation  What do I need to do before coming to the hospital?   Arrange for a responsible person to drive you to and from the hospital    Arrange care for your children at home  Children are not allowed in the recovery areas of the hospital   Plan to wear clothing that is easy to put on and take off  If you are having shoulder surgery, wear a shirt that buttons or zippers in the front  Bathing  o Shower the evening before and the morning of your surgery with an antibacterial soap  Please refer to the Pre Op Showering Instructions for Surgery Patients Sheet   o Remove nail polish and all body piercing jewelry  o Do not shave any body part for at least 24 hours before surgery-this includes face, arms, legs and upper body  Food  o Nothing to eat or drink after midnight the night before your surgery  This includes candy and chewing gum  o Exception: If your surgery is after 12:00pm (noon), you may have clear liquids such as 7-Up®, ginger ale, apple or cranberry juice, Jell-O®, water, or clear broth until 8:00 am  o Do not drink milk or juice with pulp on the morning before surgery  o Do not drink alcohol 24 hours before surgery  Medicine  o Follow instructions you received from your surgeon about which medicines you may take on the day of surgery  o If instructed to take medicine on the morning of surgery, take pills with just a small sip of water  Call your prescribing doctor for specific infroamtion on what to do if you take insulin    What should I bring to the hospital?    Bring:  Marlyn [place] or a walker, if you have them, for foot or knee surgery   A list of the daily medicines, vitamins, minerals, herbals and nutritional supplements you take   Include the dosages of medicines and the time you take them each day   Glasses, dentures or hearing aids   Minimal clothing; you will be wearing hospital sleepgueroar   Photo ID; required to verify your identity   If you have a Living Will or Power of , bring a copy of the documents   If you have an ostomy, bring an extra pouch and any supplies you use    Do not bring   Medicines or inhalers   Money, valuables or jewelry    What other information should I know about the day of surgery?  Notify your surgeons if you develop a cold, sore throat, cough, fever, rash or any other illness   Report to the Ambulatory Surgical/Same Day Surgery Unit   You will be instructed to stop at Registration only if you have not been pre-registered   Inform your  fi they do not stay that they will be asked by the staff to leave a phone number where they can be reached   Be available to be reached before surgery  In the event the operating room schedule changes, you may be asked to come in earlier or later than expected    *It is important to tell your doctor and others involved in your health care if you are taking or have been taking any non-prescription drugs, vitamins, minerals, herbals or other nutritional supplements  Any of these may interact with some food or medicines and cause a reaction      Pre-Surgery Instructions:   Medication Instructions    allopurinol (ZYLOPRIM) 300 mg tablet Instructed patient per Anesthesia Guidelines   ALPRAZolam (XANAX) 0 25 mg tablet Instructed patient per Anesthesia Guidelines   atorvastatin (LIPITOR) 40 mg tablet Instructed patient per Anesthesia Guidelines   esomeprazole (NexIUM) 10 MG packet Instructed patient per Anesthesia Guidelines   Omega-3 Fatty Acids (FISH OIL) 1,000 mg Instructed patient per Anesthesia Guidelines   sitaGLIPtin-metFORMIN (JANUMET)  MG per tablet Instructed patient per Anesthesia Guidelines   tadalafil (CIALIS) 2 5 MG tablet Instructed patient per Anesthesia Guidelines   VASCEPA 1 g CAPS Instructed patient per Anesthesia Guidelines      zolpidem (AMBIEN) 10 mg tablet Instructed patient per Anesthesia Guidelines

## 2019-12-26 ENCOUNTER — ANESTHESIA EVENT (OUTPATIENT)
Dept: PERIOP | Facility: HOSPITAL | Age: 54
End: 2019-12-26
Payer: COMMERCIAL

## 2019-12-27 ENCOUNTER — ANESTHESIA (OUTPATIENT)
Dept: PERIOP | Facility: HOSPITAL | Age: 54
End: 2019-12-27
Payer: COMMERCIAL

## 2019-12-27 ENCOUNTER — HOSPITAL ENCOUNTER (OUTPATIENT)
Facility: HOSPITAL | Age: 54
Setting detail: OUTPATIENT SURGERY
Discharge: HOME | End: 2019-12-29
Attending: SURGERY | Admitting: SURGERY
Payer: COMMERCIAL

## 2019-12-27 DIAGNOSIS — Z87.19 S/P HERNIA REPAIR: ICD-10-CM

## 2019-12-27 DIAGNOSIS — K81.0 ACUTE EMPHYSEMATOUS CHOLECYSTITIS: Primary | ICD-10-CM

## 2019-12-27 DIAGNOSIS — Z98.890 S/P HERNIA REPAIR: ICD-10-CM

## 2019-12-27 LAB
GLUCOSE SERPL-MCNC: 118 MG/DL (ref 65–140)
GLUCOSE SERPL-MCNC: 126 MG/DL (ref 65–140)
GLUCOSE SERPL-MCNC: 157 MG/DL (ref 65–140)
PLATELET # BLD AUTO: 123 THOUSANDS/UL (ref 149–390)
PMV BLD AUTO: 11 FL (ref 8.9–12.7)

## 2019-12-27 PROCEDURE — C1781 MESH (IMPLANTABLE): HCPCS | Performed by: SURGERY

## 2019-12-27 PROCEDURE — 49568 PR IMPLANT MESH HERNIA REPAIR/DEBRIDEMENT CLOSURE: CPT | Performed by: PHYSICIAN ASSISTANT

## 2019-12-27 PROCEDURE — 49560 PR REPAIR INCISIONAL HERNIA,REDUCIBLE: CPT | Performed by: SURGERY

## 2019-12-27 PROCEDURE — 49560 PR REPAIR INCISIONAL HERNIA,REDUCIBLE: CPT | Performed by: PHYSICIAN ASSISTANT

## 2019-12-27 PROCEDURE — 49568 PR IMPLANT MESH HERNIA REPAIR/DEBRIDEMENT CLOSURE: CPT | Performed by: SURGERY

## 2019-12-27 PROCEDURE — 85049 AUTOMATED PLATELET COUNT: CPT | Performed by: PHYSICIAN ASSISTANT

## 2019-12-27 PROCEDURE — 82948 REAGENT STRIP/BLOOD GLUCOSE: CPT

## 2019-12-27 DEVICE — IMPLANTABLE DEVICE
Type: IMPLANTABLE DEVICE | Site: ABDOMEN | Status: FUNCTIONAL
Brand: XCM BIOLOGIC® TISSUE MATRIX

## 2019-12-27 RX ORDER — ONDANSETRON 2 MG/ML
4 INJECTION INTRAMUSCULAR; INTRAVENOUS ONCE AS NEEDED
Status: DISCONTINUED | OUTPATIENT
Start: 2019-12-27 | End: 2019-12-27 | Stop reason: HOSPADM

## 2019-12-27 RX ORDER — BUPIVACAINE HYDROCHLORIDE AND EPINEPHRINE 2.5; 5 MG/ML; UG/ML
INJECTION, SOLUTION INFILTRATION; PERINEURAL AS NEEDED
Status: DISCONTINUED | OUTPATIENT
Start: 2019-12-27 | End: 2019-12-27 | Stop reason: HOSPADM

## 2019-12-27 RX ORDER — SODIUM CHLORIDE, SODIUM LACTATE, POTASSIUM CHLORIDE, CALCIUM CHLORIDE 600; 310; 30; 20 MG/100ML; MG/100ML; MG/100ML; MG/100ML
50 INJECTION, SOLUTION INTRAVENOUS CONTINUOUS
Status: DISCONTINUED | OUTPATIENT
Start: 2019-12-27 | End: 2019-12-28

## 2019-12-27 RX ORDER — HYDROMORPHONE HCL/PF 1 MG/ML
SYRINGE (ML) INJECTION AS NEEDED
Status: DISCONTINUED | OUTPATIENT
Start: 2019-12-27 | End: 2019-12-27 | Stop reason: SURG

## 2019-12-27 RX ORDER — PROPOFOL 10 MG/ML
INJECTION, EMULSION INTRAVENOUS AS NEEDED
Status: DISCONTINUED | OUTPATIENT
Start: 2019-12-27 | End: 2019-12-27 | Stop reason: SURG

## 2019-12-27 RX ORDER — CEFAZOLIN SODIUM 2 G/50ML
2000 SOLUTION INTRAVENOUS ONCE
Status: COMPLETED | OUTPATIENT
Start: 2019-12-27 | End: 2019-12-27

## 2019-12-27 RX ORDER — OXYCODONE HYDROCHLORIDE 10 MG/1
10 TABLET ORAL EVERY 4 HOURS PRN
Status: DISCONTINUED | OUTPATIENT
Start: 2019-12-27 | End: 2019-12-29 | Stop reason: HOSPADM

## 2019-12-27 RX ORDER — SENNOSIDES 8.6 MG
1 TABLET ORAL DAILY
Status: DISCONTINUED | OUTPATIENT
Start: 2019-12-27 | End: 2019-12-29 | Stop reason: HOSPADM

## 2019-12-27 RX ORDER — ROCURONIUM BROMIDE 10 MG/ML
INJECTION, SOLUTION INTRAVENOUS AS NEEDED
Status: DISCONTINUED | OUTPATIENT
Start: 2019-12-27 | End: 2019-12-27 | Stop reason: SURG

## 2019-12-27 RX ORDER — ONDANSETRON 2 MG/ML
4 INJECTION INTRAMUSCULAR; INTRAVENOUS EVERY 6 HOURS PRN
Status: DISCONTINUED | OUTPATIENT
Start: 2019-12-27 | End: 2019-12-29 | Stop reason: HOSPADM

## 2019-12-27 RX ORDER — FENTANYL CITRATE/PF 50 MCG/ML
25 SYRINGE (ML) INJECTION
Status: COMPLETED | OUTPATIENT
Start: 2019-12-27 | End: 2019-12-27

## 2019-12-27 RX ORDER — ZOLPIDEM TARTRATE 5 MG/1
10 TABLET ORAL
Status: DISCONTINUED | OUTPATIENT
Start: 2019-12-27 | End: 2019-12-29 | Stop reason: HOSPADM

## 2019-12-27 RX ORDER — KETOROLAC TROMETHAMINE 30 MG/ML
INJECTION, SOLUTION INTRAMUSCULAR; INTRAVENOUS AS NEEDED
Status: DISCONTINUED | OUTPATIENT
Start: 2019-12-27 | End: 2019-12-27 | Stop reason: SURG

## 2019-12-27 RX ORDER — PANTOPRAZOLE SODIUM 20 MG/1
20 TABLET, DELAYED RELEASE ORAL
Status: DISCONTINUED | OUTPATIENT
Start: 2019-12-27 | End: 2019-12-29 | Stop reason: HOSPADM

## 2019-12-27 RX ORDER — LIDOCAINE HYDROCHLORIDE 10 MG/ML
INJECTION, SOLUTION EPIDURAL; INFILTRATION; INTRACAUDAL; PERINEURAL AS NEEDED
Status: DISCONTINUED | OUTPATIENT
Start: 2019-12-27 | End: 2019-12-27 | Stop reason: SURG

## 2019-12-27 RX ORDER — CALCIUM CARBONATE 200(500)MG
1000 TABLET,CHEWABLE ORAL DAILY PRN
Status: DISCONTINUED | OUTPATIENT
Start: 2019-12-27 | End: 2019-12-29 | Stop reason: HOSPADM

## 2019-12-27 RX ORDER — HYDROMORPHONE HCL/PF 1 MG/ML
0.2 SYRINGE (ML) INJECTION
Status: DISCONTINUED | OUTPATIENT
Start: 2019-12-27 | End: 2019-12-28

## 2019-12-27 RX ORDER — GLYCOPYRROLATE 0.2 MG/ML
INJECTION INTRAMUSCULAR; INTRAVENOUS AS NEEDED
Status: DISCONTINUED | OUTPATIENT
Start: 2019-12-27 | End: 2019-12-27 | Stop reason: SURG

## 2019-12-27 RX ORDER — MIDAZOLAM HYDROCHLORIDE 2 MG/2ML
INJECTION, SOLUTION INTRAMUSCULAR; INTRAVENOUS AS NEEDED
Status: DISCONTINUED | OUTPATIENT
Start: 2019-12-27 | End: 2019-12-27 | Stop reason: SURG

## 2019-12-27 RX ORDER — OXYCODONE HYDROCHLORIDE AND ACETAMINOPHEN 5; 325 MG/1; MG/1
1 TABLET ORAL EVERY 4 HOURS PRN
Status: DISCONTINUED | OUTPATIENT
Start: 2019-12-27 | End: 2019-12-28

## 2019-12-27 RX ORDER — DEXAMETHASONE SODIUM PHOSPHATE 4 MG/ML
INJECTION, SOLUTION INTRA-ARTICULAR; INTRALESIONAL; INTRAMUSCULAR; INTRAVENOUS; SOFT TISSUE AS NEEDED
Status: DISCONTINUED | OUTPATIENT
Start: 2019-12-27 | End: 2019-12-27 | Stop reason: SURG

## 2019-12-27 RX ORDER — MAGNESIUM HYDROXIDE 1200 MG/15ML
LIQUID ORAL AS NEEDED
Status: DISCONTINUED | OUTPATIENT
Start: 2019-12-27 | End: 2019-12-27 | Stop reason: HOSPADM

## 2019-12-27 RX ORDER — NEOSTIGMINE METHYLSULFATE 1 MG/ML
INJECTION INTRAVENOUS AS NEEDED
Status: DISCONTINUED | OUTPATIENT
Start: 2019-12-27 | End: 2019-12-27 | Stop reason: SURG

## 2019-12-27 RX ORDER — ALPRAZOLAM 0.25 MG/1
0.25 TABLET ORAL 4 TIMES DAILY PRN
Status: DISCONTINUED | OUTPATIENT
Start: 2019-12-27 | End: 2019-12-28

## 2019-12-27 RX ORDER — CEFAZOLIN SODIUM 2 G/50ML
2000 SOLUTION INTRAVENOUS EVERY 8 HOURS
Status: COMPLETED | OUTPATIENT
Start: 2019-12-27 | End: 2019-12-28

## 2019-12-27 RX ORDER — SODIUM CHLORIDE, SODIUM LACTATE, POTASSIUM CHLORIDE, CALCIUM CHLORIDE 600; 310; 30; 20 MG/100ML; MG/100ML; MG/100ML; MG/100ML
INJECTION, SOLUTION INTRAVENOUS CONTINUOUS PRN
Status: DISCONTINUED | OUTPATIENT
Start: 2019-12-27 | End: 2019-12-27 | Stop reason: SURG

## 2019-12-27 RX ORDER — ACETAMINOPHEN 325 MG/1
650 TABLET ORAL EVERY 6 HOURS PRN
Status: DISCONTINUED | OUTPATIENT
Start: 2019-12-27 | End: 2019-12-28

## 2019-12-27 RX ORDER — FENTANYL CITRATE 50 UG/ML
INJECTION, SOLUTION INTRAMUSCULAR; INTRAVENOUS AS NEEDED
Status: DISCONTINUED | OUTPATIENT
Start: 2019-12-27 | End: 2019-12-27 | Stop reason: SURG

## 2019-12-27 RX ORDER — ONDANSETRON 2 MG/ML
INJECTION INTRAMUSCULAR; INTRAVENOUS AS NEEDED
Status: DISCONTINUED | OUTPATIENT
Start: 2019-12-27 | End: 2019-12-27 | Stop reason: SURG

## 2019-12-27 RX ORDER — KETOROLAC TROMETHAMINE 30 MG/ML
30 INJECTION, SOLUTION INTRAMUSCULAR; INTRAVENOUS EVERY 6 HOURS SCHEDULED
Status: DISCONTINUED | OUTPATIENT
Start: 2019-12-27 | End: 2019-12-29 | Stop reason: HOSPADM

## 2019-12-27 RX ORDER — SODIUM CHLORIDE, SODIUM LACTATE, POTASSIUM CHLORIDE, CALCIUM CHLORIDE 600; 310; 30; 20 MG/100ML; MG/100ML; MG/100ML; MG/100ML
125 INJECTION, SOLUTION INTRAVENOUS CONTINUOUS
Status: DISCONTINUED | OUTPATIENT
Start: 2019-12-27 | End: 2019-12-27 | Stop reason: ALTCHOICE

## 2019-12-27 RX ADMIN — PHENYLEPHRINE HYDROCHLORIDE 100 MCG: 10 INJECTION INTRAVENOUS at 12:21

## 2019-12-27 RX ADMIN — PHENYLEPHRINE HYDROCHLORIDE 100 MCG: 10 INJECTION INTRAVENOUS at 13:50

## 2019-12-27 RX ADMIN — SODIUM CHLORIDE, SODIUM LACTATE, POTASSIUM CHLORIDE, AND CALCIUM CHLORIDE: .6; .31; .03; .02 INJECTION, SOLUTION INTRAVENOUS at 11:31

## 2019-12-27 RX ADMIN — CEFAZOLIN SODIUM 2000 MG: 2 SOLUTION INTRAVENOUS at 21:26

## 2019-12-27 RX ADMIN — SODIUM CHLORIDE, SODIUM LACTATE, POTASSIUM CHLORIDE, AND CALCIUM CHLORIDE: .6; .31; .03; .02 INJECTION, SOLUTION INTRAVENOUS at 14:04

## 2019-12-27 RX ADMIN — NEOSTIGMINE METHYLSULFATE 4 MG: 1 INJECTION INTRAVENOUS at 14:04

## 2019-12-27 RX ADMIN — HYDROMORPHONE HYDROCHLORIDE 1 MG: 1 INJECTION, SOLUTION INTRAMUSCULAR; INTRAVENOUS; SUBCUTANEOUS at 12:41

## 2019-12-27 RX ADMIN — DEXAMETHASONE SODIUM PHOSPHATE 4 MG: 4 INJECTION, SOLUTION INTRA-ARTICULAR; INTRALESIONAL; INTRAMUSCULAR; INTRAVENOUS; SOFT TISSUE at 12:34

## 2019-12-27 RX ADMIN — FENTANYL CITRATE 25 MCG: 50 INJECTION, SOLUTION INTRAMUSCULAR; INTRAVENOUS at 15:19

## 2019-12-27 RX ADMIN — FENTANYL CITRATE 100 MCG: 50 INJECTION, SOLUTION INTRAMUSCULAR; INTRAVENOUS at 12:11

## 2019-12-27 RX ADMIN — LIDOCAINE HYDROCHLORIDE 50 MG: 10 INJECTION, SOLUTION EPIDURAL; INFILTRATION; INTRACAUDAL; PERINEURAL at 12:11

## 2019-12-27 RX ADMIN — FENTANYL CITRATE 25 MCG: 50 INJECTION, SOLUTION INTRAMUSCULAR; INTRAVENOUS at 15:05

## 2019-12-27 RX ADMIN — PHENYLEPHRINE HYDROCHLORIDE 50 MCG: 10 INJECTION INTRAVENOUS at 13:52

## 2019-12-27 RX ADMIN — CEFAZOLIN SODIUM 2000 MG: 2 SOLUTION INTRAVENOUS at 12:08

## 2019-12-27 RX ADMIN — SODIUM CHLORIDE, SODIUM LACTATE, POTASSIUM CHLORIDE, AND CALCIUM CHLORIDE 50 ML/HR: .6; .31; .03; .02 INJECTION, SOLUTION INTRAVENOUS at 17:37

## 2019-12-27 RX ADMIN — PHENYLEPHRINE HYDROCHLORIDE 100 MCG: 10 INJECTION INTRAVENOUS at 13:45

## 2019-12-27 RX ADMIN — FENTANYL CITRATE 25 MCG: 50 INJECTION, SOLUTION INTRAMUSCULAR; INTRAVENOUS at 15:24

## 2019-12-27 RX ADMIN — SENNOSIDES 8.6 MG: 8.6 TABLET, FILM COATED ORAL at 17:29

## 2019-12-27 RX ADMIN — OXYCODONE HYDROCHLORIDE 10 MG: 10 TABLET ORAL at 21:39

## 2019-12-27 RX ADMIN — GLYCOPYRROLATE 0.6 MG: 0.2 INJECTION, SOLUTION INTRAMUSCULAR; INTRAVENOUS at 14:04

## 2019-12-27 RX ADMIN — MIDAZOLAM HYDROCHLORIDE 1 MG: 1 INJECTION, SOLUTION INTRAMUSCULAR; INTRAVENOUS at 12:03

## 2019-12-27 RX ADMIN — ONDANSETRON 4 MG: 2 INJECTION INTRAMUSCULAR; INTRAVENOUS at 12:22

## 2019-12-27 RX ADMIN — FENTANYL CITRATE 25 MCG: 50 INJECTION, SOLUTION INTRAMUSCULAR; INTRAVENOUS at 14:57

## 2019-12-27 RX ADMIN — PHENYLEPHRINE HYDROCHLORIDE 50 MCG: 10 INJECTION INTRAVENOUS at 13:43

## 2019-12-27 RX ADMIN — KETOROLAC TROMETHAMINE 30 MG: 30 INJECTION, SOLUTION INTRAMUSCULAR at 12:22

## 2019-12-27 RX ADMIN — PHENYLEPHRINE HYDROCHLORIDE 100 MCG: 10 INJECTION INTRAVENOUS at 12:57

## 2019-12-27 RX ADMIN — PANTOPRAZOLE SODIUM 20 MG: 20 TABLET, DELAYED RELEASE ORAL at 21:25

## 2019-12-27 RX ADMIN — PHENYLEPHRINE HYDROCHLORIDE 100 MCG: 10 INJECTION INTRAVENOUS at 13:25

## 2019-12-27 RX ADMIN — HYDROMORPHONE HYDROCHLORIDE 0.2 MG: 1 INJECTION, SOLUTION INTRAMUSCULAR; INTRAVENOUS; SUBCUTANEOUS at 22:47

## 2019-12-27 RX ADMIN — KETOROLAC TROMETHAMINE 30 MG: 30 INJECTION, SOLUTION INTRAMUSCULAR at 17:30

## 2019-12-27 RX ADMIN — PROPOFOL 200 MG: 10 INJECTION, EMULSION INTRAVENOUS at 12:11

## 2019-12-27 RX ADMIN — ROCURONIUM BROMIDE 50 MG: 10 INJECTION, SOLUTION INTRAVENOUS at 12:12

## 2019-12-27 NOTE — ANESTHESIA POSTPROCEDURE EVALUATION
Post-Op Assessment Note    CV Status:  Stable  Pain Score: 0    Pain management: adequate     Mental Status:  Sleepy   Hydration Status:  Stable   PONV Controlled:  Controlled   Airway Patency:  Patent and adequate   Post Op Vitals Reviewed: Yes      Staff: CRNA           BP      Temp      Pulse     Resp      SpO2

## 2019-12-27 NOTE — PLAN OF CARE
Problem: PAIN - ADULT  Goal: Verbalizes/displays adequate comfort level or baseline comfort level  Description  Interventions:  - Encourage patient to monitor pain and request assistance  - Assess pain using appropriate pain scale  - Administer analgesics based on type and severity of pain and evaluate response  - Implement non-pharmacological measures as appropriate and evaluate response  - Consider cultural and social influences on pain and pain management  - Notify physician/advanced practitioner if interventions unsuccessful or patient reports new pain  Outcome: Progressing     Problem: INFECTION - ADULT  Goal: Absence or prevention of progression during hospitalization  Description  INTERVENTIONS:  - Assess and monitor for signs and symptoms of infection  - Monitor lab/diagnostic results  - Monitor all insertion sites, i e  indwelling lines, tubes, and drains  - [place] appropriate cooling/warming therapies per order  - Administer medications as ordered  - Instruct and encourage patient and family to use good hand hygiene technique  - Identify and instruct in appropriate isolation precautions for identified infection/condition   Outcome: Progressing     Problem: SAFETY ADULT  Goal: Patient will remain free of falls  Description  INTERVENTIONS:  - Assess patient frequently for physical needs  -  Identify cognitive and physical deficits and behaviors that affect risk of falls    -  [place] fall precautions as indicated by assessment   - Educate patient/family on patient safety including physical limitations  - Instruct patient to call for assistance with activity based on assessment  - Modify environment to reduce risk of injury  - Consider OT/PT consult to assist with strengthening/mobility  Outcome: Progressing  Goal: Maintain or return to baseline ADL function  Description  INTERVENTIONS:  -  Assess patient's ability to carry out ADLs; assess patient's baseline for ADL function and identify physical deficits which impact ability to perform ADLs (bathing, care of mouth/teeth, toileting, grooming, dressing, etc )  - Assess/evaluate cause of self-care deficits   - Assess range of motion  - Assess patient's mobility; develop plan if impaired  - Assess patient's need for assistive devices and provide as appropriate  - Encourage maximum independence but intervene and supervise when necessary  - Involve family in performance of ADLs  - Assess for home care needs following discharge   - Consider OT consult to assist with ADL evaluation and planning for discharge  - Provide patient education as appropriate  Outcome: Progressing  Goal: Maintain or return mobility status to optimal level  Description  INTERVENTIONS:  - Assess patient's baseline mobility status (ambulation, transfers, stairs, etc )    - Identify cognitive and physical deficits and behaviors that affect mobility  - Identify mobility aids required to assist with transfers and/or ambulation (gait belt, sit-to-stand, lift, walker, cane, etc )  - [place] fall precautions as indicated by assessment  - Record patient progress and toleration of activity level on Mobility SBAR; progress patient to next Phase/Stage  - Instruct patient to call for assistance with activity based on assessment  - Consider rehabilitation consult to assist with strengthening/weightbearing, etc   Outcome: Progressing     Problem: DISCHARGE PLANNING  Goal: Discharge to home or other facility with appropriate resources  Description  INTERVENTIONS:  - Identify barriers to discharge w/patient and caregiver  - Arrange for needed discharge resources and transportation as appropriate  - Identify discharge learning needs (meds, wound care, etc )  - Arrange for interpretive services to assist at discharge as needed  - Refer to Case Management Department for coordinating discharge planning if the patient needs post-hospital services based on physician/advanced practitioner order or complex needs related to functional status, cognitive ability, or social support system  Outcome: Progressing     Problem: Knowledge Deficit  Goal: Patient/family/caregiver demonstrates understanding of disease process, treatment plan, medications, and discharge instructions  Description  Complete learning assessment and assess knowledge base    Interventions:  - Provide teaching at level of understanding  - Provide teaching via preferred learning methods  Outcome: Progressing

## 2019-12-27 NOTE — OP NOTE
OPERATIVE REPORT  PATIENT NAME: Edu Juares    :  1965  MRN: [id number]  Pt Location: [facility] OR ROOM 01    SURGERY DATE: 2019    Surgeon(s) and Role:     * Kennedi Helton MD - Primary     * Peyman Jara PA-C - Assisting    Preop Diagnosis:  Incisional hernia [K43 2]    Post-Op Diagnosis Codes:     * Incisional hernia [K43 2]    Procedure(s) (LRB):  REPAIR HERNIA INCISIONAL (N/A)    Specimen(s):  * No specimens in log *    Estimated Blood Loss:   50 mL    Drains:  Closed/Suction Drain Midline Abdomen Bulb (Active)   Number of days: 0       Closed/Suction Drain RLQ Bulb (Active)   Number of days: 0       Anesthesia Type:   General    Operative Indications:  Incisional hernia [K43 2]      Operative Findings:  Two upper midline incisional hernias    Complications:   None    Procedure and Technique:  1  Incisional hernia repair  2  Implantation of mesh  Patient was taken back to main operating room, placed supine on the operating table, general anesthesia was induced, the abdomen was prepped and draped in normal fashion  The upper midline scar was excised  Soft tissue was divided with Bovie cautery, 2 incisional hernias were noted in the upper midline  The fascial defect of 1 was 5 cm in greatest dimension  The fascial defect of the other hernia was 2 cm in greatest dimension  The hernia sacs were dissected free from subcutaneous tissue and the contents of each sac was omentum  The omentum was returned to its intra-abdominal location  The posterior rectus sheath was dissected free from the rectus muscles on both sides of the abdomen  The posterior rectus sheath was then reapproximated with a running #1 Prolene suture  The retro rectus space was copiously irrigated  A  10 cm x  16 cm XCM biologic tissue matrix was placed in the retro rectus space and tacked in place with 0 Prolene suture  The anterior rectus sheath was then approximated with figure 8 #1 Prolene interrupted sutures  There was a 10 mm ANNA drain placed in the retro rectus space  Another 10 mm ANNA drain was placed in the subcutaneous tissue below the midline incision  Subcutaneous tissue was approximately 3 0 Vicryl as well as the dermis  Skin was approximated with a staple device  A sterile dressing was placed  Patient awoke from general anesthesia, was extubated in the operating room, sent to recovery room stable condition  I was present for the entire procedure and A qualified resident physician was not available      TRAVON Maicas was required for technical assistance and retraction    Patient Disposition:  PACU     SIGNATURE: Heather White MD  DATE: December 27, 2019  TIME: 2:14 PM

## 2019-12-27 NOTE — ANESTHESIA PREPROCEDURE EVALUATION
Review of Systems/Medical History  Patient summary reviewed  Chart reviewed  No history of anesthetic complications     Cardiovascular  Hyperlipidemia,    Pulmonary       GI/Hepatic    GERD well controlled,   Comment: abd wall hernia          Endo/Other  No diabetes ,   Obesity    GYN       Hematology   Musculoskeletal    Arthritis     Neurology   Psychology   Anxiety,              Physical Exam    Airway    Mallampati score: II  TM Distance: >3 FB  Neck ROM: full     Dental       Cardiovascular  Rhythm: regular, Rate: normal,     Pulmonary  Breath sounds clear to auscultation,     Other Findings        Anesthesia Plan  ASA Score- 2     Anesthesia Type- general with ASA Monitors  Additional Monitors:   Airway Plan: ETT  Plan Factors-    Induction- intravenous  Postoperative Plan- Plan for postoperative opioid use  Planned trial extubation    Informed Consent- Anesthetic plan and risks discussed with patient  I personally reviewed this patient with the CRNA  Discussed and agreed on the Anesthesia Plan with the CRNA  Cherie Jones

## 2019-12-28 PROBLEM — K81.0 ACUTE EMPHYSEMATOUS CHOLECYSTITIS: Status: ACTIVE | Noted: 2019-12-05

## 2019-12-28 LAB
GLUCOSE SERPL-MCNC: 102 MG/DL (ref 65–140)
GLUCOSE SERPL-MCNC: 107 MG/DL (ref 65–140)
GLUCOSE SERPL-MCNC: 111 MG/DL (ref 65–140)
GLUCOSE SERPL-MCNC: 131 MG/DL (ref 65–140)

## 2019-12-28 PROCEDURE — 99024 POSTOP FOLLOW-UP VISIT: CPT | Performed by: SURGERY

## 2019-12-28 PROCEDURE — 82948 REAGENT STRIP/BLOOD GLUCOSE: CPT

## 2019-12-28 RX ORDER — ACETAMINOPHEN 325 MG/1
650 TABLET ORAL EVERY 6 HOURS SCHEDULED
Status: DISCONTINUED | OUTPATIENT
Start: 2019-12-28 | End: 2019-12-29 | Stop reason: HOSPADM

## 2019-12-28 RX ORDER — ACETAMINOPHEN 325 MG/1
650 TABLET ORAL EVERY 6 HOURS SCHEDULED
Status: DISCONTINUED | OUTPATIENT
Start: 2019-12-28 | End: 2019-12-28

## 2019-12-28 RX ORDER — OXYCODONE HYDROCHLORIDE 5 MG/1
5 TABLET ORAL EVERY 4 HOURS PRN
Status: DISCONTINUED | OUTPATIENT
Start: 2019-12-28 | End: 2019-12-29 | Stop reason: HOSPADM

## 2019-12-28 RX ORDER — ALPRAZOLAM 0.5 MG/1
0.5 TABLET ORAL 4 TIMES DAILY PRN
Status: DISCONTINUED | OUTPATIENT
Start: 2019-12-28 | End: 2019-12-29 | Stop reason: HOSPADM

## 2019-12-28 RX ORDER — HYDROMORPHONE HCL/PF 1 MG/ML
0.5 SYRINGE (ML) INJECTION
Status: DISCONTINUED | OUTPATIENT
Start: 2019-12-28 | End: 2019-12-29 | Stop reason: HOSPADM

## 2019-12-28 RX ADMIN — ENOXAPARIN SODIUM 40 MG: 40 INJECTION SUBCUTANEOUS at 08:54

## 2019-12-28 RX ADMIN — CEFAZOLIN SODIUM 2000 MG: 2 SOLUTION INTRAVENOUS at 04:07

## 2019-12-28 RX ADMIN — OXYCODONE HYDROCHLORIDE 10 MG: 10 TABLET ORAL at 19:59

## 2019-12-28 RX ADMIN — ZOLPIDEM TARTRATE 10 MG: 5 TABLET, COATED ORAL at 00:09

## 2019-12-28 RX ADMIN — ALPRAZOLAM 0.25 MG: 0.25 TABLET ORAL at 03:06

## 2019-12-28 RX ADMIN — KETOROLAC TROMETHAMINE 30 MG: 30 INJECTION, SOLUTION INTRAMUSCULAR at 12:21

## 2019-12-28 RX ADMIN — OXYCODONE HYDROCHLORIDE 10 MG: 10 TABLET ORAL at 04:14

## 2019-12-28 RX ADMIN — SENNOSIDES 8.6 MG: 8.6 TABLET, FILM COATED ORAL at 08:54

## 2019-12-28 RX ADMIN — KETOROLAC TROMETHAMINE 30 MG: 30 INJECTION, SOLUTION INTRAMUSCULAR at 17:22

## 2019-12-28 RX ADMIN — OXYCODONE HYDROCHLORIDE 10 MG: 10 TABLET ORAL at 15:56

## 2019-12-28 RX ADMIN — PANTOPRAZOLE SODIUM 20 MG: 20 TABLET, DELAYED RELEASE ORAL at 21:52

## 2019-12-28 RX ADMIN — OXYCODONE HYDROCHLORIDE 10 MG: 10 TABLET ORAL at 08:54

## 2019-12-28 RX ADMIN — ACETAMINOPHEN 650 MG: 325 TABLET, FILM COATED ORAL at 21:52

## 2019-12-28 RX ADMIN — CEFAZOLIN SODIUM 2000 MG: 2 SOLUTION INTRAVENOUS at 12:20

## 2019-12-28 RX ADMIN — HYDROMORPHONE HYDROCHLORIDE 0.5 MG: 1 INJECTION, SOLUTION INTRAMUSCULAR; INTRAVENOUS; SUBCUTANEOUS at 23:21

## 2019-12-28 RX ADMIN — HYDROMORPHONE HYDROCHLORIDE 0.2 MG: 1 INJECTION, SOLUTION INTRAMUSCULAR; INTRAVENOUS; SUBCUTANEOUS at 05:24

## 2019-12-28 RX ADMIN — ALPRAZOLAM 0.5 MG: 0.5 TABLET ORAL at 21:52

## 2019-12-28 RX ADMIN — KETOROLAC TROMETHAMINE 30 MG: 30 INJECTION, SOLUTION INTRAMUSCULAR at 00:09

## 2019-12-28 RX ADMIN — HYDROMORPHONE HYDROCHLORIDE 0.5 MG: 1 INJECTION, SOLUTION INTRAMUSCULAR; INTRAVENOUS; SUBCUTANEOUS at 11:06

## 2019-12-28 NOTE — PROGRESS NOTES
Progress Note - General Surgery   Tunde Boles 48 y o  male MRN: [id number]  Unit/Bed#: [address] [place] [address] Encounter: [id number]    Assessment:  · POD#1 large incisional hernia repair with biologic mesh placement in retrorectus space      Plan:  · Continue ANNA drains  · Continue PRN analgesics  · Complete 24 hrs postop Ancef  · Scheduled toradol 30mg Q6 and tylenol 650mg Q6  · lovenox and SCDs for DVT ppx  · Sliding scale insulin for blood glucose control  · Diabetic diet  · AM labs:  BMP  · Likely discharge tomorrow      Subjective/Objective     Subjective:  Patient states he has had some difficulty sleeping and wishes his pain would be alittle bit more under control  He is using his incentive spirometer  He plans to ambulate in the hallways today  He is urinating appropriately and feels as if he needs to pass flatus  Objective:  95cc sanguinous output in two ANNA drains    Blood pressure 140/76, pulse 76, temperature 98 5 °F (36 9 °C), temperature source Oral, resp  rate 15, height 6' 3" (1 905 m), weight 115 kg (253 lb 12 8 oz), SpO2 98 %  ,Body mass index is 31 72 kg/m²        Intake/Output Summary (Last 24 hours) at 12/28/2019 0957  Last data filed at 12/28/2019 9317  Gross per 24 hour   Intake 2517 5 ml   Output 145 ml   Net 2372 5 ml       Invasive Devices     Peripheral Intravenous Line            Peripheral IV 12/27/19 Right Hand less than 1 day          Drain            Closed/Suction Drain Midline Abdomen Bulb less than 1 day    Closed/Suction Drain RLQ Bulb less than 1 day                Physical Exam: /76 (BP Location: Right arm)   Pulse 76   Temp 98 5 °F (36 9 °C) (Oral)   Resp 15   Ht 6' 3" (1 905 m)   Wt 115 kg (253 lb 12 8 oz)   SpO2 98%   BMI 31 72 kg/m²   General appearance: alert and oriented, in no acute distress  Head: Normocephalic, without obvious abnormality, atraumatic  Lungs: clear to auscultation bilaterally  Heart: regular rate and rhythm  Abdomen: soft, bowel sounds present, tender around surgical incision, mepilex dressing and drain dressings in place, ANNA drain output is sanguinous   Extremities: extremities normal, warm and well-perfused; no cyanosis, clubbing, or edema  Skin: Skin color, texture, turgor normal  No rashes or lesions    Lab, Imaging and other studies:  I have personally reviewed pertinent lab results      VTE Pharmacologic Prophylaxis: Enoxaparin (Lovenox)  VTE Mechanical Prophylaxis: sequential compression device

## 2019-12-28 NOTE — PLAN OF CARE
Problem: PAIN - ADULT  Goal: Verbalizes/displays adequate comfort level or baseline comfort level  Description  Interventions:  - Encourage patient to monitor pain and request assistance  - Assess pain using appropriate pain scale  - Administer analgesics based on type and severity of pain and evaluate response  - Implement non-pharmacological measures as appropriate and evaluate response  - Consider cultural and social influences on pain and pain management  - Notify physician/advanced practitioner if interventions unsuccessful or patient reports new pain  Outcome: Progressing     Problem: INFECTION - ADULT  Goal: Absence or prevention of progression during hospitalization  Description  INTERVENTIONS:  - Assess and monitor for signs and symptoms of infection  - Monitor lab/diagnostic results  - Monitor all insertion sites, i e  indwelling lines, tubes, and drains  - [place] appropriate cooling/warming therapies per order  - Administer medications as ordered  - Instruct and encourage patient and family to use good hand hygiene technique  - Identify and instruct in appropriate isolation precautions for identified infection/condition   Outcome: Progressing     Problem: SAFETY ADULT  Goal: Patient will remain free of falls  Description  INTERVENTIONS:  - Assess patient frequently for physical needs  -  Identify cognitive and physical deficits and behaviors that affect risk of falls    -  [place] fall precautions as indicated by assessment   - Educate patient/family on patient safety including physical limitations  - Instruct patient to call for assistance with activity based on assessment  - Modify environment to reduce risk of injury  - Consider OT/PT consult to assist with strengthening/mobility  Outcome: Progressing  Goal: Maintain or return to baseline ADL function  Description  INTERVENTIONS:  -  Assess patient's ability to carry out ADLs; assess patient's baseline for ADL function and identify physical deficits which impact ability to perform ADLs (bathing, care of mouth/teeth, toileting, grooming, dressing, etc )  - Assess/evaluate cause of self-care deficits   - Assess range of motion  - Assess patient's mobility; develop plan if impaired  - Assess patient's need for assistive devices and provide as appropriate  - Encourage maximum independence but intervene and supervise when necessary  - Involve family in performance of ADLs  - Assess for home care needs following discharge   - Consider OT consult to assist with ADL evaluation and planning for discharge  - Provide patient education as appropriate  Outcome: Progressing  Goal: Maintain or return mobility status to optimal level  Description  INTERVENTIONS:  - Assess patient's baseline mobility status (ambulation, transfers, stairs, etc )    - Identify cognitive and physical deficits and behaviors that affect mobility  - Identify mobility aids required to assist with transfers and/or ambulation (gait belt, sit-to-stand, lift, walker, cane, etc )  - [place] fall precautions as indicated by assessment  - Instruct patient to call for assistance with activity based on assessment   Outcome: Progressing     Problem: DISCHARGE PLANNING  Goal: Discharge to home or other facility with appropriate resources  Description  INTERVENTIONS:  - Identify barriers to discharge w/patient and caregiver  - Arrange for needed discharge resources and transportation as appropriate  - Identify discharge learning needs (meds, wound care, etc )  - Arrange for interpretive services to assist at discharge as needed  - Refer to Case Management Department for coordinating discharge planning if the patient needs post-hospital services based on physician/advanced practitioner order or complex needs related to functional status, cognitive ability, or social support system  Outcome: Progressing     Problem: Knowledge Deficit  Goal: Patient/family/caregiver demonstrates understanding of disease process, treatment plan, medications, and discharge instructions  Description  Complete learning assessment and assess knowledge base    Interventions:  - Provide teaching at level of understanding  - Provide teaching via preferred learning methods  Outcome: Progressing

## 2019-12-28 NOTE — PLAN OF CARE
Problem: PAIN - ADULT  Goal: Verbalizes/displays adequate comfort level or baseline comfort level  Description  Interventions:  - Encourage patient to monitor pain and request assistance  - Assess pain using appropriate pain scale  - Administer analgesics based on type and severity of pain and evaluate response  - Implement non-pharmacological measures as appropriate and evaluate response  - Consider cultural and social influences on pain and pain management  - Notify physician/advanced practitioner if interventions unsuccessful or patient reports new pain  Outcome: Progressing     Problem: INFECTION - ADULT  Goal: Absence or prevention of progression during hospitalization  Description  INTERVENTIONS:  - Assess and monitor for signs and symptoms of infection  - Monitor lab/diagnostic results  - Monitor all insertion sites, i e  indwelling lines, tubes, and drains  - [place] appropriate cooling/warming therapies per order  - Administer medications as ordered  - Instruct and encourage patient and family to use good hand hygiene technique  - Identify and instruct in appropriate isolation precautions for identified infection/condition   Outcome: Progressing     Problem: SAFETY ADULT  Goal: Patient will remain free of falls  Description  INTERVENTIONS:  - Assess patient frequently for physical needs  -  Identify cognitive and physical deficits and behaviors that affect risk of falls    -  [place] fall precautions as indicated by assessment   - Educate patient/family on patient safety including physical limitations  - Instruct patient to call for assistance with activity based on assessment  - Modify environment to reduce risk of injury  - Consider OT/PT consult to assist with strengthening/mobility  Outcome: Progressing  Goal: Maintain or return to baseline ADL function  Description  INTERVENTIONS:  -  Assess patient's ability to carry out ADLs; assess patient's baseline for ADL function and identify physical deficits which impact ability to perform ADLs (bathing, care of mouth/teeth, toileting, grooming, dressing, etc )  - Assess/evaluate cause of self-care deficits   - Assess range of motion  - Assess patient's mobility; develop plan if impaired  - Assess patient's need for assistive devices and provide as appropriate  - Encourage maximum independence but intervene and supervise when necessary  - Involve family in performance of ADLs  - Assess for home care needs following discharge   - Consider OT consult to assist with ADL evaluation and planning for discharge  - Provide patient education as appropriate  Outcome: Progressing  Goal: Maintain or return mobility status to optimal level  Description  INTERVENTIONS:  - Assess patient's baseline mobility status (ambulation, transfers, stairs, etc )    - Identify cognitive and physical deficits and behaviors that affect mobility  - Identify mobility aids required to assist with transfers and/or ambulation (gait belt, sit-to-stand, lift, walker, cane, etc )  - [place] fall precautions as indicated by assessment  - Instruct patient to call for assistance with activity based on assessment   Outcome: Progressing     Problem: DISCHARGE PLANNING  Goal: Discharge to home or other facility with appropriate resources  Description  INTERVENTIONS:  - Identify barriers to discharge w/patient and caregiver  - Arrange for needed discharge resources and transportation as appropriate  - Identify discharge learning needs (meds, wound care, etc )  - Arrange for interpretive services to assist at discharge as needed  - Refer to Case Management Department for coordinating discharge planning if the patient needs post-hospital services based on physician/advanced practitioner order or complex needs related to functional status, cognitive ability, or social support system  Outcome: Progressing     Problem: Knowledge Deficit  Goal: Patient/family/caregiver demonstrates understanding of disease process, treatment plan, medications, and discharge instructions  Description  Complete learning assessment and assess knowledge base    Interventions:  - Provide teaching at level of understanding  - Provide teaching via preferred learning methods  Outcome: Progressing

## 2019-12-29 VITALS
HEIGHT: 75 IN | DIASTOLIC BLOOD PRESSURE: 81 MMHG | BODY MASS INDEX: 31.56 KG/M2 | SYSTOLIC BLOOD PRESSURE: 171 MMHG | TEMPERATURE: 98.4 F | RESPIRATION RATE: 18 BRPM | HEART RATE: 78 BPM | OXYGEN SATURATION: 96 % | WEIGHT: 253.8 LBS

## 2019-12-29 LAB
ANION GAP SERPL CALCULATED.3IONS-SCNC: 3 MMOL/L (ref 4–13)
BUN SERPL-MCNC: 14 MG/DL (ref 5–25)
CALCIUM SERPL-MCNC: 7.7 MG/DL (ref 8.3–10.1)
CHLORIDE SERPL-SCNC: 103 MMOL/L (ref 100–108)
CO2 SERPL-SCNC: 31 MMOL/L (ref 21–32)
CREAT SERPL-MCNC: 1.06 MG/DL (ref 0.6–1.3)
GFR SERPL CREATININE-BSD FRML MDRD: 80 ML/MIN/1.73SQ M
GLUCOSE P FAST SERPL-MCNC: 90 MG/DL (ref 65–99)
GLUCOSE SERPL-MCNC: 87 MG/DL (ref 65–140)
GLUCOSE SERPL-MCNC: 90 MG/DL (ref 65–140)
GLUCOSE SERPL-MCNC: 96 MG/DL (ref 65–140)
POTASSIUM SERPL-SCNC: 4.1 MMOL/L (ref 3.5–5.3)
SODIUM SERPL-SCNC: 137 MMOL/L (ref 136–145)

## 2019-12-29 PROCEDURE — 80048 BASIC METABOLIC PNL TOTAL CA: CPT | Performed by: PHYSICIAN ASSISTANT

## 2019-12-29 PROCEDURE — 99024 POSTOP FOLLOW-UP VISIT: CPT | Performed by: SURGERY

## 2019-12-29 PROCEDURE — 82948 REAGENT STRIP/BLOOD GLUCOSE: CPT

## 2019-12-29 RX ORDER — OXYCODONE HYDROCHLORIDE 5 MG/1
TABLET ORAL
Qty: 18 TABLET | Refills: 0 | Status: SHIPPED | OUTPATIENT
Start: 2019-12-29 | End: 2020-02-13 | Stop reason: ALTCHOICE

## 2019-12-29 RX ADMIN — KETOROLAC TROMETHAMINE 30 MG: 30 INJECTION, SOLUTION INTRAMUSCULAR at 11:49

## 2019-12-29 RX ADMIN — OXYCODONE HYDROCHLORIDE 10 MG: 10 TABLET ORAL at 04:47

## 2019-12-29 RX ADMIN — HYDROMORPHONE HYDROCHLORIDE 0.5 MG: 1 INJECTION, SOLUTION INTRAMUSCULAR; INTRAVENOUS; SUBCUTANEOUS at 05:58

## 2019-12-29 RX ADMIN — ACETAMINOPHEN 650 MG: 325 TABLET, FILM COATED ORAL at 08:53

## 2019-12-29 RX ADMIN — SENNOSIDES 8.6 MG: 8.6 TABLET, FILM COATED ORAL at 08:54

## 2019-12-29 RX ADMIN — ENOXAPARIN SODIUM 40 MG: 40 INJECTION SUBCUTANEOUS at 08:54

## 2019-12-29 RX ADMIN — KETOROLAC TROMETHAMINE 30 MG: 30 INJECTION, SOLUTION INTRAMUSCULAR at 05:58

## 2019-12-29 NOTE — DISCHARGE SUMMARY
Discharge Summary - Rose Moreira 48 y o  male MRN: [id number]    Unit/Bed#: [address] [place] [address] Encounter: [id number]    Admission Date: 12/27/2019   Discharge Date: 12/29/2019    Admitting Diagnosis:   Incisional hernia [K43 2]    Discharge Diagnoses: Principal Problem:    Incisional hernia      Consultations:  n/a    Procedures Performed:  Incisional hernia repair with implantation of mesh    Hospital Course: Rose Moreira is a 48 y o  male admitted for elective incisional hernia repair with [id number] biologic mesh in the retro-rectus space  2 ANNA drains were placed intraoperatively, 1 in the retro rectus space and 1 in the subcutaneous tissue  Patient kept inpatient postoperatively to receive 24 hrs postop IV Ancef and for pain control  Patient tolerated a regular diabetic diet without any issue and was ambulating the hallways multiple times per day  Patient received ANNA drain education at bedside prior to discharge  Patient was discharged with 2 remaining ANNA drains and instructed to follow up in 2 weeks  Condition at Discharge: good     Discharge instructions/Information to patient and family:   See after visit summary for information provided to patient and family  Provisions for Follow-Up Care:  See after visit summary for information related to follow-up care and any pertinent home health orders  Disposition: Home    Planned Readmission: No    Discharge Statement   I spent 25 minutes discharging the patient  This time was spent on the day of discharge  I had direct contact with the patient on the day of discharge  Additional documentation is required if more than 30 minutes were spent on discharge  Discharge Medications:  See after visit summary for reconciled discharge medications provided to patient and family

## 2019-12-29 NOTE — DISCHARGE INSTR - AVS FIRST PAGE
Post-Operative Care Instructions      1  General: You may feel pulling sensations around the wound or funny aches and pains around the incisions  This is normal  Even minor surgery is a change in your body and this is your body's reaction to it  If you have had abdominal surgery, it may help to support the incision with a small pillow or blanket for comfort when moving or coughing  2  Wound care: The glue over the incisions will fall off over the next week or two  If you have staples or stitches, they will be removed by the physician at your follow up appointment  You may change the guaze around the two drains as needed  Try to leave the midline dressing on until 1/1/2020  Continue to document the amount of output you get daily from each drain  3  Showering: You may shower  Do not soak wound in a bath, hot tub, pool, lake, etc  Do not scrub or use exfoliants on the surgical wounds  4  Activity: You may go up and down stairs, walk as much as you are comfortable, but walk at least 3 times each day  If you have had abdominal surgery, do not perform any strenuous exercise or lift anything heavier than 10-15 pounds for at least 3 weeks, unless cleared by your physician  5  Diet: You may resume your regular diabetic diet  6  Medications: Resume all of your previous medications, unless told otherwise by the doctor  A good option for pain control is to start with acetaminophen(Tylenol) 650mg and ibuprofen(Advil) 600mg and alternate taking them every 3 hours  If this is not sufficient then you make take the narcotic pain medicine as prescribed  You do not need to take the narcotic pain medication unless you are having significant pain and discomfort  Insure that you do not take more than 4000 mg of Tylenol per day  Please try to avoid taking Xanax during the days you take oxycodone  7  Driving: You will need someone to drive you home on the day of surgery   Do not drive or make any important decisions while on narcotic pain medication or for up to 24 hours after anesthesia for surgery  Generally, you may drive when you're off all narcotic pain medications  8  Upset Stomach: You may take Maalox, Tums, or similar items for an upset stomach  If your narcotic pain medication causes an upset stomach, do not take it on an empty stomach  Try taking it with at least some crackers or toast      9  Constipation: Patients often experienced constipation after surgery  You may take over-the-counter medication for this, such as Metamucil, Senokot, Colace, milk of magnesia, etc  If you experience significant nausea or vomiting after abdominal surgery, call the office before trying any of these medications  10  Call the office: If you are experiencing any of the following: fevers above 101 5°, significant nausea or vomiting, if the wound develops drainage and/or excessive redness around the wound, or if you have significant diarrhea or other worsening symptoms  11  Pain: You may be given a prescription for pain medication   This should be given to you upon discharge from the hospital

## 2019-12-29 NOTE — PLAN OF CARE
Problem: PAIN - ADULT  Goal: Verbalizes/displays adequate comfort level or baseline comfort level  Description  Interventions:  - Encourage patient to monitor pain and request assistance  - Assess pain using appropriate pain scale  - Administer analgesics based on type and severity of pain and evaluate response  - Implement non-pharmacological measures as appropriate and evaluate response  - Consider cultural and social influences on pain and pain management  - Notify physician/advanced practitioner if interventions unsuccessful or patient reports new pain  Outcome: Completed     Problem: INFECTION - ADULT  Goal: Absence or prevention of progression during hospitalization  Description  INTERVENTIONS:  - Assess and monitor for signs and symptoms of infection  - Monitor lab/diagnostic results  - Monitor all insertion sites, i e  indwelling lines, tubes, and drains  - [place] appropriate cooling/warming therapies per order  - Administer medications as ordered  - Instruct and encourage patient and family to use good hand hygiene technique  - Identify and instruct in appropriate isolation precautions for identified infection/condition   Outcome: Completed     Problem: SAFETY ADULT  Goal: Patient will remain free of falls  Description  INTERVENTIONS:  - Assess patient frequently for physical needs  -  Identify cognitive and physical deficits and behaviors that affect risk of falls    -  [place] fall precautions as indicated by assessment   - Educate patient/family on patient safety including physical limitations  - Instruct patient to call for assistance with activity based on assessment  - Modify environment to reduce risk of injury  - Consider OT/PT consult to assist with strengthening/mobility  Outcome: Completed  Goal: Maintain or return to baseline ADL function  Description  INTERVENTIONS:  -  Assess patient's ability to carry out ADLs; assess patient's baseline for ADL function and identify physical deficits which impact ability to perform ADLs (bathing, care of mouth/teeth, toileting, grooming, dressing, etc )  - Assess/evaluate cause of self-care deficits   - Assess range of motion  - Assess patient's mobility; develop plan if impaired  - Assess patient's need for assistive devices and provide as appropriate  - Encourage maximum independence but intervene and supervise when necessary  - Involve family in performance of ADLs  - Assess for home care needs following discharge   - Consider OT consult to assist with ADL evaluation and planning for discharge  - Provide patient education as appropriate  Outcome: Completed  Goal: Maintain or return mobility status to optimal level  Description  INTERVENTIONS:  - Assess patient's baseline mobility status (ambulation, transfers, stairs, etc )    - Identify cognitive and physical deficits and behaviors that affect mobility  - Identify mobility aids required to assist with transfers and/or ambulation (gait belt, sit-to-stand, lift, walker, cane, etc )  - [place] fall precautions as indicated by assessment  - Instruct patient to call for assistance with activity based on assessment   Outcome: Completed     Problem: DISCHARGE PLANNING  Goal: Discharge to home or other facility with appropriate resources  Description  INTERVENTIONS:  - Identify barriers to discharge w/patient and caregiver  - Arrange for needed discharge resources and transportation as appropriate  - Identify discharge learning needs (meds, wound care, etc )  - Arrange for interpretive services to assist at discharge as needed  - Refer to Case Management Department for coordinating discharge planning if the patient needs post-hospital services based on physician/advanced practitioner order or complex needs related to functional status, cognitive ability, or social support system  Outcome: Completed     Problem: Knowledge Deficit  Goal: Patient/family/caregiver demonstrates understanding of disease process, treatment plan, medications, and discharge instructions  Description  Complete learning assessment and assess knowledge base    Interventions:  - Provide teaching at level of understanding  - Provide teaching via preferred learning methods  Outcome: Completed

## 2019-12-29 NOTE — NURSING NOTE
Discharge instructions discussed with patient, all questions answered  Patient left 2 S stable, all personal belongings at side

## 2019-12-29 NOTE — PLAN OF CARE
Problem: PAIN - ADULT  Goal: Verbalizes/displays adequate comfort level or baseline comfort level  Description  Interventions:  - Encourage patient to monitor pain and request assistance  - Assess pain using appropriate pain scale  - Administer analgesics based on type and severity of pain and evaluate response  - Implement non-pharmacological measures as appropriate and evaluate response  - Consider cultural and social influences on pain and pain management  - Notify physician/advanced practitioner if interventions unsuccessful or patient reports new pain  Outcome: Progressing     Problem: INFECTION - ADULT  Goal: Absence or prevention of progression during hospitalization  Description  INTERVENTIONS:  - Assess and monitor for signs and symptoms of infection  - Monitor lab/diagnostic results  - Monitor all insertion sites, i e  indwelling lines, tubes, and drains  - [place] appropriate cooling/warming therapies per order  - Administer medications as ordered  - Instruct and encourage patient and family to use good hand hygiene technique  - Identify and instruct in appropriate isolation precautions for identified infection/condition   Outcome: Progressing     Problem: SAFETY ADULT  Goal: Patient will remain free of falls  Description  INTERVENTIONS:  - Assess patient frequently for physical needs  -  Identify cognitive and physical deficits and behaviors that affect risk of falls    -  [place] fall precautions as indicated by assessment   - Educate patient/family on patient safety including physical limitations  - Instruct patient to call for assistance with activity based on assessment  - Modify environment to reduce risk of injury  - Consider OT/PT consult to assist with strengthening/mobility  Outcome: Progressing  Goal: Maintain or return to baseline ADL function  Description  INTERVENTIONS:  -  Assess patient's ability to carry out ADLs; assess patient's baseline for ADL function and identify physical deficits which impact ability to perform ADLs (bathing, care of mouth/teeth, toileting, grooming, dressing, etc )  - Assess/evaluate cause of self-care deficits   - Assess range of motion  - Assess patient's mobility; develop plan if impaired  - Assess patient's need for assistive devices and provide as appropriate  - Encourage maximum independence but intervene and supervise when necessary  - Involve family in performance of ADLs  - Assess for home care needs following discharge   - Consider OT consult to assist with ADL evaluation and planning for discharge  - Provide patient education as appropriate  Outcome: Progressing  Goal: Maintain or return mobility status to optimal level  Description  INTERVENTIONS:  - Assess patient's baseline mobility status (ambulation, transfers, stairs, etc )    - Identify cognitive and physical deficits and behaviors that affect mobility  - Identify mobility aids required to assist with transfers and/or ambulation (gait belt, sit-to-stand, lift, walker, cane, etc )  - [place] fall precautions as indicated by assessment  - Instruct patient to call for assistance with activity based on assessment   Outcome: Progressing     Problem: DISCHARGE PLANNING  Goal: Discharge to home or other facility with appropriate resources  Description  INTERVENTIONS:  - Identify barriers to discharge w/patient and caregiver  - Arrange for needed discharge resources and transportation as appropriate  - Identify discharge learning needs (meds, wound care, etc )  - Arrange for interpretive services to assist at discharge as needed  - Refer to Case Management Department for coordinating discharge planning if the patient needs post-hospital services based on physician/advanced practitioner order or complex needs related to functional status, cognitive ability, or social support system  Outcome: Progressing     Problem: Knowledge Deficit  Goal: Patient/family/caregiver demonstrates understanding of disease process, treatment plan, medications, and discharge instructions  Description  Complete learning assessment and assess knowledge base    Interventions:  - Provide teaching at level of understanding  - Provide teaching via preferred learning methods  Outcome: Progressing

## 2019-12-30 ENCOUNTER — TELEPHONE (OUTPATIENT)
Dept: SURGERY | Facility: CLINIC | Age: 54
End: 2019-12-30

## 2019-12-30 NOTE — TELEPHONE ENCOUNTER
it okay but is drain probably should remain till 1/9 as biologic mesh was placed by Dr Celeste Meraz

## 2019-12-30 NOTE — TELEPHONE ENCOUNTER
Pt is s/p incisional hernia by Dr Nely Rader  His post op is scheduled for 1/9 with him  Pt was under the impression that drains could come out sooner, and he would like to be seen to have them taken out  Dr Morton Gilford:  I, tentatively, scheduled him with you on 1/3, Friday, just to remove drains, but kept post op appt with Dr Nely Rader for 1/9  Is this ok or would you rather how patient wait until 1/9 for drains  Please advise      Thank you, Mo

## 2020-01-09 ENCOUNTER — OFFICE VISIT (OUTPATIENT)
Dept: SURGERY | Facility: CLINIC | Age: 55
End: 2020-01-09

## 2020-01-09 DIAGNOSIS — Z09 POSTOPERATIVE EXAMINATION: Primary | ICD-10-CM

## 2020-01-09 PROBLEM — K43.2 INCISIONAL HERNIA: Status: RESOLVED | Noted: 2019-12-05 | Resolved: 2020-01-09

## 2020-01-09 PROCEDURE — 99024 POSTOP FOLLOW-UP VISIT: CPT | Performed by: SURGERY

## 2020-01-09 NOTE — PROGRESS NOTES
Patient is status post ventral hernia repair with biologic mesh 27 December 2019  Minimal output from both ANNA drains    Tolerating a regular diet with normal bowel function  No wound complaints  Incision healing well  Staples removed  Both ANNA drains removed and dressing placed  Follow-up p r n

## 2020-01-14 DIAGNOSIS — G47.00 INSOMNIA, UNSPECIFIED TYPE: ICD-10-CM

## 2020-01-14 RX ORDER — ZOLPIDEM TARTRATE 10 MG/1
10 TABLET ORAL
Qty: 30 TABLET | Refills: 0 | Status: SHIPPED | OUTPATIENT
Start: 2020-01-14 | End: 2020-05-11

## 2020-01-23 DIAGNOSIS — K80.20 SYMPTOMATIC CHOLELITHIASIS: Primary | ICD-10-CM

## 2020-01-28 ENCOUNTER — HOSPITAL ENCOUNTER (OUTPATIENT)
Dept: RADIOLOGY | Facility: HOSPITAL | Age: 55
Discharge: HOME | End: 2020-01-28
Attending: SURGERY
Payer: COMMERCIAL

## 2020-01-28 ENCOUNTER — TRANSCRIBE ORDERS (OUTPATIENT)
Dept: ADMINISTRATIVE | Facility: HOSPITAL | Age: 55
End: 2020-01-28

## 2020-01-28 DIAGNOSIS — K80.20 SYMPTOMATIC CHOLELITHIASIS: ICD-10-CM

## 2020-01-28 PROCEDURE — 76705 ECHO EXAM OF ABDOMEN: CPT

## 2020-01-31 ENCOUNTER — TELEPHONE (OUTPATIENT)
Dept: SURGERY | Facility: CLINIC | Age: 55
End: 2020-01-31

## 2020-01-31 NOTE — TELEPHONE ENCOUNTER
Radiology called with significant abnormal findings on this patient ultrasound of the abdomen   Dr Dena Ragsdale

## 2020-02-03 ENCOUNTER — PROCEDURE VISIT (OUTPATIENT)
Dept: NEUROLOGY | Facility: CLINIC | Age: 55
End: 2020-02-03
Payer: COMMERCIAL

## 2020-02-03 ENCOUNTER — TELEPHONE (OUTPATIENT)
Dept: NEUROLOGY | Facility: CLINIC | Age: 55
End: 2020-02-03

## 2020-02-03 DIAGNOSIS — R20.2 NUMBNESS AND TINGLING OF FOOT: ICD-10-CM

## 2020-02-03 DIAGNOSIS — R20.0 NUMBNESS AND TINGLING OF FOOT: ICD-10-CM

## 2020-02-03 PROCEDURE — 95909 NRV CNDJ TST 5-6 STUDIES: CPT | Performed by: PHYSICAL MEDICINE & REHABILITATION

## 2020-02-03 PROCEDURE — 95886 MUSC TEST DONE W/N TEST COMP: CPT | Performed by: PHYSICAL MEDICINE & REHABILITATION

## 2020-02-03 NOTE — TELEPHONE ENCOUNTER
Patient came in today for an EMG NCV RLE    He stated he wanted to have both legs done,  I contacted his PCP,  If he wanted his LLE done he will have to go back to see his PCP first since there isnt any notes regarding his LLE

## 2020-02-03 NOTE — PROGRESS NOTES
EMG 1 Limb     Date/Time 2/3/2020 11:48 AM     Performed by  Lamine Bian MD     Authorized by Harsh Yepez MD      [place] Protocol Consent: Verbal consent obtained  Risks and benefits: risks, benefits and alternatives were discussed  Consent given by: patient  Patient understanding: patient states understanding of the procedure being performed  Patient consent: the patient's understanding of the procedure matches consent given  Patient identity confirmed: verbally with patient               EMG RIGHT LOWER EXTREMITY  22-year-old male presents with bilateral feet numbness for the last several months  He denies any back pain  He denies any pain or weakness in his legs  Patient is being evaluated for a lumbosacral radiculopathy  On physical examination, motor strength is 5/5 throughout  Sensations are intact to light touch and pinprick bilaterally  Motor and sensory conduction studies were performed on the right peroneal tibial and sural nerves  The distal motor latencies were normal  Motor action potential amplitudes were normal  Motor conduction studies were normal including conduction of the peroneal nerve across the fibular head  Right peroneal F-wave was prolonged at 59 7 milliseconds  The  tibial F wave was normal     The right sural distal sensory latency was normal with a low sensory action potential amplitude of 0 1 microvolt  The superficial peroneal sensory peak latency was normal with a low sensory action potential amplitude of 4 microvolts  The bilateral H reflexes were normal     Concentric needle EMG was performed in the right EDB, tibialis anterior, medial gastrocnemius, vastus lateralis, gluteus medius and the lumbar paraspinal region  There was no evidence of spontaneous activity seen  The compound motor unit potentials were of normal configuration and interference patterns were full were full for effort    IMPRESSION: There is electrophysiologic evidence of a:    1  Mild sensory neuropathy as evidenced by the abnormal sural and superficial peroneal sensory nerve conduction studies  2  There is no evidence of a lumbosacral radiculopathy  Clinical correlation is recommended      MAYRA August

## 2020-02-12 LAB
ALBUMIN SERPL-MCNC: 4.5 G/DL (ref 3.8–4.9)
ALBUMIN/GLOB SERPL: 1.9 {RATIO} (ref 1.2–2.2)
ALP SERPL-CCNC: 62 IU/L (ref 39–117)
ALT SERPL-CCNC: 25 IU/L (ref 0–44)
AST SERPL-CCNC: 21 IU/L (ref 0–40)
BASOPHILS # BLD AUTO: 0 X10E3/UL (ref 0–0.2)
BASOPHILS NFR BLD AUTO: 0 %
BILIRUB SERPL-MCNC: 0.4 MG/DL (ref 0–1.2)
BUN SERPL-MCNC: 16 MG/DL (ref 6–24)
BUN/CREAT SERPL: 14 (ref 9–20)
CALCIUM SERPL-MCNC: 9.4 MG/DL (ref 8.7–10.2)
CHLORIDE SERPL-SCNC: 100 MMOL/L (ref 96–106)
CHOLEST SERPL-MCNC: 123 MG/DL (ref 100–199)
CO2 SERPL-SCNC: 28 MMOL/L (ref 20–29)
CREAT SERPL-MCNC: 1.12 MG/DL (ref 0.76–1.27)
EOSINOPHIL # BLD AUTO: 0.1 X10E3/UL (ref 0–0.4)
EOSINOPHIL NFR BLD AUTO: 1 %
ERYTHROCYTE [DISTWIDTH] IN BLOOD BY AUTOMATED COUNT: 15 % (ref 11.6–15.4)
EST. AVERAGE GLUCOSE BLD GHB EST-MCNC: 117 MG/DL
GLOBULIN SER-MCNC: 2.4 G/DL (ref 1.5–4.5)
GLUCOSE SERPL-MCNC: 112 MG/DL (ref 65–99)
HBA1C MFR BLD: 5.7 % (ref 4.8–5.6)
HCT VFR BLD AUTO: 45 % (ref 37.5–51)
HDLC SERPL-MCNC: 44 MG/DL
HGB BLD-MCNC: 15.1 G/DL (ref 13–17.7)
IMM GRANULOCYTES # BLD: 0 X10E3/UL (ref 0–0.1)
IMM GRANULOCYTES NFR BLD: 0 %
LDLC SERPL CALC-MCNC: 69 MG/DL (ref 0–99)
LYMPHOCYTES # BLD AUTO: 1 X10E3/UL (ref 0.7–3.1)
LYMPHOCYTES NFR BLD AUTO: 17 %
MCH RBC QN AUTO: 27.3 PG (ref 26.6–33)
MCHC RBC AUTO-ENTMCNC: 33.6 G/DL (ref 31.5–35.7)
MCV RBC AUTO: 81 FL (ref 79–97)
MICRODELETION SYND BLD/T FISH: NORMAL
MONOCYTES # BLD AUTO: 0.6 X10E3/UL (ref 0.1–0.9)
MONOCYTES NFR BLD AUTO: 10 %
NEUTROPHILS # BLD AUTO: 4.5 X10E3/UL (ref 1.4–7)
NEUTROPHILS NFR BLD AUTO: 72 %
PLATELET # BLD AUTO: 162 X10E3/UL (ref 150–450)
POTASSIUM SERPL-SCNC: 5 MMOL/L (ref 3.5–5.2)
PROT SERPL-MCNC: 6.9 G/DL (ref 6–8.5)
RBC # BLD AUTO: 5.54 X10E6/UL (ref 4.14–5.8)
SL AMB EGFR AFRICAN AMERICAN: 87 ML/MIN/1.73
SL AMB EGFR NON AFRICAN AMERICAN: 75 ML/MIN/1.73
SL AMB VLDL CHOLESTEROL CALC: 10 MG/DL (ref 5–40)
SODIUM SERPL-SCNC: 141 MMOL/L (ref 134–144)
TRIGL SERPL-MCNC: 52 MG/DL (ref 0–149)
WBC # BLD AUTO: 6.2 X10E3/UL (ref 3.4–10.8)

## 2020-02-12 PROCEDURE — 3044F HG A1C LEVEL LT 7.0%: CPT | Performed by: ORTHOPAEDIC SURGERY

## 2020-02-13 ENCOUNTER — OFFICE VISIT (OUTPATIENT)
Dept: FAMILY MEDICINE CLINIC | Facility: CLINIC | Age: 55
End: 2020-02-13
Payer: COMMERCIAL

## 2020-02-13 VITALS
TEMPERATURE: 97.7 F | DIASTOLIC BLOOD PRESSURE: 60 MMHG | BODY MASS INDEX: 31.18 KG/M2 | SYSTOLIC BLOOD PRESSURE: 102 MMHG | OXYGEN SATURATION: 96 % | HEIGHT: 74 IN | RESPIRATION RATE: 16 BRPM | HEART RATE: 100 BPM | WEIGHT: 243 LBS

## 2020-02-13 DIAGNOSIS — F32.A DEPRESSION, UNSPECIFIED DEPRESSION TYPE: ICD-10-CM

## 2020-02-13 DIAGNOSIS — E78.2 MIXED HYPERLIPIDEMIA: ICD-10-CM

## 2020-02-13 DIAGNOSIS — E11.65 TYPE 2 DIABETES MELLITUS WITH HYPERGLYCEMIA, WITHOUT LONG-TERM CURRENT USE OF INSULIN (HCC): Primary | ICD-10-CM

## 2020-02-13 PROCEDURE — 99214 OFFICE O/P EST MOD 30 MIN: CPT | Performed by: INTERNAL MEDICINE

## 2020-02-13 PROCEDURE — 1036F TOBACCO NON-USER: CPT | Performed by: INTERNAL MEDICINE

## 2020-02-13 PROCEDURE — 3008F BODY MASS INDEX DOCD: CPT | Performed by: INTERNAL MEDICINE

## 2020-02-13 PROCEDURE — 2022F DILAT RTA XM EVC RTNOPTHY: CPT | Performed by: INTERNAL MEDICINE

## 2020-02-13 RX ORDER — FLUOXETINE HYDROCHLORIDE 20 MG/1
20 CAPSULE ORAL DAILY
Qty: 90 CAPSULE | Refills: 1 | Status: SHIPPED | OUTPATIENT
Start: 2020-02-13 | End: 2022-08-01

## 2020-02-13 NOTE — PROGRESS NOTES
Assessment/Plan:    1  Type 2 diabetes mellitus with hyperglycemia, without long-term current use of insulin (HCC)  Assessment & Plan:    Lab Results   Component Value Date    HGBA1C 5 7 (H) 02/11/2020     Glucose control is very good  Will change janumet to metformin 500 mg daily  He will follow up after bloodwork in 6 months  Continue diet and exercise  Advised on need for good and eye care  Orders:  -     metFORMIN (GLUCOPHAGE) 500 mg tablet; Take 1 tablet (500 mg total) by mouth daily with breakfast  -     CBC and differential; Future; Expected date: 08/13/2020  -     Comprehensive metabolic panel; Future; Expected date: 08/13/2020  -     Lipid panel; Future; Expected date: 08/13/2020  -     HEMOGLOBIN A1C W/ EAG ESTIMATION; Future; Expected date: 08/13/2020  -     CBC and differential  -     Comprehensive metabolic panel  -     Lipid panel  -     HEMOGLOBIN A1C W/ EAG ESTIMATION    2  Mixed hyperlipidemia  Assessment & Plan:  Reviewed labs and lipids are well controlled on atorvastatin; will continue to monitor  Orders:  -     CBC and differential; Future; Expected date: 08/13/2020  -     Comprehensive metabolic panel; Future; Expected date: 08/13/2020  -     Lipid panel; Future; Expected date: 08/13/2020  -     CBC and differential  -     Comprehensive metabolic panel  -     Lipid panel    3  Depression, unspecified depression type  Assessment & Plan:  Symptoms are currently stable on fluoxetine and he will continue  He will follow up in 6 months or sooner if symptoms worsen  Orders:  -     FLUoxetine (PROzac) 20 mg capsule; Take 1 capsule (20 mg total) by mouth daily          There are no Patient Instructions on file for this visit  Return in about 6 months (around 8/13/2020)  Subjective:      Patient ID: Alexander Posada is a 48 y o  male  Chief Complaint   Patient presents with    Follow-up     follow up for labs [id number]        Here for follow up of DM    He continues to exercise and be very strict with his diet  Weight is stable  Denies hypoglycemic episodes  No side effects with medications  Recently had EMG showing nonspecific peripheral neuropathy but no sign of lumbar radiculopathy  Symptoms are not bothersome and he does not want to take any more medications  Up to date with eye doctor  Examines feet regularly  Has been seeing a psychiatrist for depressive symptoms, is going through a divorce  Things are better with prozac and dose has been stable  Is asking if I can refill here due to cost of appointments  The following portions of the patient's history were reviewed and updated as appropriate: allergies, current medications, past family history, past medical history, past social history, past surgical history and problem list     Review of Systems   Constitutional: Negative  Respiratory: Negative  Cardiovascular: Negative  Neurological: Positive for numbness  Psychiatric/Behavioral: Negative            Current Outpatient Medications   Medication Sig Dispense Refill    allopurinol (ZYLOPRIM) 300 mg tablet       ALPRAZolam (XANAX) 0 25 mg tablet Take 0 25 mg by mouth 4 (four) times a day as needed for anxiety      atorvastatin (LIPITOR) 40 mg tablet TAKE 1 TABLET BY MOUTH EVERY DAY (Patient taking differently: 40 mg daily at bedtime ) 90 tablet 3    esomeprazole (NexIUM) 10 MG packet Take 30 mg by mouth daily at bedtime       Omega-3 Fatty Acids (FISH OIL) 1,000 mg Take 1,000 mg by mouth daily      tadalafil (CIALIS) 2 5 MG tablet Take 1 tablet (2 5 mg total) by mouth daily 30 tablet 3    VASCEPA 1 g CAPS       zolpidem (AMBIEN) 10 mg tablet Take 1 tablet (10 mg total) by mouth daily at bedtime as needed for sleep 30 tablet 0    FLUoxetine (PROzac) 20 mg capsule Take 1 capsule (20 mg total) by mouth daily 90 capsule 1    metFORMIN (GLUCOPHAGE) 500 mg tablet Take 1 tablet (500 mg total) by mouth daily with breakfast 90 tablet 3     No current facility-administered medications for this visit  Objective:    /60   Pulse 100   Temp 97 7 °F (36 5 °C)   Resp 16   Ht 6' 2" (1 88 m)   Wt 110 kg (243 lb)   SpO2 96%   BMI 31 20 kg/m²        Physical Exam   Constitutional: He appears well-developed and well-nourished  Eyes: Conjunctivae are normal    Neck: Neck supple  No JVD present  No thyromegaly present  Cardiovascular: Normal rate, regular rhythm, normal heart sounds and intact distal pulses  Exam reveals no gallop and no friction rub  Pulses are no weak pulses  No murmur heard  Pulses:       Dorsalis pedis pulses are 2+ on the right side, and 2+ on the left side  Pulmonary/Chest: Effort normal and breath sounds normal  He has no wheezes  He has no rales  Abdominal: Soft  Bowel sounds are normal  He exhibits no distension  There is no tenderness  Musculoskeletal: He exhibits no edema  Feet:   Right Foot:   Skin Integrity: Negative for ulcer, skin breakdown, erythema, warmth, callus or dry skin  Left Foot:   Skin Integrity: Negative for ulcer, skin breakdown, erythema, warmth, callus or dry skin  Psychiatric: He has a normal mood and affect  His behavior is normal  Judgment and thought content normal        Patient's shoes and socks removed  Right Foot/Ankle   Right Foot Inspection  Skin Exam: skin normal and skin intact no dry skin, no warmth, no callus, no erythema, no maceration, no abnormal color, no pre-ulcer, no ulcer and no callus                          Toe Exam: ROM and strength within normal limits  Sensory       Monofilament testing: intact  Vascular  Capillary refills: < 3 seconds  The right DP pulse is 2+       Left Foot/Ankle  Left Foot Inspection  Skin Exam: skin normal and skin intactno dry skin, no warmth, no erythema, no maceration, normal color, no pre-ulcer, no ulcer and no callus                         Toe Exam: ROM and strength within normal limits                   Sensory       Monofilament: intact  Vascular  Capillary refills: < 3 seconds  The left DP pulse is 2+  Assign Risk Category:  No deformity present; No loss of protective sensation;  No weak pulses       Risk: 0         Nelida Garcia MD

## 2020-02-14 ENCOUNTER — TELEPHONE (OUTPATIENT)
Dept: ADMINISTRATIVE | Facility: OTHER | Age: 55
End: 2020-02-14

## 2020-02-14 NOTE — LETTER
Procedure Request Form: Colonoscopy      Date Requested: 20  Patient: Cleatus   Patient : 1965   Referring Provider: Adam Ayala, MD        Date of Procedure ______________________________       The above patient has informed us that they have completed their   most recent Colonoscopy at your facility  Please complete   this form and attach all corresponding procedure reports/results  Comments __________________________________________________________  ____________________________________________________________________  ____________________________________________________________________  ____________________________________________________________________    Facility Completing Procedure _________________________________________    Form Completed By (print name) _______________________________________      Signature __________________________________________________________      These reports are needed for  compliance    Please fax this completed form and a copy of the procedure report to our office located at [place] [address] as soon as possible to [phone] attention Candance Hake: Phone [phone]    We thank you for your assistance in treating our mutual patient

## 2020-02-14 NOTE — ASSESSMENT & PLAN NOTE
Symptoms are currently stable on fluoxetine and he will continue  He will follow up in 6 months or sooner if symptoms worsen

## 2020-02-14 NOTE — ASSESSMENT & PLAN NOTE
Lab Results   Component Value Date    HGBA1C 5 7 (H) 02/11/2020     Glucose control is very good  Will change janumet to metformin 500 mg daily  He will follow up after bloodwork in 6 months  Continue diet and exercise  Advised on need for good and eye care

## 2020-02-14 NOTE — TELEPHONE ENCOUNTER
----- Message from Roland Fenton MD sent at 2/13/2020  7:32 PM EST -----  Please call Dr Vanessa Castro at [facility] for colonoscopy

## 2020-02-17 NOTE — TELEPHONE ENCOUNTER
02/17/20 3:53 PM     As a result of outreach to the external facility it was discovered that the patient did not have the requested CRC: Colonoscopy completed/given  We have spoke with the external facility and the patient did not have the requested service(s)  This message will now be closed  * I spoke with patient after I received a fax back from Dr Farhana Gibson office - no colonoscopy - I then called [facility]  they stated patient only had an EGD done their  I spoke with patient and he stated could be Dr Sherice Tenorio - I called [facility] and they stated patient had an Endo there in 2019  *To review the status updates documented on this request, open sent messages within your In Basket and select this message to view comments   If comments do not automatically appear for review, select the properties button to review*    Thank you  Himanshu Jordan MA

## 2020-03-02 ENCOUNTER — OFFICE VISIT (OUTPATIENT)
Dept: SURGERY | Facility: CLINIC | Age: 55
End: 2020-03-02
Payer: COMMERCIAL

## 2020-03-02 VITALS
SYSTOLIC BLOOD PRESSURE: 138 MMHG | TEMPERATURE: 97.7 F | WEIGHT: 253 LBS | BODY MASS INDEX: 32.47 KG/M2 | HEART RATE: 97 BPM | DIASTOLIC BLOOD PRESSURE: 78 MMHG | HEIGHT: 74 IN

## 2020-03-02 DIAGNOSIS — K83.8 COMMON BILE DUCT DILATION: Primary | ICD-10-CM

## 2020-03-02 PROCEDURE — 99214 OFFICE O/P EST MOD 30 MIN: CPT | Performed by: SURGERY

## 2020-03-02 PROCEDURE — 1036F TOBACCO NON-USER: CPT | Performed by: SURGERY

## 2020-03-02 PROCEDURE — 2022F DILAT RTA XM EVC RTNOPTHY: CPT | Performed by: SURGERY

## 2020-03-02 PROCEDURE — 3008F BODY MASS INDEX DOCD: CPT | Performed by: SURGERY

## 2020-03-02 NOTE — PROGRESS NOTES
[facility] History and Physical Note:    Assessment:  1  Gallbladder sludge and mildly dilated common bile duct  2  Complex right renal cyst not noticed on CT scan from October 2019    Plan:  1  Obtain labs that were already done that included a CMP and I will order MRCP  2  Follow-up with me after #1  3  Patient has a urologist already with whom he can follow-up for the right renal cyst    Chief Complaint:  " I am here after the ultrasound    HPI  Patient is here for follow-up of his right upper quadrant ultrasound:  IMPRESSION:    Probable gallbladder sludge with wall thickening but no gallstones  The common duct is mildly dilated at 8 mm  Distal stone/obstruction is not excluded should be correlated with LFTs  Direct biliary imaging should be considered with MRI/MRCP    Complex cyst in the right kidney with probable small calcifications or invaginating fat  This could be followed up in 6 months time or assessed at the time of imaging of the biliary system  The study was marked in EPIC for significant notification with follow-up  Of note, patient is status post complex ventral hernia repair from 2 months ago in which biologic mesh was placed in the retro rectus space  CT scan had preoperatively revealed no abnormalities with the right kidney, or biliary system      Patient denies any right upper quadrant pain    PMH:  Past Medical History:   Diagnosis Date    Anxiety     Colon polyp     Diabetes mellitus ([address] )     GERD (gastroesophageal reflux disease)     Hernia of abdominal wall     Hyperlipidemia        PSH:  Past Surgical History:   Procedure Laterality Date    COLON SURGERY  2013    HEMICOLECTOMY      INCISIONAL HERNIA REPAIR N/A 12/27/2019    Procedure: REPAIR HERNIA INCISIONAL;  Surgeon: Sonia Machado MD;  Location: [address] [place] [address];  Service: General    SD KNEE SCOPE,MED/LAT MENISECTOMY Left 10/17/2019    Procedure: ARTHROSCOPY KNEE MEDIAL MENISCECTOMY; Surgeon: Ange Alcaraz MD;  Location: [address] [place] [address];  Service: Orthopedics    SKIN CANCER EXCISION      WISDOM TOOTH EXTRACTION         Home Meds:  Current Outpatient Medications on File Prior to Visit   Medication Sig Dispense Refill    allopurinol (ZYLOPRIM) 300 mg tablet       ALPRAZolam (XANAX) 0 25 mg tablet Take 0 25 mg by mouth 4 (four) times a day as needed for anxiety      atorvastatin (LIPITOR) 40 mg tablet TAKE 1 TABLET BY MOUTH EVERY DAY (Patient taking differently: 40 mg daily at bedtime ) 90 tablet 3    esomeprazole (NexIUM) 10 MG packet Take 30 mg by mouth daily at bedtime       FLUoxetine (PROzac) 20 mg capsule Take 1 capsule (20 mg total) by mouth daily 90 capsule 1    metFORMIN (GLUCOPHAGE) 500 mg tablet Take 1 tablet (500 mg total) by mouth daily with breakfast 90 tablet 3    Omega-3 Fatty Acids (FISH OIL) 1,000 mg Take 1,000 mg by mouth daily      tadalafil (CIALIS) 2 5 MG tablet Take 1 tablet (2 5 mg total) by mouth daily 30 tablet 3    VASCEPA 1 g CAPS       zolpidem (AMBIEN) 10 mg tablet Take 1 tablet (10 mg total) by mouth daily at bedtime as needed for sleep 30 tablet 0     No current facility-administered medications on file prior to visit          Allergies:  No Known Allergies    Social Hx:  Social History     Socioeconomic History    Marital status: /Civil Union     Spouse name: Not on file    Number of children: Not on file    Years of education: Not on file    Highest education level: Not on file   Occupational History    Not on file   Social Needs    Financial resource strain: Not on file    Food insecurity:     Worry: Not on file     Inability: Not on file    Transportation needs:     Medical: Not on file     Non-medical: Not on file   Tobacco Use    Smoking status: Never Smoker    Smokeless tobacco: Never Used   Substance and Sexual Activity    Alcohol use: Never     Frequency: Never    Drug use: Never    Sexual activity: Not on file   Lifestyle    Physical activity:     Days per week: Not on file     Minutes per session: Not on file    Stress: Not on file   Relationships    Social connections:     Talks on phone: Not on file     Gets together: Not on file     Attends Christianity service: Not on file     Active member of club or organization: Not on file     Attends meetings of clubs or organizations: Not on file     Relationship status: Not on file    Intimate partner violence:     Fear of current or ex partner: Not on file     Emotionally abused: Not on file     Physically abused: Not on file     Forced sexual activity: Not on file   Other Topics Concern    Not on file   Social History Narrative    Not on file        Family Hx:    Family History   Problem Relation Age of Onset    Diabetes Mother     Lung cancer Mother     Hyperlipidemia Father     Hypertension Father     Hyperlipidemia Brother     Hypertension Brother          Review of Systems   Constitutional: Negative  HENT: Negative  Respiratory: Negative  Cardiovascular: Negative  Gastrointestinal: Negative  Genitourinary:        See HPI   Neurological: Negative  Hematological: Negative  There were no vitals taken for this visit  Physical Exam   Constitutional: He is oriented to person, place, and time  He appears well-developed and well-nourished  No distress  HENT:   Head: Normocephalic and atraumatic  Eyes: Pupils are equal, round, and reactive to light  Neck: Normal range of motion  Cardiovascular: Normal rate  Pulmonary/Chest: Effort normal  No respiratory distress  Abdominal: Soft  He exhibits no distension  Midline incision healing well  No signs of recurrent hernia  Musculoskeletal: Normal range of motion  Lymphadenopathy:     He has no cervical adenopathy  Neurological: He is alert and oriented to person, place, and time  Skin: Skin is warm and dry  Psychiatric: He has a normal mood and affect   His behavior is normal  Pertinent labs reviewed  I reviewed his most recent laboratory report   Pertinent images and available reads personally reviewed  I reviewed the ultrasound images and ultrasound report    I also reviewed the CT scan from October 2019  Pertinent notes reviewed       Alba Ya MD [address] [facility]  [phone]

## 2020-03-16 ENCOUNTER — OFFICE VISIT (OUTPATIENT)
Dept: SURGERY | Facility: CLINIC | Age: 55
End: 2020-03-16

## 2020-03-16 ENCOUNTER — TELEPHONE (OUTPATIENT)
Dept: SURGERY | Facility: CLINIC | Age: 55
End: 2020-03-16

## 2020-03-16 VITALS
DIASTOLIC BLOOD PRESSURE: 72 MMHG | SYSTOLIC BLOOD PRESSURE: 132 MMHG | WEIGHT: 254.2 LBS | HEIGHT: 74 IN | HEART RATE: 88 BPM | BODY MASS INDEX: 32.62 KG/M2 | TEMPERATURE: 97.5 F

## 2020-03-16 DIAGNOSIS — R10.9 ABDOMINAL DISCOMFORT: Primary | ICD-10-CM

## 2020-03-16 PROCEDURE — 99024 POSTOP FOLLOW-UP VISIT: CPT | Performed by: SURGERY

## 2020-03-16 PROCEDURE — 3008F BODY MASS INDEX DOCD: CPT | Performed by: SURGERY

## 2020-03-16 PROCEDURE — 1036F TOBACCO NON-USER: CPT | Performed by: SURGERY

## 2020-03-16 PROCEDURE — 2022F DILAT RTA XM EVC RTNOPTHY: CPT | Performed by: SURGERY

## 2020-03-16 NOTE — TELEPHONE ENCOUNTER
Left message to call office  Appointment today at 5:15pm   Called to see if patient can come in earlier

## 2020-03-16 NOTE — PROGRESS NOTES
[facility] History and Physical Note:    Assessment:  1  Normal postoperative exam  2  History of gallbladder sludge and dilated common bile duct    Plan:  1  Continue with core strengthening exercises  2  Follow up with workup with MRCP as previously discussed    Chief Complaint:  I have some pain near my hernia site    HPI  Patient is here for pain just to the right of his midline incision  He is status post complex ventral hernia repair for approximately 2 months ago with biologic mesh placed in the retro rectus space      PMH:  Past Medical History:   Diagnosis Date    Anxiety     Colon polyp     Diabetes mellitus ([address] )     GERD (gastroesophageal reflux disease)     Hernia of abdominal wall     Hyperlipidemia        PSH:  Past Surgical History:   Procedure Laterality Date    COLON SURGERY  2013    HEMICOLECTOMY      INCISIONAL HERNIA REPAIR N/A 12/27/2019    Procedure: REPAIR HERNIA INCISIONAL;  Surgeon: Светлана Mosley MD;  Location: [address] [place] [address];  Service: General    ME KNEE SCOPE,MED/LAT MENISECTOMY Left 10/17/2019    Procedure: ARTHROSCOPY KNEE MEDIAL MENISCECTOMY;  Surgeon: Diego Don MD;  Location: [address] [place] [address];  Service: Orthopedics    SKIN CANCER EXCISION      WISDOM TOOTH EXTRACTION         Home Meds:  Current Outpatient Medications on File Prior to Visit   Medication Sig Dispense Refill    allopurinol (ZYLOPRIM) 300 mg tablet       ALPRAZolam (XANAX) 0 25 mg tablet Take 0 25 mg by mouth 4 (four) times a day as needed for anxiety      atorvastatin (LIPITOR) 40 mg tablet TAKE 1 TABLET BY MOUTH EVERY DAY (Patient taking differently: 40 mg daily at bedtime ) 90 tablet 3    esomeprazole (NexIUM) 10 MG packet Take 30 mg by mouth daily at bedtime       FLUoxetine (PROzac) 20 mg capsule Take 1 capsule (20 mg total) by mouth daily 90 capsule 1    metFORMIN (GLUCOPHAGE) 500 mg tablet Take 1 tablet (500 mg total) by mouth daily with breakfast 90 tablet 3    Omega-3 Fatty Acids (FISH OIL) 1,000 mg Take 1,000 mg by mouth daily      tadalafil (CIALIS) 2 5 MG tablet Take 1 tablet (2 5 mg total) by mouth daily 30 tablet 3    VASCEPA 1 g CAPS       zolpidem (AMBIEN) 10 mg tablet Take 1 tablet (10 mg total) by mouth daily at bedtime as needed for sleep 30 tablet 0     No current facility-administered medications on file prior to visit          Allergies:  No Known Allergies    Social Hx:  Social History     Socioeconomic History    Marital status: /Civil Union     Spouse name: Not on file    Number of children: Not on file    Years of education: Not on file    Highest education level: Not on file   Occupational History    Not on file   Social Needs    Financial resource strain: Not on file    Food insecurity:     Worry: Not on file     Inability: Not on file    Transportation needs:     Medical: Not on file     Non-medical: Not on file   Tobacco Use    Smoking status: Never Smoker    Smokeless tobacco: Never Used   Substance and Sexual Activity    Alcohol use: Never     Frequency: Never    Drug use: Never    Sexual activity: Not on file   Lifestyle    Physical activity:     Days per week: Not on file     Minutes per session: Not on file    Stress: Not on file   Relationships    Social connections:     Talks on phone: Not on file     Gets together: Not on file     Attends Anabaptist service: Not on file     Active member of club or organization: Not on file     Attends meetings of clubs or organizations: Not on file     Relationship status: Not on file    Intimate partner violence:     Fear of current or ex partner: Not on file     Emotionally abused: Not on file     Physically abused: Not on file     Forced sexual activity: Not on file   Other Topics Concern    Not on file   Social History Narrative    Not on file        Family Hx:    Family History   Problem Relation Age of Onset    Diabetes Mother     Lung cancer Mother     Hyperlipidemia Father     Hypertension Father     Hyperlipidemia Brother     Hypertension Brother          Review of Systems   Constitutional: Negative for chills and fever  HENT: Negative  Respiratory: Negative  Cardiovascular: Negative  Gastrointestinal: Negative  Endocrine: Negative  Genitourinary: Negative  Musculoskeletal: Negative  Neurological: Negative  Hematological: Negative  /72 (BP Location: Left arm, Patient Position: Sitting, Cuff Size: Standard)   Pulse 88   Temp 97 5 °F (36 4 °C) (Tympanic)   Ht 6' 2" (1 88 m)   Wt 115 kg (254 lb 3 2 oz)   BMI 32 64 kg/m²     Physical Exam   Constitutional: He is oriented to person, place, and time  He appears well-developed and well-nourished  No distress  Cardiovascular: Normal rate  Pulmonary/Chest: Effort normal  No respiratory distress  Abdominal: Soft  He exhibits no distension  Very mild tenderness to palpation just to the right of his midline scar  No obvious hernia recurrence  Musculoskeletal: Normal range of motion  Neurological: He is alert and oriented to person, place, and time  Nonfocal   Skin: Skin is warm and dry  Psychiatric: He has a normal mood and affect   His behavior is normal        Pertinent labs reviewed    Pertinent images and available reads personally reviewed    Pertinent notes reviewed       Carley Mueller MD [address] [facility]  [phone]

## 2020-05-10 DIAGNOSIS — G47.00 INSOMNIA, UNSPECIFIED TYPE: ICD-10-CM

## 2020-05-11 RX ORDER — ZOLPIDEM TARTRATE 10 MG/1
TABLET ORAL
Qty: 30 TABLET | Refills: 0 | Status: SHIPPED | OUTPATIENT
Start: 2020-05-11 | End: 2020-07-08

## 2020-05-12 ENCOUNTER — TELEMEDICINE (OUTPATIENT)
Dept: FAMILY MEDICINE CLINIC | Facility: CLINIC | Age: 55
End: 2020-05-12
Payer: COMMERCIAL

## 2020-05-12 DIAGNOSIS — Z20.828 EXPOSURE TO SARS-ASSOCIATED CORONAVIRUS: Primary | ICD-10-CM

## 2020-05-12 DIAGNOSIS — Z20.828 EXPOSURE TO SARS-ASSOCIATED CORONAVIRUS: ICD-10-CM

## 2020-05-12 PROCEDURE — G2012 BRIEF CHECK IN BY MD/QHP: HCPCS | Performed by: FAMILY MEDICINE

## 2020-05-12 PROCEDURE — U0003 INFECTIOUS AGENT DETECTION BY NUCLEIC ACID (DNA OR RNA); SEVERE ACUTE RESPIRATORY SYNDROME CORONAVIRUS 2 (SARS-COV-2) (CORONAVIRUS DISEASE [COVID-19]), AMPLIFIED PROBE TECHNIQUE, MAKING USE OF HIGH THROUGHPUT TECHNOLOGIES AS DESCRIBED BY CMS-2020-01-R: HCPCS

## 2020-05-14 LAB — SARS-COV-2 RNA SPEC QL NAA+PROBE: NOT DETECTED

## 2020-05-15 ENCOUNTER — APPOINTMENT (OUTPATIENT)
Dept: RADIOLOGY | Facility: CLINIC | Age: 55
End: 2020-05-15
Payer: COMMERCIAL

## 2020-05-15 ENCOUNTER — OFFICE VISIT (OUTPATIENT)
Dept: OBGYN CLINIC | Facility: CLINIC | Age: 55
End: 2020-05-15
Payer: COMMERCIAL

## 2020-05-15 VITALS
HEIGHT: 75 IN | WEIGHT: 250 LBS | BODY MASS INDEX: 31.08 KG/M2 | TEMPERATURE: 98.8 F | SYSTOLIC BLOOD PRESSURE: 126 MMHG | DIASTOLIC BLOOD PRESSURE: 84 MMHG | HEART RATE: 86 BPM

## 2020-05-15 DIAGNOSIS — M25.561 RIGHT KNEE PAIN, UNSPECIFIED CHRONICITY: ICD-10-CM

## 2020-05-15 DIAGNOSIS — L03.116 CELLULITIS OF LEFT THIGH: ICD-10-CM

## 2020-05-15 DIAGNOSIS — M17.11 PRIMARY OSTEOARTHRITIS OF RIGHT KNEE: Primary | ICD-10-CM

## 2020-05-15 PROCEDURE — 1036F TOBACCO NON-USER: CPT | Performed by: ORTHOPAEDIC SURGERY

## 2020-05-15 PROCEDURE — 3008F BODY MASS INDEX DOCD: CPT | Performed by: ORTHOPAEDIC SURGERY

## 2020-05-15 PROCEDURE — 2022F DILAT RTA XM EVC RTNOPTHY: CPT | Performed by: ORTHOPAEDIC SURGERY

## 2020-05-15 PROCEDURE — 73562 X-RAY EXAM OF KNEE 3: CPT

## 2020-05-15 PROCEDURE — 99214 OFFICE O/P EST MOD 30 MIN: CPT | Performed by: ORTHOPAEDIC SURGERY

## 2020-05-15 PROCEDURE — 20610 DRAIN/INJ JOINT/BURSA W/O US: CPT | Performed by: ORTHOPAEDIC SURGERY

## 2020-05-15 RX ORDER — LIDOCAINE HYDROCHLORIDE 10 MG/ML
4 INJECTION, SOLUTION INFILTRATION; PERINEURAL
Status: COMPLETED | OUTPATIENT
Start: 2020-05-15 | End: 2020-05-15

## 2020-05-15 RX ORDER — DEXAMETHASONE SODIUM PHOSPHATE 100 MG/10ML
40 INJECTION INTRAMUSCULAR; INTRAVENOUS
Status: COMPLETED | OUTPATIENT
Start: 2020-05-15 | End: 2020-05-15

## 2020-05-15 RX ORDER — CEPHALEXIN 500 MG/1
500 CAPSULE ORAL EVERY 6 HOURS SCHEDULED
Qty: 28 CAPSULE | Refills: 0 | Status: SHIPPED | OUTPATIENT
Start: 2020-05-15 | End: 2020-05-22

## 2020-05-15 RX ADMIN — LIDOCAINE HYDROCHLORIDE 4 ML: 10 INJECTION, SOLUTION INFILTRATION; PERINEURAL at 15:45

## 2020-05-15 RX ADMIN — DEXAMETHASONE SODIUM PHOSPHATE 40 MG: 100 INJECTION INTRAMUSCULAR; INTRAVENOUS at 15:45

## 2020-07-02 DIAGNOSIS — E78.2 MIXED HYPERLIPIDEMIA: ICD-10-CM

## 2020-07-02 RX ORDER — ATORVASTATIN CALCIUM 40 MG/1
TABLET, FILM COATED ORAL
Qty: 90 TABLET | Refills: 0 | Status: SHIPPED | OUTPATIENT
Start: 2020-07-02 | End: 2020-11-23 | Stop reason: SDUPTHER

## 2020-07-07 DIAGNOSIS — G47.00 INSOMNIA, UNSPECIFIED TYPE: ICD-10-CM

## 2020-07-08 RX ORDER — ZOLPIDEM TARTRATE 10 MG/1
TABLET ORAL
Qty: 30 TABLET | Refills: 0 | Status: SHIPPED | OUTPATIENT
Start: 2020-07-08 | End: 2022-01-27 | Stop reason: SDUPTHER

## 2020-10-13 ENCOUNTER — OFFICE VISIT (OUTPATIENT)
Dept: OBGYN CLINIC | Facility: CLINIC | Age: 55
End: 2020-10-13
Payer: COMMERCIAL

## 2020-10-13 VITALS
HEART RATE: 97 BPM | SYSTOLIC BLOOD PRESSURE: 125 MMHG | BODY MASS INDEX: 31.25 KG/M2 | DIASTOLIC BLOOD PRESSURE: 80 MMHG | HEIGHT: 75 IN

## 2020-10-13 DIAGNOSIS — M17.11 PRIMARY OSTEOARTHRITIS OF RIGHT KNEE: Primary | ICD-10-CM

## 2020-10-13 PROCEDURE — 1036F TOBACCO NON-USER: CPT | Performed by: ORTHOPAEDIC SURGERY

## 2020-10-13 PROCEDURE — 99214 OFFICE O/P EST MOD 30 MIN: CPT | Performed by: ORTHOPAEDIC SURGERY

## 2020-10-13 PROCEDURE — 20610 DRAIN/INJ JOINT/BURSA W/O US: CPT | Performed by: ORTHOPAEDIC SURGERY

## 2020-10-13 RX ORDER — LIDOCAINE HYDROCHLORIDE 10 MG/ML
4 INJECTION, SOLUTION INFILTRATION; PERINEURAL
Status: COMPLETED | OUTPATIENT
Start: 2020-10-13 | End: 2020-10-13

## 2020-10-13 RX ORDER — DEXAMETHASONE SODIUM PHOSPHATE 100 MG/10ML
40 INJECTION INTRAMUSCULAR; INTRAVENOUS
Status: COMPLETED | OUTPATIENT
Start: 2020-10-13 | End: 2020-10-13

## 2020-10-13 RX ADMIN — LIDOCAINE HYDROCHLORIDE 4 ML: 10 INJECTION, SOLUTION INFILTRATION; PERINEURAL at 10:40

## 2020-10-13 RX ADMIN — DEXAMETHASONE SODIUM PHOSPHATE 40 MG: 100 INJECTION INTRAMUSCULAR; INTRAVENOUS at 10:40

## 2020-11-23 DIAGNOSIS — E78.2 MIXED HYPERLIPIDEMIA: ICD-10-CM

## 2020-11-23 RX ORDER — ATORVASTATIN CALCIUM 40 MG/1
40 TABLET, FILM COATED ORAL DAILY
Qty: 90 TABLET | Refills: 0 | Status: SHIPPED | OUTPATIENT
Start: 2020-11-23 | End: 2020-11-23 | Stop reason: SDUPTHER

## 2020-11-23 RX ORDER — ATORVASTATIN CALCIUM 40 MG/1
40 TABLET, FILM COATED ORAL DAILY
Qty: 90 TABLET | Refills: 0 | Status: SHIPPED | OUTPATIENT
Start: 2020-11-23 | End: 2021-05-11

## 2021-03-30 ENCOUNTER — CONSULT (OUTPATIENT)
Dept: SURGERY | Facility: CLINIC | Age: 56
End: 2021-03-30
Payer: COMMERCIAL

## 2021-03-30 VITALS
TEMPERATURE: 96.6 F | DIASTOLIC BLOOD PRESSURE: 80 MMHG | HEART RATE: 102 BPM | BODY MASS INDEX: 35.19 KG/M2 | SYSTOLIC BLOOD PRESSURE: 136 MMHG | WEIGHT: 283 LBS | HEIGHT: 75 IN

## 2021-03-30 DIAGNOSIS — R93.2 ABNORMAL GALLBLADDER ULTRASOUND: Primary | ICD-10-CM

## 2021-03-30 DIAGNOSIS — N28.1 RENAL CYST, RIGHT: ICD-10-CM

## 2021-03-30 PROCEDURE — 1036F TOBACCO NON-USER: CPT | Performed by: SURGERY

## 2021-03-30 PROCEDURE — 99213 OFFICE O/P EST LOW 20 MIN: CPT | Performed by: SURGERY

## 2021-03-30 PROCEDURE — 3008F BODY MASS INDEX DOCD: CPT | Performed by: SURGERY

## 2021-03-30 NOTE — PROGRESS NOTES
[facility] History and Physical Note:    Assessment and Plan:   1  Abdominal wall strain -   Diet, weight loss, abdominal wall strengthening exercises  2  History of abnormal gallbladder ultrasound -  MRCP ordered as previously recommended    3  History of complex right renal cyst -  MRI ordered as previously recommended    Patient to obtain MRI and MRCP as previously ordered and follow-up with me  Will then place urology consult if indicated  I urged him that he must follow through with these studies to complete his workup  Chief Complaint:   "I am here for the pain again"    HPI  Patient is here for pain just to the right of his midline incision  He is status post complex ventral hernia repair Dec 2019 with biologic mesh placed in the retro rectus space  He was seen in early 2020 for some vague right-sided abdominal discomfort  A right upper quadrant ultrasound revealed some gallbladder sludge but no stones, an 8 millimeter common bile duct, and a right renal cyst   An MRI and MRCP was ordered and he was to follow-up with his urologist regarding his complex right renal cyst    He was again seen 2 weeks after this appointment, but had not yet obtained his MRI    The MRI has still not been performed and he has not seen his urologist     PMH:  Past Medical History:   Diagnosis Date    Anxiety     Colon polyp     Diabetes mellitus ([address] )     GERD (gastroesophageal reflux disease)     Hernia of abdominal wall     Hyperlipidemia        PSH:  Past Surgical History:   Procedure Laterality Date    COLON SURGERY  2013    HEMICOLECTOMY      INCISIONAL HERNIA REPAIR N/A 12/27/2019    Procedure: REPAIR HERNIA INCISIONAL;  Surgeon: Kerrie Cooks, MD;  Location: [address] [place] [address];  Service: General    DE KNEE SCOPE,MED/LAT MENISECTOMY Left 10/17/2019    Procedure: ARTHROSCOPY KNEE MEDIAL MENISCECTOMY;  Surgeon: Bari Earl MD;  Location: [facility];  Service: Orthopedics    SKIN CANCER EXCISION      WISDOM TOOTH EXTRACTION         Home Meds:  Current Outpatient Medications on File Prior to Visit   Medication Sig Dispense Refill    allopurinol (ZYLOPRIM) 300 mg tablet       ALPRAZolam (XANAX) 0 25 mg tablet Take 0 25 mg by mouth 4 (four) times a day as needed for anxiety      atorvastatin (LIPITOR) 40 mg tablet Take 1 tablet (40 mg total) by mouth daily 90 tablet 0    esomeprazole (NexIUM) 10 MG packet Take 30 mg by mouth daily at bedtime       FLUoxetine (PROzac) 20 mg capsule Take 1 capsule (20 mg total) by mouth daily 90 capsule 1    metFORMIN (GLUCOPHAGE) 500 mg tablet Take 1 tablet (500 mg total) by mouth daily with breakfast 90 tablet 3    Omega-3 Fatty Acids (FISH OIL) 1,000 mg Take 1,000 mg by mouth daily      tadalafil (CIALIS) 2 5 MG tablet Take 1 tablet (2 5 mg total) by mouth daily 30 tablet 3    VASCEPA 1 g CAPS       zolpidem (AMBIEN) 10 mg tablet TAKE ONE TABLET BY MOUTH EVERY DAY AT BEDTIME AS NEEDED FOR SLEEP 30 tablet 0     No current facility-administered medications on file prior to visit          Allergies:  No Known Allergies    Social Hx:  Social History     Socioeconomic History    Marital status: /Civil Union     Spouse name: Not on file    Number of children: Not on file    Years of education: Not on file    Highest education level: Not on file   Occupational History    Not on file   Social Needs    Financial resource strain: Not on file    Food insecurity     Worry: Not on file     Inability: Not on file   [employer] needs     Medical: Not on file     Non-medical: Not on file   Tobacco Use    Smoking status: Never Smoker    Smokeless tobacco: Never Used   Substance and Sexual Activity    Alcohol use: Never     Frequency: Never    Drug use: Never    Sexual activity: Not on file   Lifestyle    Physical activity     Days per week: Not on file     Minutes per session: Not on file    Stress: Not on file   Relationships    Social connections     Talks on phone: Not on file     Gets together: Not on file     Attends Hoahaoism service: Not on file     Active member of club or organization: Not on file     Attends meetings of clubs or organizations: Not on file     Relationship status: Not on file    Intimate partner violence     Fear of current or ex partner: Not on file     Emotionally abused: Not on file     Physically abused: Not on file     Forced sexual activity: Not on file   Other Topics Concern    Not on file   Social History Narrative    Not on file        Family Hx:    Family History   Problem Relation Age of Onset    Diabetes Mother     Lung cancer Mother     Hyperlipidemia Father     Hypertension Father     Hyperlipidemia Brother     Hypertension Brother          Review of Systems   Constitutional:         Patient reports 30 pound weight gain from decreased activity   Respiratory: Negative  Cardiovascular: Negative  Gastrointestinal: Negative  Genitourinary: Negative  Musculoskeletal:         See HPI and past medical history   Neurological: Negative  There were no vitals taken for this visit  Physical Exam  Constitutional:       General: He is not in acute distress  Appearance: Normal appearance  Eyes:      Pupils: Pupils are equal, round, and reactive to light  Neck:      Musculoskeletal: Normal range of motion and neck supple  Cardiovascular:      Rate and Rhythm: Normal rate and regular rhythm  Pulmonary:      Effort: Pulmonary effort is normal  No respiratory distress  Abdominal:      General: Abdomen is flat  There is no distension  Palpations: Abdomen is soft  Comments:  Patient has well-healed midline incision  No obvious recurrent incisional hernia  Patient points to right mid abdomen near junction of external oblique and linea semi lunar naris as the location of his discomfort  Skin:     General: Skin is warm and dry     Neurological:      General: No focal deficit present  Mental Status: He is alert and oriented to person, place, and time     Psychiatric:         Mood and Affect: Mood normal          Behavior: Behavior normal          Pertinent labs reviewed    Pertinent images and available reads personally reviewed    Pertinent notes reviewed       Lizet Black MD [id number]  [phone]

## 2021-03-31 ENCOUNTER — IMMUNIZATIONS (OUTPATIENT)
Dept: FAMILY MEDICINE CLINIC | Facility: HOSPITAL | Age: 56
End: 2021-03-31

## 2021-03-31 DIAGNOSIS — Z23 ENCOUNTER FOR IMMUNIZATION: Primary | ICD-10-CM

## 2021-03-31 PROCEDURE — 91301 SARS-COV-2 / COVID-19 MRNA VACCINE (MODERNA) 100 MCG: CPT

## 2021-03-31 PROCEDURE — 0011A SARS-COV-2 / COVID-19 MRNA VACCINE (MODERNA) 100 MCG: CPT

## 2021-04-02 LAB — HBA1C MFR BLD HPLC: 7.6 %

## 2021-04-15 ENCOUNTER — HOSPITAL ENCOUNTER (OUTPATIENT)
Dept: RADIOLOGY | Facility: HOSPITAL | Age: 56
Discharge: HOME | End: 2021-04-15
Attending: SURGERY
Payer: COMMERCIAL

## 2021-04-15 DIAGNOSIS — N28.1 RENAL CYST, RIGHT: ICD-10-CM

## 2021-04-15 DIAGNOSIS — R93.2 ABNORMAL GALLBLADDER ULTRASOUND: ICD-10-CM

## 2021-04-15 PROCEDURE — G1004 CDSM NDSC: HCPCS

## 2021-04-15 PROCEDURE — 74183 MRI ABD W/O CNTR FLWD CNTR: CPT

## 2021-04-15 PROCEDURE — 76376 3D RENDER W/INTRP POSTPROCES: CPT

## 2021-04-15 PROCEDURE — A9585 GADOBUTROL INJECTION: HCPCS | Performed by: SURGERY

## 2021-04-15 RX ADMIN — GADOBUTROL 13 ML: 604.72 INJECTION INTRAVENOUS at 16:43

## 2021-04-16 ENCOUNTER — TELEPHONE (OUTPATIENT)
Dept: FAMILY MEDICINE CLINIC | Facility: CLINIC | Age: 56
End: 2021-04-16

## 2021-04-16 NOTE — TELEPHONE ENCOUNTER
Called patient to schedule him for a CPE  Patient said he would call back to schedule     August Pena MA

## 2021-04-19 ENCOUNTER — OFFICE VISIT (OUTPATIENT)
Dept: SURGERY | Facility: CLINIC | Age: 56
End: 2021-04-19
Payer: COMMERCIAL

## 2021-04-19 VITALS
WEIGHT: 280 LBS | BODY MASS INDEX: 34.82 KG/M2 | DIASTOLIC BLOOD PRESSURE: 84 MMHG | TEMPERATURE: 96.2 F | HEART RATE: 71 BPM | HEIGHT: 75 IN | SYSTOLIC BLOOD PRESSURE: 140 MMHG

## 2021-04-19 DIAGNOSIS — Z71.2 ENCOUNTER TO DISCUSS TEST RESULTS: Primary | ICD-10-CM

## 2021-04-19 PROCEDURE — 99212 OFFICE O/P EST SF 10 MIN: CPT | Performed by: SURGERY

## 2021-04-19 PROCEDURE — 3008F BODY MASS INDEX DOCD: CPT | Performed by: SURGERY

## 2021-04-19 NOTE — PROGRESS NOTES
Patient is here for follow-up of abdominal MRI  He is status post complex ventral hernia repair Dec 2019 with biologic mesh placed in the retro rectus space      He was seen in early 2020 for some vague right-sided abdominal discomfort  A right upper quadrant ultrasound revealed some gallbladder sludge but no stones, an 8 millimeter common bile duct, and a right renal cyst   An MRI and MRCP was ordered and he was to follow-up with his urologist regarding his complex right renal cyst    He was again seen 2 weeks after this appointment, but had not yet obtained his MRI  He has since then obtain the MRI on 15 April 2021:     IMPRESSION:     1  1 4 x 1 0 cm right interpolar renal lesion appears to correspond to a small cyst   Although it is suboptimally visualized on the T2-weighted sequences (which is a bit unusual for cysts), it is well visualized on postcontrast imaging and without   suspicious features, likely benign  Consider 1 additional follow-up ultrasound in 1 year      2  Normal MRCP  Assessment and Plan:    1  History of abnormal gallbladder ultrasound -  MRCP  reveals no gallbladder or biliary duct issues  No further workup needed  3  History of complex right renal cyst -  MRI ordered  with likely cyst identified and recommendation for follow-up ultrasound in 1 year    Follow-up with PCP for this

## 2021-04-26 DIAGNOSIS — E11.65 TYPE 2 DIABETES MELLITUS WITH HYPERGLYCEMIA, WITHOUT LONG-TERM CURRENT USE OF INSULIN (HCC): ICD-10-CM

## 2021-05-03 ENCOUNTER — IMMUNIZATIONS (OUTPATIENT)
Dept: FAMILY MEDICINE CLINIC | Facility: HOSPITAL | Age: 56
End: 2021-05-03

## 2021-05-03 DIAGNOSIS — Z23 ENCOUNTER FOR IMMUNIZATION: Primary | ICD-10-CM

## 2021-05-03 PROCEDURE — 91301 SARS-COV-2 / COVID-19 MRNA VACCINE (MODERNA) 100 MCG: CPT

## 2021-05-03 PROCEDURE — 0012A SARS-COV-2 / COVID-19 MRNA VACCINE (MODERNA) 100 MCG: CPT

## 2021-05-11 DIAGNOSIS — E78.2 MIXED HYPERLIPIDEMIA: ICD-10-CM

## 2021-05-11 RX ORDER — ATORVASTATIN CALCIUM 40 MG/1
TABLET, FILM COATED ORAL
Qty: 30 TABLET | Refills: 2 | Status: SHIPPED | OUTPATIENT
Start: 2021-05-11 | End: 2021-08-02

## 2021-05-29 NOTE — PROGRESS NOTES
FAMILY PRACTICE HEALTH MAINTENANCE OFFICE VISIT  [facility]    NAME: David Dueñas  AGE: 50 y o  SEX: male  : 1965     DATE: 6/3/2021    Assessment and Plan     1  Well adult exam    2  Type 2 diabetes mellitus with hyperglycemia, without long-term current use of insulin [facility])  Assessment & Plan:    Lab Results   Component Value Date    HGBA1C 5 7 (H) 2020     He has already had labwork done and increased his metformin to bid in May  He will follow up in 3-4 months after labwork  Advised on need to restart exercise and dietary efforts  Discussed need for good foot and eye care  Orders:  -     metFORMIN (GLUCOPHAGE) 500 mg tablet; Take 2 tablets (1,000 mg total) by mouth daily with breakfast  -     CBC and differential; Future  -     Comprehensive metabolic panel; Future  -     Lipid panel; Future  -     HEMOGLOBIN A1C W/ EAG ESTIMATION; Future  -     Microalbumin / creatinine urine ratio  -     CBC and differential  -     Comprehensive metabolic panel  -     Lipid panel  -     HEMOGLOBIN A1C W/ EAG ESTIMATION    3  Mixed hyperlipidemia  -     CBC and differential; Future  -     Comprehensive metabolic panel; Future  -     Lipid panel; Future  -     CBC and differential  -     Comprehensive metabolic panel  -     Lipid panel    4  Need for vaccination  -     TDAP VACCINE GREATER THAN OR EQUAL TO 6YO IM  -     Zoster Vaccine Recombinant IM    5  BMI 34 0-34 9,adult      · Patient Counseling:   · Nutrition: Stressed importance of a well balanced diet, moderation of sodium/saturated fat, caloric balance and sufficient intake of fiber  · Exercise: Stressed the importance of regular exercise with a goal of 150 minutes per week  · Dental Health: Discussed daily flossing and brushing and regular dental visits     · Immunizations reviewed: See Orders  · Discussed benefits of:  Colon Cancer Screening, Prostate Cancer Screening  and Screening labs     BMI Counseling: Body mass index is 34 75 kg/m²  Discussed with patient's BMI with him  The BMI is above normal  Nutrition recommendations include reducing portion sizes and decreasing overall calorie intake  Exercise recommendations include moderate aerobic physical activity for 150 minutes/week  Return in about 6 months (around 12/3/2021)  Chief Complaint     Chief Complaint   Patient presents with    Physical Exam     [id number]       History of Present Illness     Here for CPE  Had labs done at another doctor and brings in a copy for review  A1C has increased from 5's to 7 6  He increased his metformin to BID after this  Well Adult Physical   Patient here for a comprehensive physical exam       Diet and Physical Activity  Diet: well balanced diet  Exercise: rarely      Depression Screen  PHQ-9 Depression Screening    PHQ-9:   Frequency of the following problems over the past two weeks:      Little interest or pleasure in doing things: 3 - nearly every day  Feeling down, depressed, or hopeless: 0 - not at all  Trouble falling or staying asleep, or sleeping too much: 2 - more than half the days  Feeling tired or having little energy: 0 - not at all  Poor appetite or overeating: 3 - nearly every day  Feeling bad about yourself - or that you are a failure or have let yourself or your family down: 2 - more than half the days  Trouble concentrating on things, such as reading the newspaper or watching television: 2 - more than half the days  Moving or speaking so slowly that other people could have noticed   Or the opposite - being so fidgety or restless that you have been moving around a lot more than usual: 3 - nearly every day  Thoughts that you would be better off dead, or of hurting yourself in some way: 0 - not at all  PHQ-2 Score: 3  PHQ-9 Score: 15          General Health  Hearing: Normal:  bilateral  Vision: no vision problems  Dental: regular dental visits    Reproductive Health  No issues  and Denies nocturia      The following portions of the patient's history were reviewed and updated as appropriate: allergies, current medications, past family history, past medical history, past social history, past surgical history and problem list     Review of Systems     Review of Systems   Constitutional: Negative  HENT: Negative  Eyes: Negative  Respiratory: Negative  Cardiovascular: Negative  Gastrointestinal: Negative  Endocrine: Negative  Genitourinary: Negative  Musculoskeletal: Negative  Skin: Negative  Allergic/Immunologic: Negative  Neurological: Negative  Hematological: Negative  Psychiatric/Behavioral: Negative          Past Medical History     Past Medical History:   Diagnosis Date    Anxiety     Colon polyp     Diabetes mellitus ([address] )     GERD (gastroesophageal reflux disease)     Hernia of abdominal wall     Hyperlipidemia        Past Surgical History     Past Surgical History:   Procedure Laterality Date    COLON SURGERY  2013    HEMICOLECTOMY      INCISIONAL HERNIA REPAIR N/A 12/27/2019    Procedure: [address];  Surgeon: Dee Estrada MD;  Location: [facility];  Service: General    NM KNEE SCOPE,MED/LAT MENISECTOMY Left 10/17/2019    Procedure: ARTHROSCOPY KNEE MEDIAL MENISCECTOMY;  Surgeon: Yokasta Candelario MD;  Location: [facility];  Service: Orthopedics    SKIN CANCER EXCISION      WISDOM TOOTH EXTRACTION         Social History     Social History     Socioeconomic History    Marital status: /Civil Union     Spouse name: None    Number of children: None    Years of education: None    Highest education level: None   Occupational History    None   Social Needs    Financial resource strain: None    Food insecurity     Worry: None     Inability: None    Transportation needs     Medical: None     Non-medical: None   Tobacco Use    Smoking status: Never Smoker    Smokeless tobacco: Never Used   Substance and Sexual Activity  Alcohol use: Never     Frequency: Never    Drug use: Never    Sexual activity: None   Lifestyle    Physical activity     Days per week: None     Minutes per session: None    Stress: None   Relationships    Social connections     Talks on phone: None     Gets together: None     Attends Anabaptist service: None     Active member of club or organization: None     Attends meetings of clubs or organizations: None     Relationship status: None    Intimate partner violence     Fear of current or ex partner: None     Emotionally abused: None     Physically abused: None     Forced sexual activity: None   Other Topics Concern    None   Social History Narrative    None       Family History     Family History   Problem Relation Age of Onset    Diabetes Mother     Lung cancer Mother     Hyperlipidemia Father     Hypertension Father     Hyperlipidemia Brother     Hypertension Brother        Current Medications       Current Outpatient Medications:     allopurinol (ZYLOPRIM) 300 mg tablet, , Disp: , Rfl:     ALPRAZolam (XANAX) 0 25 mg tablet, Take 0 25 mg by mouth 4 (four) times a day as needed for anxiety, Disp: , Rfl:     atorvastatin (LIPITOR) 40 mg tablet, TAKE 1 TABLET BY MOUTH EVERY DAY, Disp: 30 tablet, Rfl: 2    esomeprazole (NexIUM) 10 MG packet, Take 30 mg by mouth daily at bedtime , Disp: , Rfl:     FLUoxetine (PROzac) 20 mg capsule, Take 1 capsule (20 mg total) by mouth daily, Disp: 90 capsule, Rfl: 1    metFORMIN (GLUCOPHAGE) 500 mg tablet, Take 2 tablets (1,000 mg total) by mouth daily with breakfast, Disp: 90 tablet, Rfl: 3    niacin (NIASPAN) 500 mg CR tablet, Take 500 mg by mouth daily, Disp: , Rfl:     Omega-3 Fatty Acids (FISH OIL) 1,000 mg, Take 1,000 mg by mouth daily, Disp: , Rfl:     tadalafil (CIALIS) 2 5 MG tablet, Take 1 tablet (2 5 mg total) by mouth daily, Disp: 30 tablet, Rfl: 3    testosterone cypionate (DEPO-TESTOSTERONE) 200 mg/mL SOLN, Inject 5 mL into a muscle 2 (two) times a week, Disp: , Rfl:     VASCEPA 1 g CAPS, , Disp: , Rfl:     zolpidem (AMBIEN) 10 mg tablet, TAKE ONE TABLET BY MOUTH EVERY DAY AT BEDTIME AS NEEDED FOR SLEEP, Disp: 30 tablet, Rfl: 0     Allergies     No Known Allergies    Objective     /80   Pulse 102   Temp 98 5 °F (36 9 °C)   Resp 16   Ht 6' 3" (1 905 m)   Wt 126 kg (278 lb)   SpO2 96%   BMI 34 75 kg/m²      Physical Exam  Constitutional:       General: He is not in acute distress  Appearance: He is well-developed  HENT:      Head: Normocephalic and atraumatic  Right Ear: External ear normal       Left Ear: External ear normal       Nose: Nose normal    Eyes:      Conjunctiva/sclera: Conjunctivae normal       Pupils: Pupils are equal, round, and reactive to light  Neck:      Musculoskeletal: Normal range of motion and neck supple  Thyroid: No thyromegaly  Cardiovascular:      Rate and Rhythm: Normal rate and regular rhythm  Heart sounds: No murmur  No friction rub  No gallop  Pulmonary:      Effort: Pulmonary effort is normal       Breath sounds: Normal breath sounds  No wheezing or rales  Abdominal:      General: Bowel sounds are normal  There is no distension  Palpations: Abdomen is soft  Tenderness: There is no abdominal tenderness  Genitourinary:     Comments: PSA reviewed; exam deferred by patient  Musculoskeletal: Normal range of motion  General: No tenderness or deformity  Lymphadenopathy:      Cervical: No cervical adenopathy  Skin:     General: Skin is dry  Findings: No rash  Neurological:      Mental Status: He is alert and oriented to person, place, and time  Cranial Nerves: No cranial nerve deficit  Motor: No abnormal muscle tone  Coordination: Coordination normal       Deep Tendon Reflexes: Reflexes are normal and symmetric  Reflexes normal    Psychiatric:         Behavior: Behavior normal          Thought Content:  Thought content normal  Judgment: Judgment normal             Visual Acuity Screening    Right eye Left eye Both eyes   Without correction:      With correction: 20/20 20/30 20/20           Marline Swanson

## 2021-06-03 ENCOUNTER — OFFICE VISIT (OUTPATIENT)
Dept: FAMILY MEDICINE CLINIC | Facility: CLINIC | Age: 56
End: 2021-06-03
Payer: COMMERCIAL

## 2021-06-03 VITALS
TEMPERATURE: 98.5 F | SYSTOLIC BLOOD PRESSURE: 120 MMHG | HEART RATE: 102 BPM | DIASTOLIC BLOOD PRESSURE: 80 MMHG | HEIGHT: 75 IN | RESPIRATION RATE: 16 BRPM | OXYGEN SATURATION: 96 % | WEIGHT: 278 LBS | BODY MASS INDEX: 34.57 KG/M2

## 2021-06-03 DIAGNOSIS — Z23 NEED FOR VACCINATION: ICD-10-CM

## 2021-06-03 DIAGNOSIS — E78.2 MIXED HYPERLIPIDEMIA: ICD-10-CM

## 2021-06-03 DIAGNOSIS — Z00.00 WELL ADULT EXAM: Primary | ICD-10-CM

## 2021-06-03 DIAGNOSIS — E11.65 TYPE 2 DIABETES MELLITUS WITH HYPERGLYCEMIA, WITHOUT LONG-TERM CURRENT USE OF INSULIN (HCC): ICD-10-CM

## 2021-06-03 PROCEDURE — 99396 PREV VISIT EST AGE 40-64: CPT | Performed by: INTERNAL MEDICINE

## 2021-06-03 PROCEDURE — 90472 IMMUNIZATION ADMIN EACH ADD: CPT

## 2021-06-03 PROCEDURE — 90715 TDAP VACCINE 7 YRS/> IM: CPT

## 2021-06-03 PROCEDURE — 1036F TOBACCO NON-USER: CPT | Performed by: INTERNAL MEDICINE

## 2021-06-03 PROCEDURE — 90750 HZV VACC RECOMBINANT IM: CPT

## 2021-06-03 PROCEDURE — 90471 IMMUNIZATION ADMIN: CPT

## 2021-06-03 PROCEDURE — 3725F SCREEN DEPRESSION PERFORMED: CPT | Performed by: INTERNAL MEDICINE

## 2021-06-03 PROCEDURE — 3008F BODY MASS INDEX DOCD: CPT | Performed by: INTERNAL MEDICINE

## 2021-06-03 RX ORDER — NIACIN 500 MG/1
500 TABLET, EXTENDED RELEASE ORAL DAILY
COMMUNITY
Start: 2021-04-26

## 2021-06-03 RX ORDER — TESTOSTERONE CYPIONATE 200 MG/ML
5 INJECTION INTRAMUSCULAR 2 TIMES WEEKLY
COMMUNITY
Start: 2021-04-26

## 2021-06-03 NOTE — ASSESSMENT & PLAN NOTE
Lab Results   Component Value Date    HGBA1C 5 7 (H) 02/11/2020     He has already had labwork done and increased his metformin to bid in May  He will follow up in 3-4 months after labwork  Advised on need to restart exercise and dietary efforts  Discussed need for good foot and eye care

## 2021-08-01 DIAGNOSIS — E78.2 MIXED HYPERLIPIDEMIA: ICD-10-CM

## 2021-08-02 RX ORDER — ATORVASTATIN CALCIUM 40 MG/1
TABLET, FILM COATED ORAL
Qty: 30 TABLET | Refills: 2 | Status: SHIPPED | OUTPATIENT
Start: 2021-08-02 | End: 2022-01-27 | Stop reason: SDUPTHER

## 2021-09-22 LAB
LEFT EYE DIABETIC RETINOPATHY: NORMAL
RIGHT EYE DIABETIC RETINOPATHY: NORMAL

## 2021-10-12 ENCOUNTER — OFFICE VISIT (OUTPATIENT)
Dept: OBGYN CLINIC | Facility: CLINIC | Age: 56
End: 2021-10-12
Payer: COMMERCIAL

## 2021-10-12 VITALS
BODY MASS INDEX: 34.44 KG/M2 | WEIGHT: 277 LBS | DIASTOLIC BLOOD PRESSURE: 82 MMHG | HEART RATE: 75 BPM | HEIGHT: 75 IN | SYSTOLIC BLOOD PRESSURE: 141 MMHG

## 2021-10-12 DIAGNOSIS — S56.519A STRAIN OF EXTENSOR MUSCLE AT FOREARM LEVEL: Primary | ICD-10-CM

## 2021-10-12 PROCEDURE — 99213 OFFICE O/P EST LOW 20 MIN: CPT | Performed by: ORTHOPAEDIC SURGERY

## 2021-10-12 PROCEDURE — 3008F BODY MASS INDEX DOCD: CPT | Performed by: ORTHOPAEDIC SURGERY

## 2021-10-12 PROCEDURE — 1036F TOBACCO NON-USER: CPT | Performed by: ORTHOPAEDIC SURGERY

## 2021-10-12 RX ORDER — ANASTROZOLE 1 MG/1
TABLET ORAL
COMMUNITY
Start: 2021-09-24

## 2021-11-04 ENCOUNTER — TELEPHONE (OUTPATIENT)
Dept: FAMILY MEDICINE CLINIC | Facility: CLINIC | Age: 56
End: 2021-11-04

## 2021-11-09 ENCOUNTER — CLINICAL SUPPORT (OUTPATIENT)
Dept: FAMILY MEDICINE CLINIC | Facility: CLINIC | Age: 56
End: 2021-11-09
Payer: COMMERCIAL

## 2021-11-09 ENCOUNTER — TELEPHONE (OUTPATIENT)
Dept: ADMINISTRATIVE | Facility: OTHER | Age: 56
End: 2021-11-09

## 2021-11-09 DIAGNOSIS — Z23 NEED FOR SHINGLES VACCINE: Primary | ICD-10-CM

## 2021-11-09 PROCEDURE — 90471 IMMUNIZATION ADMIN: CPT

## 2021-11-09 PROCEDURE — 90750 HZV VACC RECOMBINANT IM: CPT

## 2022-01-26 NOTE — PROGRESS NOTES
Assessment/Plan:    1  Type 2 diabetes mellitus with hyperglycemia, without long-term current use of insulin [facility])  Assessment & Plan:    Lab Results   Component Value Date    HGBA1C 7 6 04/02/2021     He has labs from his weight loss specialist and last A1C was 6 4  Will continue current medications  Encouraged continued exercise and weight loss efforts  Discussed need for good foot and eye care  Orders:  -     Microalbumin / creatinine urine ratio  -     metFORMIN (GLUCOPHAGE) 500 mg tablet; Take 2 tablets (1,000 mg total) by mouth daily with breakfast    2  Mixed hyperlipidemia  Assessment & Plan:  Continue atorvastatin at current dosage  Reviewed labs with patient  Continue low fat diet and exercise  Orders:  -     atorvastatin (LIPITOR) 40 mg tablet; Take 1 tablet (40 mg total) by mouth daily    3  Depression, unspecified depression type  Assessment & Plan:  Continue fluoxetine  Again, asked patient to use alprazolam sparingly   was checked and has not had inappropriate refills  4  Insomnia, unspecified type  Assessment & Plan:  Likely due to divorce proceedings; he has used zolpidem infrequently and this was renewed   was checked and is appropriate  Advised patient not to use regularly to decrease risk of habituation  Orders:  -     ALPRAZolam (XANAX) 0 25 mg tablet; Take 1 tablet (0 25 mg total) by mouth daily at bedtime as needed for anxiety  -     zolpidem (AMBIEN) 10 mg tablet; Take 1 tablet (10 mg total) by mouth daily at bedtime as needed for sleep  -     Ambulatory Referral to Psychiatry; Future          There are no Patient Instructions on file for this visit  Return in about 6 months (around 7/27/2022)  Subjective:      Patient ID: Noe Chatman is a 49 y o  male  Chief Complaint   Patient presents with    Follow-up     6 month m[facility]       Here for a follow up of DM and other issues    He has been seeing someone for weight loss and testosterone and brings in his labs from that doctor  He did not have a microalbumin done  Denies hypoglycemia  Checks glucose frequently and fastings can be as low as 105  He has not checked post prandials and was asked to do so  Up to date with eye exam   He is going through a divorce and is asking for xanax and zolpidem  He has not been taking these regularly  I checked  and this concurs  He states he continues on fluoxetine and this is helping  The following portions of the patient's history were reviewed and updated as appropriate: allergies, current medications, past family history, past medical history, past social history, past surgical history and problem list     Review of Systems   Constitutional: Negative  Respiratory: Negative  Cardiovascular: Negative  Endocrine: Negative  Psychiatric/Behavioral: Positive for sleep disturbance  Negative for dysphoric mood  The patient is nervous/anxious            Current Outpatient Medications   Medication Sig Dispense Refill    ALPRAZolam (XANAX) 0 25 mg tablet Take 1 tablet (0 25 mg total) by mouth daily at bedtime as needed for anxiety 30 tablet 0    anastrozole (ARIMIDEX) 1 mg tablet       atorvastatin (LIPITOR) 40 mg tablet Take 1 tablet (40 mg total) by mouth daily 90 tablet 3    FLUoxetine (PROzac) 20 mg capsule Take 1 capsule (20 mg total) by mouth daily 90 capsule 1    metFORMIN (GLUCOPHAGE) 500 mg tablet Take 2 tablets (1,000 mg total) by mouth daily with breakfast 90 tablet 3    niacin (NIASPAN) 500 mg CR tablet Take 500 mg by mouth daily      Omega-3 Fatty Acids (FISH OIL) 1,000 mg Take 1,000 mg by mouth daily      testosterone cypionate (DEPO-TESTOSTERONE) 200 mg/mL SOLN Inject 5 mL into a muscle 2 (two) times a week      VASCEPA 1 g CAPS       zolpidem (AMBIEN) 10 mg tablet Take 1 tablet (10 mg total) by mouth daily at bedtime as needed for sleep 30 tablet 0    esomeprazole (NexIUM) 40 MG capsule       Ozempic, 1 MG/DOSE, 4 MG/3ML SOPN injection pen        No current facility-administered medications for this visit  Objective:    /84   Pulse 97   Temp 98 2 °F (36 8 °C)   Resp 16   Ht 6' 3" (1 905 m)   Wt 120 kg (265 lb)   SpO2 98%   BMI 33 12 kg/m²        Physical Exam  Constitutional:       Appearance: He is well-developed  Eyes:      Conjunctiva/sclera: Conjunctivae normal    Neck:      Thyroid: No thyromegaly  Vascular: No JVD  Cardiovascular:      Rate and Rhythm: Normal rate and regular rhythm  Pulses: no weak pulses          Dorsalis pedis pulses are 2+ on the right side and 2+ on the left side  Heart sounds: Normal heart sounds  No murmur heard  No friction rub  No gallop  Pulmonary:      Effort: Pulmonary effort is normal       Breath sounds: Normal breath sounds  No wheezing or rales  Abdominal:      General: Bowel sounds are normal  There is no distension  Palpations: Abdomen is soft  Tenderness: There is no abdominal tenderness  Musculoskeletal:      Cervical back: Neck supple  Feet:      Right foot:      Skin integrity: No ulcer, skin breakdown, erythema, warmth, callus or dry skin  Left foot:      Skin integrity: No ulcer, skin breakdown, erythema, warmth, callus or dry skin  Psychiatric:         Attention and Perception: Attention normal          Mood and Affect: Mood is anxious  Speech: Speech normal          Behavior: Behavior normal          Thought Content: Thought content normal          Judgment: Judgment normal          Patient's shoes and socks removed  Right Foot/Ankle   Right Foot Inspection  Skin Exam: skin normal and skin intact  No dry skin, no warmth, no callus, no erythema, no maceration, no abnormal color, no pre-ulcer, no ulcer and no callus  Toe Exam: ROM and strength within normal limits  Sensory   Monofilament testing: intact    Vascular  Capillary refills: < 3 seconds  The right DP pulse is 2+       Left Foot/Ankle  Left Foot Inspection  Skin Exam: skin normal and skin intact  No dry skin, no warmth, no erythema, no maceration, normal color, no pre-ulcer, no ulcer and no callus  Toe Exam: ROM and strength within normal limits  Sensory   Monofilament testing: intact    Vascular  Capillary refills: < 3 seconds  The left DP pulse is 2+       Assign Risk Category  No deformity present  No loss of protective sensation  No weak pulses  Risk: 0           Haider Forte MD

## 2022-01-27 ENCOUNTER — OFFICE VISIT (OUTPATIENT)
Dept: FAMILY MEDICINE CLINIC | Facility: CLINIC | Age: 57
End: 2022-01-27
Payer: COMMERCIAL

## 2022-01-27 VITALS
HEIGHT: 75 IN | DIASTOLIC BLOOD PRESSURE: 84 MMHG | OXYGEN SATURATION: 98 % | HEART RATE: 97 BPM | RESPIRATION RATE: 16 BRPM | TEMPERATURE: 98.2 F | SYSTOLIC BLOOD PRESSURE: 142 MMHG | BODY MASS INDEX: 32.95 KG/M2 | WEIGHT: 265 LBS

## 2022-01-27 DIAGNOSIS — E78.2 MIXED HYPERLIPIDEMIA: ICD-10-CM

## 2022-01-27 DIAGNOSIS — G47.00 INSOMNIA, UNSPECIFIED TYPE: ICD-10-CM

## 2022-01-27 DIAGNOSIS — E11.65 TYPE 2 DIABETES MELLITUS WITH HYPERGLYCEMIA, WITHOUT LONG-TERM CURRENT USE OF INSULIN (HCC): Primary | ICD-10-CM

## 2022-01-27 DIAGNOSIS — F32.A DEPRESSION, UNSPECIFIED DEPRESSION TYPE: ICD-10-CM

## 2022-01-27 PROBLEM — M10.9 GOUT: Status: ACTIVE | Noted: 2021-12-14

## 2022-01-27 PROBLEM — N40.1 BENIGN PROSTATIC HYPERPLASIA WITH NOCTURIA: Status: ACTIVE | Noted: 2021-12-14

## 2022-01-27 PROBLEM — M54.12 CERVICAL RADICULOPATHY: Status: ACTIVE | Noted: 2020-06-12

## 2022-01-27 PROBLEM — R35.1 BENIGN PROSTATIC HYPERPLASIA WITH NOCTURIA: Status: ACTIVE | Noted: 2021-12-14

## 2022-01-27 PROCEDURE — 1036F TOBACCO NON-USER: CPT | Performed by: INTERNAL MEDICINE

## 2022-01-27 PROCEDURE — 99214 OFFICE O/P EST MOD 30 MIN: CPT | Performed by: INTERNAL MEDICINE

## 2022-01-27 PROCEDURE — 3008F BODY MASS INDEX DOCD: CPT | Performed by: INTERNAL MEDICINE

## 2022-01-27 RX ORDER — ALPRAZOLAM 0.25 MG/1
0.25 TABLET ORAL
Qty: 30 TABLET | Refills: 0 | Status: SHIPPED | OUTPATIENT
Start: 2022-01-27

## 2022-01-27 RX ORDER — ATORVASTATIN CALCIUM 40 MG/1
40 TABLET, FILM COATED ORAL DAILY
Qty: 90 TABLET | Refills: 3 | Status: SHIPPED | OUTPATIENT
Start: 2022-01-27

## 2022-01-27 RX ORDER — SEMAGLUTIDE 1.34 MG/ML
INJECTION, SOLUTION SUBCUTANEOUS
COMMUNITY
Start: 2022-01-21

## 2022-01-27 RX ORDER — ESOMEPRAZOLE MAGNESIUM 40 MG/1
CAPSULE, DELAYED RELEASE ORAL
COMMUNITY
Start: 2022-01-24

## 2022-01-27 RX ORDER — ZOLPIDEM TARTRATE 10 MG/1
10 TABLET ORAL
Qty: 30 TABLET | Refills: 0 | Status: SHIPPED | OUTPATIENT
Start: 2022-01-27 | End: 2022-05-12

## 2022-01-28 NOTE — ASSESSMENT & PLAN NOTE
Likely due to divorce proceedings; he has used zolpidem infrequently and this was renewed   was checked and is appropriate  Advised patient not to use regularly to decrease risk of habituation

## 2022-01-28 NOTE — ASSESSMENT & PLAN NOTE
Continue fluoxetine  Again, asked patient to use alprazolam sparingly   was checked and has not had inappropriate refills

## 2022-01-28 NOTE — ASSESSMENT & PLAN NOTE
Continue atorvastatin at current dosage  Reviewed labs with patient  Continue low fat diet and exercise

## 2022-01-28 NOTE — ASSESSMENT & PLAN NOTE
Lab Results   Component Value Date    HGBA1C 7 6 04/02/2021     He has labs from his weight loss specialist and last A1C was 6 4  Will continue current medications  Encouraged continued exercise and weight loss efforts  Discussed need for good foot and eye care

## 2022-04-20 ENCOUNTER — PATIENT MESSAGE (OUTPATIENT)
Dept: FAMILY MEDICINE CLINIC | Facility: CLINIC | Age: 57
End: 2022-04-20

## 2022-04-20 ENCOUNTER — TELEPHONE (OUTPATIENT)
Dept: FAMILY MEDICINE CLINIC | Facility: CLINIC | Age: 57
End: 2022-04-20

## 2022-04-20 LAB — SARS-COV-2 AG UPPER RESP QL IA: ABNORMAL

## 2022-04-20 NOTE — TELEPHONE ENCOUNTER
He should make a virtual appointment to go over possible treatments and we will need a picture of the test in [facility]

## 2022-04-20 NOTE — TELEPHONE ENCOUNTER
Please advise   Spoke to patient and asked if he could send us a photo through my chart  He stated that he does not know how to navigate mychart  Patient is requesting a call back from you    Price Seats, MA

## 2022-04-20 NOTE — TELEPHONE ENCOUNTER
From: Kaylene Sicard  To: Lee Almanza MD  Sent: 4/20/2022 3:17 PM EDT  Subject: Covid test results    Here are my results  Just reaching out to see if you recommend therapeutic of any kind  I am 4 days into symptoms   And 3 day fever is gone

## 2022-04-20 NOTE — TELEPHONE ENCOUNTER
Patient did not want to schedule appt at this time  States he will call back if he feels he needs an appt

## 2022-04-20 NOTE — TELEPHONE ENCOUNTER
Pt took two home covid test and they are positive  He symptoms are cough, sore throat and a fever which has broken  He has questions about possible medication to help with symptoms and is requesting a call from Dr Tiana Santamaria

## 2022-05-11 DIAGNOSIS — G47.00 INSOMNIA, UNSPECIFIED TYPE: ICD-10-CM

## 2022-05-12 RX ORDER — ZOLPIDEM TARTRATE 10 MG/1
TABLET ORAL
Qty: 30 TABLET | Refills: 0 | Status: SHIPPED | OUTPATIENT
Start: 2022-05-12

## 2022-06-29 ENCOUNTER — TELEPHONE (OUTPATIENT)
Dept: PSYCHIATRY | Facility: CLINIC | Age: 57
End: 2022-06-29

## 2022-08-01 ENCOUNTER — OFFICE VISIT (OUTPATIENT)
Dept: FAMILY MEDICINE CLINIC | Facility: CLINIC | Age: 57
End: 2022-08-01
Payer: COMMERCIAL

## 2022-08-01 VITALS
WEIGHT: 246.8 LBS | HEART RATE: 86 BPM | BODY MASS INDEX: 30.69 KG/M2 | TEMPERATURE: 97.1 F | SYSTOLIC BLOOD PRESSURE: 134 MMHG | HEIGHT: 75 IN | RESPIRATION RATE: 18 BRPM | OXYGEN SATURATION: 99 % | DIASTOLIC BLOOD PRESSURE: 78 MMHG

## 2022-08-01 DIAGNOSIS — E11.65 TYPE 2 DIABETES MELLITUS WITH HYPERGLYCEMIA, WITHOUT LONG-TERM CURRENT USE OF INSULIN (HCC): Primary | ICD-10-CM

## 2022-08-01 DIAGNOSIS — E78.2 MIXED HYPERLIPIDEMIA: ICD-10-CM

## 2022-08-01 PROCEDURE — 99213 OFFICE O/P EST LOW 20 MIN: CPT | Performed by: INTERNAL MEDICINE

## 2022-08-01 NOTE — PROGRESS NOTES
Assessment/Plan:    1  Type 2 diabetes mellitus with hyperglycemia, without long-term current use of insulin (HCC)  -     Microalbumin / creatinine urine ratio          There are no Patient Instructions on file for this visit  Return in about 6 months (around 2/1/2023)  Subjective:      Patient ID: Kayla Ferguson is a 56 y o  male  Chief Complaint   Patient presents with    Follow-up     6 month f/u [facility]       Here for DM follow up  Is seeing an endocrinologist and is now off metformin and on ozempic  Brings in recent readings and A1C 5 6, cholesterol normal   Denies hypoglycemia  There is no chest pain or dyspnea  Works out daily  The following portions of the patient's history were reviewed and updated as appropriate: allergies, current medications, past family history, past medical history, past social history, past surgical history and problem list     Review of Systems   Constitutional: Negative  Respiratory: Negative  Cardiovascular: Negative  Endocrine: Negative  Psychiatric/Behavioral: Negative  Current Outpatient Medications   Medication Sig Dispense Refill    ALPRAZolam (XANAX) 0 25 mg tablet Take 1 tablet (0 25 mg total) by mouth daily at bedtime as needed for anxiety 30 tablet 0    anastrozole (ARIMIDEX) 1 mg tablet       atorvastatin (LIPITOR) 40 mg tablet Take 1 tablet (40 mg total) by mouth daily 90 tablet 3    esomeprazole (NexIUM) 40 MG capsule       niacin (NIASPAN) 500 mg CR tablet Take 500 mg by mouth daily      Omega-3 Fatty Acids (FISH OIL) 1,000 mg Take 1,000 mg by mouth daily      Ozempic, 1 MG/DOSE, 4 MG/3ML SOPN injection pen       testosterone cypionate (DEPO-TESTOSTERONE) 200 mg/mL SOLN Inject 5 mL into a muscle 2 (two) times a week      VASCEPA 1 g CAPS       zolpidem (AMBIEN) 10 mg tablet TAKE ONE TABLET BY MOUTH EVERY DAY AT BEDTIME AS NEEDED FOR SLEEP 30 tablet 0     No current facility-administered medications for this visit  Objective:    /78   Pulse 86   Temp (!) 97 1 °F (36 2 °C)   Resp 18   Ht 6' 3" (1 905 m)   Wt 112 kg (246 lb 12 8 oz)   SpO2 99%   BMI 30 85 kg/m²        Physical Exam  Constitutional:       Appearance: He is well-developed  Eyes:      Conjunctiva/sclera: Conjunctivae normal    Neck:      Thyroid: No thyromegaly  Vascular: No JVD  Cardiovascular:      Rate and Rhythm: Normal rate and regular rhythm  Heart sounds: Normal heart sounds  No murmur heard  No friction rub  No gallop  Pulmonary:      Effort: Pulmonary effort is normal       Breath sounds: Normal breath sounds  No wheezing or rales  Abdominal:      General: Bowel sounds are normal  There is no distension  Palpations: Abdomen is soft  Tenderness: There is no abdominal tenderness  Musculoskeletal:      Cervical back: Neck supple  Psychiatric:         Mood and Affect: Mood normal          Behavior: Behavior normal          Thought Content:  Thought content normal          Judgment: Judgment normal                 Soto Mosley MD

## 2022-08-02 LAB
ALBUMIN/CREAT UR: 6 MG/G CREAT (ref 0–29)
CREAT UR-MCNC: 213.8 MG/DL
MICROALBUMIN UR-MCNC: 12.4 UG/ML

## 2022-08-02 PROCEDURE — 3061F NEG MICROALBUMINURIA REV: CPT | Performed by: ORTHOPAEDIC SURGERY

## 2022-08-04 ENCOUNTER — APPOINTMENT (OUTPATIENT)
Dept: RADIOLOGY | Facility: CLINIC | Age: 57
End: 2022-08-04
Payer: COMMERCIAL

## 2022-08-04 ENCOUNTER — OFFICE VISIT (OUTPATIENT)
Dept: OBGYN CLINIC | Facility: CLINIC | Age: 57
End: 2022-08-04
Payer: COMMERCIAL

## 2022-08-04 VITALS
DIASTOLIC BLOOD PRESSURE: 80 MMHG | SYSTOLIC BLOOD PRESSURE: 150 MMHG | HEIGHT: 75 IN | BODY MASS INDEX: 31.08 KG/M2 | WEIGHT: 250 LBS | HEART RATE: 80 BPM

## 2022-08-04 DIAGNOSIS — G89.29 CHRONIC PAIN OF RIGHT KNEE: ICD-10-CM

## 2022-08-04 DIAGNOSIS — G89.29 CHRONIC RIGHT SHOULDER PAIN: ICD-10-CM

## 2022-08-04 DIAGNOSIS — M25.561 RIGHT KNEE PAIN, UNSPECIFIED CHRONICITY: ICD-10-CM

## 2022-08-04 DIAGNOSIS — M25.561 CHRONIC PAIN OF RIGHT KNEE: ICD-10-CM

## 2022-08-04 DIAGNOSIS — Z01.89 ENCOUNTER FOR OTHER SPECIFIED SPECIAL EXAMINATIONS: ICD-10-CM

## 2022-08-04 DIAGNOSIS — M17.11 PRIMARY OSTEOARTHRITIS OF RIGHT KNEE: Primary | ICD-10-CM

## 2022-08-04 DIAGNOSIS — M25.511 CHRONIC RIGHT SHOULDER PAIN: ICD-10-CM

## 2022-08-04 DIAGNOSIS — M54.2 NECK PAIN: ICD-10-CM

## 2022-08-04 PROCEDURE — 99214 OFFICE O/P EST MOD 30 MIN: CPT | Performed by: ORTHOPAEDIC SURGERY

## 2022-08-04 PROCEDURE — 73560 X-RAY EXAM OF KNEE 1 OR 2: CPT

## 2022-08-04 PROCEDURE — 20610 DRAIN/INJ JOINT/BURSA W/O US: CPT | Performed by: ORTHOPAEDIC SURGERY

## 2022-08-04 PROCEDURE — 73562 X-RAY EXAM OF KNEE 3: CPT

## 2022-08-04 RX ORDER — BUPIVACAINE HYDROCHLORIDE 5 MG/ML
6 INJECTION, SOLUTION EPIDURAL; INTRACAUDAL
Status: COMPLETED | OUTPATIENT
Start: 2022-08-04 | End: 2022-08-04

## 2022-08-04 RX ORDER — TRIAMCINOLONE ACETONIDE 40 MG/ML
80 INJECTION, SUSPENSION INTRA-ARTICULAR; INTRAMUSCULAR
Status: COMPLETED | OUTPATIENT
Start: 2022-08-04 | End: 2022-08-04

## 2022-08-04 RX ADMIN — TRIAMCINOLONE ACETONIDE 80 MG: 40 INJECTION, SUSPENSION INTRA-ARTICULAR; INTRAMUSCULAR at 14:22

## 2022-08-04 RX ADMIN — BUPIVACAINE HYDROCHLORIDE 6 ML: 5 INJECTION, SOLUTION EPIDURAL; INTRACAUDAL at 14:22

## 2022-08-04 NOTE — PROGRESS NOTES
Assessment/Plan:  1  Primary osteoarthritis of right knee  Large joint arthrocentesis: R knee   2  Chronic pain of right knee  XR knee 3 vw right non injury    Large joint arthrocentesis: R knee   3  Chronic right shoulder pain  Ambulatory Referral to Orthopedic Surgery   4  Neck pain  Ambulatory Referral to Orthopedic Surgery     Scribe Attestation    I,:  Brandi Gonzáles am acting as a scribe while in the presence of the attending physician :       I,:  Bran Small, DO personally performed the services described in this documentation    as scribed in my presence :         Vale Mcnamara is a pleasant 72-year-old male who reports to the office today for initial evaluation of his right knee  After reviewing his imaging and performing a thorough history and physical exam I explained that he is symptomatic of his severe underlying osteoarthritis  We discussed treatment options today including non operative management as well as total knee arthroplasty  We both agreed that it is in his best interest to maximize conservative treatment due to his relatively young age and high activity level  I offered to perform a corticosteroid injection for symptomatic relief today  He consented to and underwent the injection as documented below without issue or complication  This was well tolerated by the patient  Post-injection instructions were provided  He understands this can be repeated no sooner than 3 months  We did also discuss the indication for graduating to viscosupplementation injection therapy  We will plan to see him back in approximately 3 months to gauge the efficacy of today's injection  I did also provide him with a referral to my partner Bebeto Santamaria for evaluation of his right shoulder and neck complaints  All of his questions and concerns were addressed today  Large joint arthrocentesis: R knee  Universal Protocol:  Consent: Verbal consent obtained  Written consent not obtained    Risks and benefits: risks, benefits and alternatives were discussed  Consent given by: patient  Patient understanding: patient states understanding of the procedure being performed  Site marked: the operative site was marked  Patient identity confirmed: verbally with patient    Supporting Documentation  Indications: pain   Procedure Details  Location: knee - R knee  Preparation: Patient was prepped and draped in the usual sterile fashion  Needle size: 20 G  Ultrasound guidance: no  Approach: anterolateral  Medications administered: 6 mL bupivacaine (PF) 0 5 %; 80 mg triamcinolone acetonide 40 mg/mL    Patient tolerance: patient tolerated the procedure well with no immediate complications  Dressing:  Sterile dressing applied          Subjective:  Initial evaluation of right knee pain     Patient ID: Laxmi Heredia is a 56 y o  male reports the office today of initial evaluation of his right knee  At today's visit, he complains of aching pain about the medial aspect of his knee that is worse with activity and better at rest   This pain can be severe  She received treatment in the past through the [facility] with Dr Segun Storm  He perceived a cortisone injection on 04/26/2022  He also received a Gel One injection on 05/24/2022  This provided some benefit for the patient  He also uses a knee sleeve at times which he feels is beneficial for activity  His job duties are sedentary in nature but he does enjoy being active including participating in [facility] [employer]  He denies any recent injury or trauma  Denies any past surgery on the right knee  Review of Systems   Constitutional: Negative for chills, fever and unexpected weight change  HENT: Negative for hearing loss, nosebleeds and sore throat  Eyes: Negative for pain, redness and visual disturbance  Respiratory: Negative for cough, shortness of breath and wheezing  Cardiovascular: Negative for chest pain, palpitations and leg swelling     Gastrointestinal: Negative for abdominal pain, nausea and vomiting  Endocrine: Negative for polyphagia and polyuria  Genitourinary: Negative for dysuria and hematuria  Musculoskeletal: Positive for arthralgias and myalgias  Negative for joint swelling  See HPI   Skin: Negative for rash and wound  Neurological: Negative for dizziness, numbness and headaches  Psychiatric/Behavioral: Negative for decreased concentration and suicidal ideas  The patient is not nervous/anxious            Past Medical History:   Diagnosis Date    Anxiety     Colon polyp     Diabetes mellitus ([address] )     GERD (gastroesophageal reflux disease)     Hernia of abdominal wall     Hyperlipidemia        Past Surgical History:   Procedure Laterality Date    COLON SURGERY  2013    HEMICOLECTOMY      INCISIONAL HERNIA REPAIR N/A 12/27/2019    Procedure: REPAIR HERNIA INCISIONAL;  Surgeon: Caro Kline MD;  Location: [facility];  Service: General    AL KNEE SCOPE,MED/LAT MENISECTOMY Left 10/17/2019    Procedure: ARTHROSCOPY KNEE MEDIAL MENISCECTOMY;  Surgeon: Weston Tellez MD;  Location: [facility];  Service: Orthopedics    SKIN CANCER EXCISION      WISDOM TOOTH EXTRACTION         Family History   Problem Relation Age of Onset    Diabetes Mother     Lung cancer Mother     Hyperlipidemia Father     Hypertension Father     Hyperlipidemia Brother     Hypertension Brother        Social History     Occupational History    Not on file   Tobacco Use    Smoking status: Never Smoker    Smokeless tobacco: Never Used   Vaping Use    Vaping Use: Never used   Substance and Sexual Activity    Alcohol use: Never    Drug use: Never    Sexual activity: Not on file         Current Outpatient Medications:     ALPRAZolam (XANAX) 0 25 mg tablet, Take 1 tablet (0 25 mg total) by mouth daily at bedtime as needed for anxiety, Disp: 30 tablet, Rfl: 0    anastrozole (ARIMIDEX) 1 mg tablet, , Disp: , Rfl:     atorvastatin (LIPITOR) 40 mg tablet, Take 1 tablet (40 mg total) by mouth daily, Disp: 90 tablet, Rfl: 3    esomeprazole (NexIUM) 40 MG capsule, , Disp: , Rfl:     niacin (NIASPAN) 500 mg CR tablet, Take 500 mg by mouth daily, Disp: , Rfl:     Omega-3 Fatty Acids (FISH OIL) 1,000 mg, Take 1,000 mg by mouth daily, Disp: , Rfl:     Ozempic, 1 MG/DOSE, 4 MG/3ML SOPN injection pen, , Disp: , Rfl:     testosterone cypionate (DEPO-TESTOSTERONE) 200 mg/mL SOLN, Inject 5 mL into a muscle 2 (two) times a week, Disp: , Rfl:     VASCEPA 1 g CAPS, , Disp: , Rfl:     zolpidem (AMBIEN) 10 mg tablet, TAKE ONE TABLET BY MOUTH EVERY DAY AT BEDTIME AS NEEDED FOR SLEEP, Disp: 30 tablet, Rfl: 0    No Known Allergies    Objective:  Vitals:    08/04/22 1307   BP: 150/80   Pulse: 80       Body mass index is 31 25 kg/m²  Right Knee Exam     Muscle Strength   The patient has normal right knee strength  Tenderness   The patient is experiencing tenderness in the medial joint line  Range of Motion   Extension: 0   Flexion: 130     Tests   Varus: negative Valgus: negative  Drawer:  Anterior - negative    Posterior - negative    Other   Erythema: absent  Scars: absent  Sensation: normal  Pulse: present  Swelling: none  Effusion: no effusion present    Comments:  Stable at 0, 30, 90°  Neurovascularly intact distally  No parapatellar crepitus  Patellofemoral grind: Positive          Observations     Right Knee   Negative for effusion  Physical Exam  Vitals and nursing note reviewed  Constitutional:       Appearance: Normal appearance  HENT:      Head: Normocephalic and atraumatic  Eyes:      General: No scleral icterus  Extraocular Movements: Extraocular movements intact  Conjunctiva/sclera: Conjunctivae normal    Cardiovascular:      Rate and Rhythm: Normal rate and regular rhythm  Pulmonary:      Effort: Pulmonary effort is normal  No respiratory distress  Abdominal:      Palpations: Abdomen is soft  Tenderness: There is no abdominal tenderness  Musculoskeletal:      Cervical back: Neck supple  Right knee: No effusion  Skin:     General: Skin is warm and dry  Neurological:      Mental Status: He is alert  I have personally reviewed pertinent films in PACS  X-rays of the right knee taken on 08/04/2022 demonstrating severe osteoarthritis of the right knee medial narrowing, sclerosis, and osteophytosis  No acute fracture dislocation  No lytic or blastic lesion

## 2022-08-12 ENCOUNTER — TELEPHONE (OUTPATIENT)
Dept: PSYCHIATRY | Facility: CLINIC | Age: 57
End: 2022-08-12

## 2022-08-12 NOTE — TELEPHONE ENCOUNTER
Behavorial Health Outpatient Intake Questions    Referred by: Self    Please advised interviewee that they need to answer all questions truthfully to allow for best care and any misrepresentations of information may affect their ability to be seen at this clinic   => Was this discussed? Yes     [facility]     Presenting Problem (in patient's words): Anxiety   Are there any developmental disabilities? ? If yes, can they speak to you on the phone? If they are too limited to speak to you on phone, refer out Yes, ADD    Are you taking any psychiatric medications? Yes    => If yes, who prescribes? If yes, are they injectable medications? Xanax,   Does the patient have a language barrier or hearing impairment? No    Have you been treated at [facility] by a therapist or a doctor in the past? If yes, who? No    Has the patient been hospitalized for mental health? No   If yes, how long ago was last hospitalization and where was it? Do you actively use alcohol or marijuana or illegal substances? If yes, what and how much - refer out to Drug and alcohol treatment if use is excessive or daily use of illegal substances No concerns of substance abuse are reported  Do you have a community treatment team or ? No    Legal History-     Does the patient have any history of arrests, correction/retirement time, or DUIs? No  If Yes-  1) What types of charges? 2) When were they last incarcerated? 3) Are they currently on parole or probation? Minor Child-    Who has custody of the child? Is there a custody agreement? If there is a custody agreement remind parent that they must bring a copy to the first appt or they will not be seen       Intake Team, please check with provider before scheduling if flags come up such as:  - complex case  - legal history (other than DUI)  - communication barrier concerns are present  - if, in your judgment, this needs further review    ACCEPTED as a patient Yes  => Appointment Date: August 17, 2022 at 12:00 pm with Dr Bela Galvin? No    Name of Insurance Co: [employer] ID# [id number]  [id number] Phone #  If ins is primary or secondary  If patient is a minor, parents information such as Name, D  O B of guarantor

## 2022-08-15 ENCOUNTER — TELEPHONE (OUTPATIENT)
Dept: OBGYN CLINIC | Facility: HOSPITAL | Age: 57
End: 2022-08-15

## 2022-08-15 NOTE — TELEPHONE ENCOUNTER
Pt sees Dr Jorge Nava    Pt is calling asking if the Dr can look and his imaging and see if a clean out or something else can be done to his rt knee to put off getting a knee replacement  Pt states that the cortisone shot was not very effective      # [phone]

## 2022-08-16 NOTE — TELEPHONE ENCOUNTER
No, arthroscopic "clean out" procedures are no longer performed because they were found to not be effective and can actually hasten the progression of arthritis and need for a knee replacement  If the cortisone injection provided less than 6 weeks worth of relief, we can submit for authorization of viscosupplementation    Will leave this to his discretion

## 2022-08-16 NOTE — TELEPHONE ENCOUNTER
Advised patient, he had visco 2-3 months ago at [facility] so this is not an option at this time for him

## 2022-08-23 ENCOUNTER — OFFICE VISIT (OUTPATIENT)
Dept: PSYCHIATRY | Facility: CLINIC | Age: 57
End: 2022-08-23
Payer: COMMERCIAL

## 2022-08-23 DIAGNOSIS — F32.89 OTHER DEPRESSION: ICD-10-CM

## 2022-08-23 DIAGNOSIS — G47.00 INSOMNIA, UNSPECIFIED TYPE: Primary | ICD-10-CM

## 2022-08-23 PROBLEM — F42.2 MIXED OBSESSIONAL THOUGHTS AND ACTS: Status: ACTIVE | Noted: 2022-08-23

## 2022-08-23 PROCEDURE — 90792 PSYCH DIAG EVAL W/MED SRVCS: CPT | Performed by: NURSE PRACTITIONER

## 2022-08-23 RX ORDER — HYDROXYZINE PAMOATE 25 MG/1
25 CAPSULE ORAL 2 TIMES DAILY PRN
Qty: 60 CAPSULE | Refills: 0 | Status: SHIPPED | OUTPATIENT
Start: 2022-08-23 | End: 2022-09-20

## 2022-08-23 NOTE — PSYCH
Reason for visit:   Chief Complaint   Patient presents with    Follow-up    Medication Management   This note was not shared with the patient due to reasonable likelihood of causing patient harm    HPI     Bernadette Hernandez is a 56 y o  male with a history of Depression who presents for psychiatric evaluation due to his desire to ask questions about his medications being prescribed by his "botox doctor "  Primary complaints include: depression worse and poor concentration  Onset of symptoms was gradual starting several years ago with rapidly worsening course since that time  Psychosocial Stressors: marital     Bernadette Hernandez was seen today for his initial assessment, presenting with a history of ADD some anxiety and obsessive ruminations  However he was more interested in finding out what with the mechanisms of actions for the Xanax that another professional has prescribed for him, and when that was given to him, it brought tears to my eyes " He stated that he and his wife are going through a separation and divorce at her request, and he tends to obsessively ruminates about the issues that happen in the marriage or hears music constantly    He was interested in asking about another medication, similar to Xanax, without the addictive potential and I recommended a trial of hydroxyzine 25 mg b i d  p r n  to which he agreed  He left the session rather shortly because he had gotten what he came here looking for    On his way out he failed to make a follow-up appointment on the grounds that he would tend to forget it and he would cut call back into the office when he needs 1  I strongly recommended counselor to help him deal with the separation and divorce but he was not very receptive         Review Of Systems:      Mood Anxiety   Behavior Normal    Thought Content Normal   General Relationship Problems   Personality Normal   Other Psych Symptoms Normal   Constitutional As Noted in HPI   ENT As Noted in HPI   Cardiovascular As Noted in HPI   Respiratory As Noted in HPI   Gastrointestinal As Noted in HPI   Genitourinary As Noted in HPI   Musculoskeletal As Noted in HPI   Integumentary As Noted in HPI   Neurological As Noted in HPI   Endocrine Normal    Other Symptoms Normal        Past Psychiatric History:      Past Inpatient Psychiatric Treatment:   None   Past Outpatient Psychiatric Treatment:    none  Past Suicide Attempts:    no  Past Violent Behavior:    no  Past Psychiatric Medication Trials:    Xanax and Ambien    Family Psychiatric History:   Family History   Problem Relation Age of Onset    Diabetes Mother     Lung cancer Mother     Hyperlipidemia Father     Hypertension Father     Hyperlipidemia Brother     Hypertension Brother        Social History:    Education: high school diploma/GED  Learning Disabilities: n/a   Marital history:   Living arrangement, social support: The patient lives in home with extended family  Occupational History: self-employed  Functioning Relationships: strained with spouse or significant others    Other Pertinent History: None     Social History     Substance and Sexual Activity   Drug Use Never       Traumatic History:       Abuse: n/a   Other Traumatic Events: n/a    The following portions of the patient's history were reviewed and updated as appropriate: allergies, current medications, past family history, past medical history, past social history, past surgical history and problem list      Social History     Socioeconomic History    Marital status: /Civil Union     Spouse name: Not on file    Number of children: Not on file    Years of education: Not on file    Highest education level: Not on file   Occupational History    Not on file   Tobacco Use    Smoking status: Never Smoker    Smokeless tobacco: Never Used   Vaping Use    Vaping Use: Never used   Substance and Sexual Activity    Alcohol use: Never    Drug use: Never    Sexual activity: Not on file   Other Topics Concern    Not on file   Social History Narrative    Not on file     Social Determinants of Health     Financial Resource Strain: Not on file   Food Insecurity: Not on file   Transportation Needs: Not on file   Physical Activity: Not on file   Stress: Not on file   Social Connections: Not on file   Intimate Partner Violence: Not on file   Housing Stability: Not on file     Social History     Social History Narrative    Not on file       Mental status:  Appearance calm and cooperative , adequate hygiene and grooming and good eye contact    Mood dysphoric   Affect affect appropriate    Speech overproductive   Thought Processes normal thought processes   Hallucinations no hallucinations present    Thought Content no delusions   Abnormal Thoughts no suicidal thoughts  and no homicidal thoughts    Orientation  oriented to person and place and time   Remote Memory short term memory intact and long term memory intact   Attention Span concentration intact   Intellect Appears to be of Average Intelligence   Insight Insight intact   Judgement judgment was intact   Muscle Strength Normal gait    Language intact   Fund of Knowledge displays adequate knowledge of current events and adequate fund of knowledge regarding past history   Pain moderate to severe   Pain Scale 5         Laboratory Results: No results found for this or any previous visit  Assessment/Plan:      Diagnoses and all orders for this visit:    Other depression    Insomnia, unspecified type  -     Ambulatory Referral to Psychiatry          Treatment Recommendations- Risks Benefits         Immediate Medical/Psychiatric/Psychotherapy Treatments and Any Precautions: d/c Xanax   Start hydroxyzine 25 mg BID, PRN  Risks, Benefits And Possible Side Effects Of Medications:  Risks, benefits, and possible side effects of medications explained to patient and patient verbalizes understanding    Controlled Medication Discussion: Discussed with patient the risks of sedation, respiratory depression, impairment of ability to drive and potential for abuse and addiction related to treatment with benzodiazepine medications  The patient understands risk of treatment with benzodiazepine medications, agrees to not drive if feels impaired and agrees to take medications as prescribed

## 2022-08-23 NOTE — BH TREATMENT PLAN
TREATMENT PLAN (Medication Management Only)        [facility]    Name and Date of Birth:  Sherren Pax 56 y o  1965  Date of Treatment Plan: August 23, 2022  Diagnosis/Diagnoses:    1  Insomnia, unspecified type    2  Other depression      Strengths/Personal Resources for Self-Care: supportive friends, ability to communicate well, ability to listen, general fund of knowledge, motivation for treatment  Area/Areas of need (in own words): depression, ADHD symptoms  1  Long Term Goal: improve control of acceptable anxiety level  Target Date:1 month - 9/23/2022  Person/Persons responsible for completion of goal: Kilo Cochran  2  Short Term Objective (s) - How will we reach this goal?:   A  Provider new recommended medication/dosage changes and/or continue medication(s): continue current medications as prescribed  B  N/A   C  N/A  Target Date:1 month - 9/23/2022  Person/Persons Responsible for Completion of Goal: Kilo Cochran  Progress Towards Goals: initiating treatment  Treatment Modality: medication management every 1 months  Review due 180 days from date of this plan: 1 month - 9/23/2022  Expected length of service: ongoing treatment  My Physician/PA/NP and I have developed this plan together and I agree to work on the goals and objectives  I understand the treatment goals that were developed for my treatment

## 2022-09-14 DIAGNOSIS — E11.65 TYPE 2 DIABETES MELLITUS WITH HYPERGLYCEMIA, WITHOUT LONG-TERM CURRENT USE OF INSULIN (HCC): Primary | ICD-10-CM

## 2022-09-20 ENCOUNTER — TELEMEDICINE (OUTPATIENT)
Dept: PSYCHIATRY | Facility: CLINIC | Age: 57
End: 2022-09-20
Payer: COMMERCIAL

## 2022-09-20 DIAGNOSIS — F51.01 PRIMARY INSOMNIA: ICD-10-CM

## 2022-09-20 DIAGNOSIS — F32.89 OTHER DEPRESSION: ICD-10-CM

## 2022-09-20 DIAGNOSIS — F41.9 ANXIETY: Primary | ICD-10-CM

## 2022-09-20 PROCEDURE — 99214 OFFICE O/P EST MOD 30 MIN: CPT | Performed by: NURSE PRACTITIONER

## 2022-09-20 PROCEDURE — 90833 PSYTX W PT W E/M 30 MIN: CPT | Performed by: NURSE PRACTITIONER

## 2022-09-20 RX ORDER — TRAZODONE HYDROCHLORIDE 100 MG/1
100 TABLET ORAL
Qty: 30 TABLET | Refills: 5 | Status: SHIPPED | OUTPATIENT
Start: 2022-09-20

## 2022-09-20 RX ORDER — HYDROXYZINE PAMOATE 50 MG/1
50 CAPSULE ORAL 3 TIMES DAILY PRN
Qty: 90 CAPSULE | Refills: 0 | Status: SHIPPED | OUTPATIENT
Start: 2022-09-20

## 2022-09-20 NOTE — BH TREATMENT PLAN
TREATMENT PLAN (Medication Management Only)        [facility] [employer]    Name and Date of Birth:  Radha Ndiaye 56 y o  1965  Date of Treatment Plan: September 20, 2022  Diagnosis/Diagnoses:    1  Anxiety    2  Primary insomnia    3  Other depression      Strengths/Personal Resources for Self-Care: supportive family, taking medications as prescribed, ability to communicate well, ability to reason, average or above intelligence, motivation for treatment  Area/Areas of need (in own words): "insomnia", anxiety symptoms, depressive symptoms  1  Long Term Goal: continue improvement in anxiety  Target Date:3 months - 12/20/2022  Person/Persons responsible for completion of goal: Mandeep Christensen  2  Short Term Objective (s) - How will we reach this goal?:   A  Provider new recommended medication/dosage changes and/or continue medication(s): continue current medications as prescribed  B  N/A   C  N/A  Target Date:3 months - 12/20/2022  Person/Persons Responsible for Completion of Goal: Mandeep Christensen  Progress Towards Goals: continuing treatment  Treatment Modality: medication management every 3 months  Review due 180 days from date of this plan: 3 months - 12/20/2022  Expected length of service: ongoing treatment  My Physician/PA/NP and I have developed this plan together and I agree to work on the goals and objectives  I understand the treatment goals that were developed for my treatment

## 2022-09-20 NOTE — PSYCH
This note was not shared with the patient due to reasonable likelihood of causing patient harm    PROGRESS NOTE        Terence Storey      Name and Date of Birth:  Fiorella Tran 56 y o  1965    Date of Visit: 09/20/22       After connecting through [employer], the patient was identified by name and date of birth  Fiorella Tran was informed that this is a telemedicine visit and that the visit is being conducted through [address] [place] [address] [facility] [employer] and patient was informed that this is a secure, HIPAA-compliant platform  He agrees to proceed  which may not be secure and therefore, might not be HIPAA-compliant  My office door was closed  No one else was in the room  He acknowledged consent and understanding of privacy and security of the video platform  The patient has agreed to participate and understands they can discontinue the visit at any time  SUBJECTIVE:    Jemima Schmidt was seen today for follow up and medication management  He was casually dressed, AO3X and maintained good eye contact  His speech was clear and of normal r/r/v  Thought processes were clear and goal directed  Today he reported some improvement with hydroxyzine 25 mg bid but still has residual sxs of anxiety  No depression was reported  He did share concerns about his Ambien hs as he is not getting as strong an effect   As in the past and asked for alternative treatment  I recommended trazodone 100 mg hs to be titrated as needed or tolerated  Patient agreed to thhis change  His appetite is good and has been working agt [employer]  No other new sxs or side effects were reported  He denies suicidal ideation, intent or plan at present, has no suicidal ideation, intent or plan at present  He denies any auditory hallucinations and visual hallucinations, denies any other delusional thinking, denies any delusional thinking  He denies any side effects from medications      HPI ROS Appetite Changes and Sleep: normal appetite, normal sleep    Review Of Systems:      Constitutional As noted in HPI   ENT As noted in HPI   Cardiovascular As noted in HPI   Respiratory As noted in HPI   Gastrointestinal As noted in HPI   Genitourinary As noted in HPI   Musculoskeletal As noted in HPI   Integumentary As noted in HPI   Neurological As noted in HPI   Endocrine As noted in HPI   Other Symptoms Negative and None       Laboratory Results: No results found for this or any previous visit      Substance Abuse History:    Social History     Substance and Sexual Activity   Drug Use Never       Family Psychiatric History:     Family History   Problem Relation Age of Onset    Diabetes Mother     Lung cancer Mother     Hyperlipidemia Father     Hypertension Father     Hyperlipidemia Brother     Hypertension Brother        The following portions of the patient's history were reviewed and updated as appropriate: past family history, past medical history, past social history, past surgical history and problem list     Social History     Socioeconomic History    Marital status: /Civil Union     Spouse name: Not on file    Number of children: Not on file    Years of education: Not on file    Highest education level: Not on file   Occupational History    Not on file   Tobacco Use    Smoking status: Never Smoker    Smokeless tobacco: Never Used   Vaping Use    Vaping Use: Never used   Substance and Sexual Activity    Alcohol use: Never    Drug use: Never    Sexual activity: Not on file   Other Topics Concern    Not on file   Social History Narrative    Not on file     Social Determinants of Health     Financial Resource Strain: Not on file   Food Insecurity: Not on file   Transportation Needs: Not on file   Physical Activity: Not on file   Stress: Not on file   Social Connections: Not on file   Intimate Partner Violence: Not on file   Housing Stability: Not on file     Social History     Social History Narrative    Not on file Social History     Tobacco History     Smoking Status  Never Smoker    Smokeless Tobacco Use  Never Used          Alcohol History     Alcohol Use Status  Never          Drug Use     Drug Use Status  Never          Sexual Activity     Sexually Active  Not Asked          Activities of Daily Living    Not Asked                     OBJECTIVE:     Mental Status Evaluation:    Appearance age appropriate, casually dressed   Behavior pleasant, cooperative   Speech normal volume, normal pitch   Mood Appropriate   Affect F&A   Thought Processes logical   Associations intact associations   Thought Content normal   Perceptual Disturbances: none   Abnormal Thoughts  Risk Potential Suicidal ideation - None  Homicidal ideation - None  Potential for aggression - No   Orientation oriented to person, place, time/date and situation   Memory recent and remote memory grossly intact   Cosciousness alert and awake   Attention Span attention span and concentration are age appropriate   Intellect Appears to be of Average Intelligence   Insight age appropriate    Judgement good    Muscle Strength and  Gait muscle strength and tone were normal   Language Intact   Fund of Knowledge Intact   Pain none   Pain Scale 0       Assessment/Plan:       Diagnoses and all orders for this visit:    Anxiety  -     hydrOXYzine pamoate (VISTARIL) 50 mg capsule; Take 1 capsule (50 mg total) by mouth 3 (three) times a day as needed for itching    Primary insomnia  -     traZODone (DESYREL) 100 mg tablet; Take 1 tablet (100 mg total) by mouth daily at bedtime    Other depression          Treatment Recommendations/Precautions:    Continue current medications:    Risks/Benefits      Risks, Benefits And Possible Side Effects Of Medications:    Risks, benefits, and possible side effects of medications explained to patient and patient verbalizes understanding and agreement for treatment      Controlled Medication Discussion:   Discussed with patient the risks of sedation, respiratory depression, impairment of ability to drive and potential for abuse and addiction related to treatment with benzodiazepine medications  The patient understands risk of treatment with benzodiazepine medications, agrees to not drive if feels impaired and agrees to take medications as prescribed  Psychotherapy Provided:     Individual psychotherapy provided:  Today I spent 20 min counseling with Joseph Ramírez

## 2022-09-21 ENCOUNTER — OFFICE VISIT (OUTPATIENT)
Dept: OBGYN CLINIC | Facility: CLINIC | Age: 57
End: 2022-09-21
Payer: COMMERCIAL

## 2022-09-21 ENCOUNTER — APPOINTMENT (OUTPATIENT)
Dept: RADIOLOGY | Facility: CLINIC | Age: 57
End: 2022-09-21
Payer: COMMERCIAL

## 2022-09-21 VITALS
HEIGHT: 75 IN | HEART RATE: 98 BPM | WEIGHT: 249 LBS | DIASTOLIC BLOOD PRESSURE: 70 MMHG | BODY MASS INDEX: 30.96 KG/M2 | SYSTOLIC BLOOD PRESSURE: 112 MMHG

## 2022-09-21 DIAGNOSIS — G89.29 CHRONIC RIGHT SHOULDER PAIN: ICD-10-CM

## 2022-09-21 DIAGNOSIS — M19.011 PRIMARY OSTEOARTHRITIS OF RIGHT SHOULDER: Primary | ICD-10-CM

## 2022-09-21 DIAGNOSIS — M54.2 NECK PAIN: ICD-10-CM

## 2022-09-21 DIAGNOSIS — M25.511 CHRONIC RIGHT SHOULDER PAIN: ICD-10-CM

## 2022-09-21 PROCEDURE — 99214 OFFICE O/P EST MOD 30 MIN: CPT | Performed by: ORTHOPAEDIC SURGERY

## 2022-09-21 PROCEDURE — 72040 X-RAY EXAM NECK SPINE 2-3 VW: CPT

## 2022-09-21 PROCEDURE — 73030 X-RAY EXAM OF SHOULDER: CPT

## 2022-09-21 NOTE — PROGRESS NOTES
Assessment/Plan:  1  Primary osteoarthritis of right shoulder     2  Chronic right shoulder pain  Ambulatory Referral to Orthopedic Surgery    XR shoulder 2+ vw right   3  Neck pain  Ambulatory Referral to Orthopedic Surgery    XR spine cervical 2 or 3 vw injury       Scribe Attestation    I,:  Antoinette Dowling am acting as a scribe while in the presence of the attending physician :       I,:  Neto Jones MD personally performed the services described in this documentation    as scribed in my presence :             Jennifer Jacobson and I reviewed his x-rays together today  He does have moderate glenohumeral osteoarthritis with the osteophyte arising off the inferior humeral head and greater tuberosity  There is also a small piece of calcific tendinitis  I do feel the arthritic change is the driving factor in his shoulder pain and stiffness  I encouraged him to remain active  We discussed treatment options to help manage his pain in the form of steroid injection or over-the-counter medications such as Tylenol  We discussed proper dosage in  Also explained to the patient that a steroid injection would not change the mechanical contributors to his stiffness  For now Jennifer Kavon would like to continue working out and will manage his soreness with Tylenol  I recommended he avoid heavy overhead exercises and pressing as this can accelerate the degenerative changes in the shoulder  He had no further questions  He can follow-up with me as needed  Subjective: Sadie Moreno is a 56 y o  male who presents to the office today for evaluation of his right shoulder  If he states he has been experiencing mild right shoulder pain and stiffness for quite some time now  Jennifer Kavon has a goal of performing a pull-up  He states he is unable to do so and feels his shoulder stiffness is contributing to this  His pain is exacerbated with overhead type activities such as overhead press or simply reaching overhead    The pain is described as a soreness located at the lateral aspect of the right shoulder and will radiate towards the neck at times  He has no pain with rest and denies distal paresthesias  Review of Systems   Constitutional: Negative for chills, fever and unexpected weight change  HENT: Negative for hearing loss, nosebleeds and sore throat  Eyes: Negative for pain, redness and visual disturbance  Respiratory: Negative for cough, shortness of breath and wheezing  Cardiovascular: Negative for chest pain, palpitations and leg swelling  Gastrointestinal: Negative for abdominal pain, nausea and vomiting  Endocrine: Negative for polyphagia and polyuria  Genitourinary: Negative for dysuria and hematuria  Musculoskeletal:        See HPI   Skin: Negative for rash and wound  Neurological: Negative for dizziness, numbness and headaches  Psychiatric/Behavioral: Negative for decreased concentration and suicidal ideas  The patient is not nervous/anxious            Past Medical History:   Diagnosis Date    Anxiety     Colon polyp     Diabetes mellitus (HCC)     GERD (gastroesophageal reflux disease)     Hernia of abdominal wall     Hyperlipidemia        Past Surgical History:   Procedure Laterality Date    COLON SURGERY  2013    HEMICOLECTOMY      INCISIONAL HERNIA REPAIR N/A 12/27/2019    Procedure: REPAIR HERNIA INCISIONAL;  Surgeon: Michelle Shaikh MD;  Location: [facility];  Service: General    MN KNEE SCOPE,MED/LAT MENISECTOMY Left 10/17/2019    Procedure: ARTHROSCOPY KNEE MEDIAL MENISCECTOMY;  Surgeon: Ngoc Nolasco MD;  Location: [facility];  Service: Orthopedics    SKIN CANCER EXCISION      WISDOM TOOTH EXTRACTION         Family History   Problem Relation Age of Onset    Diabetes Mother     Lung cancer Mother     Hyperlipidemia Father     Hypertension Father     Hyperlipidemia Brother     Hypertension Brother        Social History     Occupational History    Not on file   Tobacco Use  Smoking status: Never Smoker    Smokeless tobacco: Never Used   Vaping Use    Vaping Use: Never used   Substance and Sexual Activity    Alcohol use: Never    Drug use: Never    Sexual activity: Not on file         Current Outpatient Medications:     anastrozole (ARIMIDEX) 1 mg tablet, , Disp: , Rfl:     atorvastatin (LIPITOR) 40 mg tablet, Take 1 tablet (40 mg total) by mouth daily, Disp: 90 tablet, Rfl: 3    esomeprazole (NexIUM) 40 MG capsule, , Disp: , Rfl:     hydrOXYzine pamoate (VISTARIL) 50 mg capsule, Take 1 capsule (50 mg total) by mouth 3 (three) times a day as needed for itching, Disp: 90 capsule, Rfl: 0    metFORMIN (GLUCOPHAGE) 500 mg tablet, TAKE TWO TABLETS BY MOUTH EVERY DAY WITH BREAKFAST, Disp: 90 tablet, Rfl: 1    niacin (NIASPAN) 500 mg CR tablet, Take 500 mg by mouth daily, Disp: , Rfl:     Omega-3 Fatty Acids (FISH OIL) 1,000 mg, Take 1,000 mg by mouth daily, Disp: , Rfl:     Ozempic, 1 MG/DOSE, 4 MG/3ML SOPN injection pen, , Disp: , Rfl:     testosterone cypionate (DEPO-TESTOSTERONE) 200 mg/mL SOLN, Inject 5 mL into a muscle 2 (two) times a week, Disp: , Rfl:     traZODone (DESYREL) 100 mg tablet, Take 1 tablet (100 mg total) by mouth daily at bedtime, Disp: 30 tablet, Rfl: 5    VASCEPA 1 g CAPS, , Disp: , Rfl:     No Known Allergies    Objective:  Vitals:    09/21/22 0903   BP: 112/70   Pulse: 98       Right Shoulder Exam     Tenderness   The patient is experiencing no tenderness  Range of Motion   Active abduction: 130   External rotation: 70   Forward flexion: 140   Internal rotation 0 degrees: L3     Muscle Strength   Abduction: 5/5   Internal rotation: 5/5   External rotation: 5/5   Supraspinatus: 5/5   Subscapularis: 5/5   Biceps: 5/5     Tests   Impingement: positive    Other   Sensation: normal  Pulse: present            Physical Exam  Vitals reviewed  Constitutional:       Appearance: He is well-developed  HENT:      Head: Normocephalic and atraumatic  Eyes:      General:         Right eye: No discharge  Left eye: No discharge  Conjunctiva/sclera: Conjunctivae normal    Cardiovascular:      Rate and Rhythm: Regular rhythm  Pulmonary:      Effort: Pulmonary effort is normal  No respiratory distress  Musculoskeletal:      Cervical back: Normal range of motion and neck supple  Skin:     General: Skin is warm and dry  Neurological:      Mental Status: He is alert and oriented to person, place, and time  Psychiatric:         Behavior: Behavior normal          I have personally reviewed pertinent films in PACS and my interpretation is as follows:   Xrays of the right shoulder demonstrate moderate glenohumeral osteoarthritis  There is evidence of decreased joint space, an osteophyte arising off of the inferior humeral head and greater tuberosity  There is evidence of calcific tendonitis  Xrays of the cervical spine taken today demonstrate degenerative changes at the level of C5-C6 with an osteophyte arising and progressing inferiorly off of the anterior aspect of C5

## 2022-12-12 DIAGNOSIS — E11.65 TYPE 2 DIABETES MELLITUS WITH HYPERGLYCEMIA, WITHOUT LONG-TERM CURRENT USE OF INSULIN (HCC): ICD-10-CM

## 2023-02-08 DIAGNOSIS — E78.2 MIXED HYPERLIPIDEMIA: ICD-10-CM

## 2023-02-08 RX ORDER — ATORVASTATIN CALCIUM 40 MG/1
TABLET, FILM COATED ORAL
Qty: 90 TABLET | Refills: 3 | Status: SHIPPED | OUTPATIENT
Start: 2023-02-08

## 2023-04-03 ENCOUNTER — TELEPHONE (OUTPATIENT)
Dept: OBGYN CLINIC | Facility: HOSPITAL | Age: 58
End: 2023-04-03

## 2023-04-03 NOTE — TELEPHONE ENCOUNTER
Caller: patient    Doctor:  Danii    Reason for call: pt will be calling to schedule an appt when he has his work schedule with him    Call back#:

## 2023-08-28 NOTE — PROGRESS NOTES
Daily Note     Today's date: 2019  Patient name: Valerio Andres  : 1965  MRN: [id number]  Referring provider: Renetta Massey DO  Dx:   Encounter Diagnosis   Name Primary?  Complex tear of medial meniscus of left knee as current injury, subsequent encounter Yes                  Subjective: Pt reports 0/10 pain in the knee  He reports he walked a lot while away in [place] and had minimal discomfort  "I had to consciously remind myself to walk heel toe   The only time I experienced pain was when I quickly performed a twisting motion, but then the pain disappeared in a few minutes "      Objective: See treatment diary below      Precautions left WBAT    Specialty Daily Treatment Diary     Manual  11/07 11/18  10/31 11/04/19   PROM Left knee flex and ext Left knee flex and ext  Left knee flex and ext Left knee flex and ext   STM    Left quad and hamstrings Left quad and hamstrings   PFJ mobs All directions gr II-III All directions gr II-III  All directions gr II-III All directions gr II-III   Flexibility Left hamstring Left hamstring  Left hamstring Left hamstring   TFJ mobs For extension gr III For extension gr III   For extension gr III           Exercise Diary         Rec bike 6 min lv 1 6 min lv 1  6 min lv 1 6 min lv 1   prostretch Hold 10, 1x10 Hold 10, 1x10  Hold 1, 1x10 Hold 10, 1x10   SL abd        Step ups 8 in, 2x10 8 in, 2x10  6 in 2x10 8 in, 2x10   Hamstring stretch Hold 30, 1x3 Hold 30, 1x3  Hold 30, 1x3  Hold 30, 1x3   TKE Wilson 3 5, Hold 5, 2x10 Dara 5 0, Hold 5, 2x10  Dara 3 5, Hold 5, 2x10 Dara 3 5, Hold 5, 2x10   Hip 3-way Dara 1 5, Hold 5, 1x15 Wilson 2 0 Hold 5, 1x15      SLR Hold 5, 3 lb, 2x10 Hold 5, 3 lb, 2x10  Hold 5, 2x10 Hold 5, 2x10   Knee flexion Standing, 3 lb, 2x10 Standing, 3 lb, 2x10      Heel slides HEP   Hold 5, 2x10 Hold 5, 2x10   bridges Red band, Hold 5, 2x10 Red band, Hold 5, 2x10  Hold 5, 1x15 Hold 5, 1x15   Quad sets HEP   Hold 5, 2x10 Hold 5, 2x10   Ankle pumps HEP       SAQ    Hold 5, 1x15 Hold 5, 2x10   squats 2x10 at bar 2x10 at bar, Hold 5  2x10 at bar 2x10 at bar   Prone hangs 3#, 3 mins 3#, 3 mins   3 mins   Clamshells         Lateral step down 8in, 1x15 ea 8 in, 1x15 ea   8 in, 1x15 ea    Side stepping  Yellow, 20 ft 2x2      Modalities        CP x10 min left knee x10 min left   x10 min left knee X 10 min left knee                 Assessment: Progressed patient's treatment diary and was able to tolerate session with no complaints of pain  Fatigue with side stepping and lateral step down  Able to self correct after cueing for proper technique during hip exercises to prevent compensation from the back  Fatigue at the end of the session  Plan: Plan for re-evaluation next visit  Patient was co-treated by ALYSSA López under my direct supervision  Rhofade Counseling: Rhofade is a topical medication which can decrease superficial blood flow where applied. Side effects are uncommon and include stinging, redness and allergic reactions.

## 2024-09-12 NOTE — TELEPHONE ENCOUNTER
Upon review of the In Basket request and the patient's chart, initial outreach has been made via fax, please see Contacts section for details  A second outreach attempt will be made within 3 business days      Thank you  Clark Lopez MA .

## (undated) DEVICE — GLOVE SRG BIOGEL 7.5

## (undated) DEVICE — DRESSING MEPILEX AG BORDER 4 X 8 IN

## (undated) DEVICE — PLUMEPEN PRO 10FT

## (undated) DEVICE — ASTOUND STANDARD SURGICAL GOWN, XL: Brand: CONVERTORS

## (undated) DEVICE — GLOVE INDICATOR PI UNDERGLOVE SZ 7.5 BLUE

## (undated) DEVICE — TIBURON EXTREMITY SHEET: Brand: CONVERTORS

## (undated) DEVICE — DRAPE LAPAROTOMY W/POUCHES

## (undated) DEVICE — GLOVE INDICATOR PI UNDERGLOVE SZ 6.5 BLUE

## (undated) DEVICE — INTENDED FOR TISSUE SEPARATION, AND OTHER PROCEDURES THAT REQUIRE A SHARP SURGICAL BLADE TO PUNCTURE OR CUT.: Brand: BARD-PARKER SAFETY BLADES SIZE 11, STERILE

## (undated) DEVICE — PACK GENERAL LF

## (undated) DEVICE — SUT PROLENE 0 CT-1 30 IN 8424H

## (undated) DEVICE — SUT ETHILON 3-0 FSL 30 IN 1671H

## (undated) DEVICE — TUBING ARTHROSCOPIC WAVE  MAIN PUMP

## (undated) DEVICE — GLOVE SRG BIOGEL 8

## (undated) DEVICE — PROXIMATE SKIN STAPLERS (35 WIDE) CONTAINS 35 STAINLESS STEEL STAPLES (FIXED HEAD): Brand: PROXIMATE

## (undated) DEVICE — SUT PROLENE 1 CT 30 IN 8435H

## (undated) DEVICE — DRAIN SPONGES,6 PLY: Brand: EXCILON

## (undated) DEVICE — GLOVE SRG BIOGEL 6.5

## (undated) DEVICE — FABRIC REINFORCED, SURGICAL GOWN, XL: Brand: CONVERTORS

## (undated) DEVICE — BASIC DOUBLE BASIN 2-LF: Brand: MEDLINE INDUSTRIES, INC.

## (undated) DEVICE — BLADE SHAVER TORPEDO CRV 4MM 13MM COOLCUT

## (undated) DEVICE — PACK ARTHROSCOPY

## (undated) DEVICE — DRAPE UTILITY

## (undated) DEVICE — JP PERF DRN SIL FLT 10MM FULL: Brand: CARDINAL HEALTH

## (undated) DEVICE — TIBURON SPLIT SHEET: Brand: CONVERTORS

## (undated) DEVICE — OCCLUSIVE GAUZE STRIP,3% BISMUTH TRIBROMOPHENATE IN PETROLATUM BLEND: Brand: XEROFORM

## (undated) DEVICE — ADHESIVE SKIN HIGH VISCOSITY EXOFIN 1ML

## (undated) DEVICE — CHLORAPREP HI-LITE 26ML ORANGE

## (undated) DEVICE — JACKSON-PRATT 100CC BULB RESERVOIR: Brand: CARDINAL HEALTH

## (undated) DEVICE — VAPR PREMIERE50 ELECTRODE WITH HAND CONTROLS 3.0MM, 50 DEGREES SUCTION WITH INTEGRATED HANDPIECE: Brand: VAPR

## (undated) DEVICE — BLADE SHAVER DISSECTOR 4MM 13CM COOLCUT